# Patient Record
Sex: MALE | Race: WHITE | NOT HISPANIC OR LATINO | Employment: OTHER | URBAN - METROPOLITAN AREA
[De-identification: names, ages, dates, MRNs, and addresses within clinical notes are randomized per-mention and may not be internally consistent; named-entity substitution may affect disease eponyms.]

---

## 2018-01-01 ENCOUNTER — HOSPITAL ENCOUNTER (OUTPATIENT)
Dept: CARDIOLOGY | Facility: HOSPITAL | Age: 78
Discharge: HOME | End: 2018-10-24
Attending: INTERNAL MEDICINE

## 2018-01-01 ENCOUNTER — HOSPITAL ENCOUNTER (OUTPATIENT)
Dept: CT IMAGING | Facility: HOSPITAL | Age: 78
Discharge: HOME | End: 2018-08-07
Attending: OTOLARYNGOLOGY | Admitting: OTOLARYNGOLOGY

## 2018-01-01 ENCOUNTER — HOSPITAL ENCOUNTER (OUTPATIENT)
Dept: GENERAL RADIOLOGY | Facility: HOSPITAL | Age: 78
Discharge: HOME | End: 2018-12-19
Attending: OTOLARYNGOLOGY | Admitting: OTOLARYNGOLOGY

## 2018-01-01 ENCOUNTER — TRANSCRIBE ORDERS (OUTPATIENT)
Dept: ADMINISTRATIVE | Facility: HOSPITAL | Age: 78
End: 2018-01-01

## 2018-01-01 ENCOUNTER — HOSPITAL ENCOUNTER (OUTPATIENT)
Dept: CT IMAGING | Facility: HOSPITAL | Age: 78
Discharge: HOME | End: 2018-10-24
Attending: INTERNAL MEDICINE | Admitting: INTERNAL MEDICINE

## 2018-01-01 VITALS
SYSTOLIC BLOOD PRESSURE: 139 MMHG | DIASTOLIC BLOOD PRESSURE: 76 MMHG | HEART RATE: 76 BPM | HEIGHT: 71 IN | WEIGHT: 245 LBS | BODY MASS INDEX: 34.3 KG/M2

## 2018-01-01 DIAGNOSIS — J38.00 VOCAL CORD PARESIS: ICD-10-CM

## 2018-01-01 DIAGNOSIS — J38.00 VOCAL FOLD PARESIS: Primary | ICD-10-CM

## 2018-01-01 DIAGNOSIS — R06.09 DOE (DYSPNEA ON EXERTION): ICD-10-CM

## 2018-01-01 DIAGNOSIS — J38.00 VOCAL FOLD PARESIS: ICD-10-CM

## 2018-01-01 DIAGNOSIS — R60.0 PEDAL EDEMA: ICD-10-CM

## 2018-01-01 DIAGNOSIS — R05.9 COUGH: Primary | ICD-10-CM

## 2018-01-01 DIAGNOSIS — R91.8 INFILTRATE OF LUNG PRESENT ON CHEST X-RAY: ICD-10-CM

## 2018-01-01 DIAGNOSIS — R13.10 DYSPHAGIA, UNSPECIFIED TYPE: ICD-10-CM

## 2018-01-01 DIAGNOSIS — R91.8 INFILTRATE OF LUNG PRESENT ON CHEST X-RAY: Primary | ICD-10-CM

## 2018-01-01 DIAGNOSIS — R13.10 DYSPHAGIA, UNSPECIFIED TYPE: Primary | ICD-10-CM

## 2018-01-01 LAB
BH CV ECHO MEAS - ACS: 1.9 CM
BH CV ECHO MEAS - AO MAX PG (FULL): 3.1 MMHG
BH CV ECHO MEAS - AO MAX PG: 7.7 MMHG
BH CV ECHO MEAS - AO MEAN PG (FULL): 1 MMHG
BH CV ECHO MEAS - AO MEAN PG: 4 MMHG
BH CV ECHO MEAS - AO ROOT AREA (BSA CORRECTED): 1.6
BH CV ECHO MEAS - AO ROOT AREA: 10.2 CM^2
BH CV ECHO MEAS - AO ROOT DIAM: 3.6 CM
BH CV ECHO MEAS - AO V2 MAX: 139 CM/SEC
BH CV ECHO MEAS - AO V2 MEAN: 92.5 CM/SEC
BH CV ECHO MEAS - AO V2 VTI: 26.9 CM
BH CV ECHO MEAS - AVA(I,A): 3.4 CM^2
BH CV ECHO MEAS - AVA(I,D): 3.4 CM^2
BH CV ECHO MEAS - AVA(V,A): 3.2 CM^2
BH CV ECHO MEAS - AVA(V,D): 3.2 CM^2
BH CV ECHO MEAS - BSA(HAYCOCK): 2.4 M^2
BH CV ECHO MEAS - BSA: 2.3 M^2
BH CV ECHO MEAS - BZI_BMI: 34.2 KILOGRAMS/M^2
BH CV ECHO MEAS - BZI_METRIC_HEIGHT: 180.3 CM
BH CV ECHO MEAS - BZI_METRIC_WEIGHT: 111.1 KG
BH CV ECHO MEAS - EDV(CUBED): 97.3 ML
BH CV ECHO MEAS - EDV(MOD-SP2): 108 ML
BH CV ECHO MEAS - EDV(MOD-SP4): 129 ML
BH CV ECHO MEAS - EDV(TEICH): 97.3 ML
BH CV ECHO MEAS - EF(CUBED): 77.4 %
BH CV ECHO MEAS - EF(MOD-BP): 71 %
BH CV ECHO MEAS - EF(MOD-SP2): 69.4 %
BH CV ECHO MEAS - EF(MOD-SP4): 70.5 %
BH CV ECHO MEAS - EF(TEICH): 69.6 %
BH CV ECHO MEAS - ESV(CUBED): 22 ML
BH CV ECHO MEAS - ESV(MOD-SP2): 33 ML
BH CV ECHO MEAS - ESV(MOD-SP4): 38 ML
BH CV ECHO MEAS - ESV(TEICH): 29.6 ML
BH CV ECHO MEAS - FS: 39.1 %
BH CV ECHO MEAS - IVS/LVPW: 0.92
BH CV ECHO MEAS - IVSD: 1.1 CM
BH CV ECHO MEAS - LAT PEAK E' VEL: 9.6 CM/SEC
BH CV ECHO MEAS - LV DIASTOLIC VOL/BSA (35-75): 56.1 ML/M^2
BH CV ECHO MEAS - LV MASS(C)D: 192.9 GRAMS
BH CV ECHO MEAS - LV MASS(C)DI: 83.9 GRAMS/M^2
BH CV ECHO MEAS - LV MAX PG: 4.6 MMHG
BH CV ECHO MEAS - LV MEAN PG: 3 MMHG
BH CV ECHO MEAS - LV SYSTOLIC VOL/BSA (12-30): 16.5 ML/M^2
BH CV ECHO MEAS - LV V1 MAX: 107 CM/SEC
BH CV ECHO MEAS - LV V1 MEAN: 77.6 CM/SEC
BH CV ECHO MEAS - LV V1 VTI: 22.3 CM
BH CV ECHO MEAS - LVIDD: 4.6 CM
BH CV ECHO MEAS - LVIDS: 2.8 CM
BH CV ECHO MEAS - LVLD AP2: 7.4 CM
BH CV ECHO MEAS - LVLD AP4: 8 CM
BH CV ECHO MEAS - LVLS AP2: 6.1 CM
BH CV ECHO MEAS - LVLS AP4: 6.5 CM
BH CV ECHO MEAS - LVOT AREA (M): 4.2 CM^2
BH CV ECHO MEAS - LVOT AREA: 4.2 CM^2
BH CV ECHO MEAS - LVOT DIAM: 2.3 CM
BH CV ECHO MEAS - LVPWD: 1.2 CM
BH CV ECHO MEAS - MED PEAK E' VEL: 8.1 CM/SEC
BH CV ECHO MEAS - MV A DUR: 0.17 SEC
BH CV ECHO MEAS - MV A MAX VEL: 120 CM/SEC
BH CV ECHO MEAS - MV DEC SLOPE: 403.5 CM/SEC^2
BH CV ECHO MEAS - MV DEC TIME: 0.15 SEC
BH CV ECHO MEAS - MV E MAX VEL: 61.1 CM/SEC
BH CV ECHO MEAS - MV E/A: 0.51
BH CV ECHO MEAS - MV MAX PG: 6 MMHG
BH CV ECHO MEAS - MV MEAN PG: 3 MMHG
BH CV ECHO MEAS - MV P1/2T MAX VEL: 78.2 CM/SEC
BH CV ECHO MEAS - MV P1/2T: 56.8 MSEC
BH CV ECHO MEAS - MV V2 MAX: 122 CM/SEC
BH CV ECHO MEAS - MV V2 MEAN: 73.6 CM/SEC
BH CV ECHO MEAS - MV V2 VTI: 24.7 CM
BH CV ECHO MEAS - MVA P1/2T LCG: 2.8 CM^2
BH CV ECHO MEAS - MVA(P1/2T): 3.9 CM^2
BH CV ECHO MEAS - MVA(VTI): 3.8 CM^2
BH CV ECHO MEAS - PA ACC TIME: 0.1 SEC
BH CV ECHO MEAS - PA MAX PG (FULL): 1.1 MMHG
BH CV ECHO MEAS - PA MAX PG: 3.4 MMHG
BH CV ECHO MEAS - PA PR(ACCEL): 34.5 MMHG
BH CV ECHO MEAS - PA V2 MAX: 92.3 CM/SEC
BH CV ECHO MEAS - PULM A REVS DUR: 0.16 SEC
BH CV ECHO MEAS - PULM A REVS VEL: 33 CM/SEC
BH CV ECHO MEAS - PULM DIAS VEL: 28.3 CM/SEC
BH CV ECHO MEAS - PULM S/D: 1.1
BH CV ECHO MEAS - PULM SYS VEL: 31.3 CM/SEC
BH CV ECHO MEAS - PVA(V,A): 2.8 CM^2
BH CV ECHO MEAS - PVA(V,D): 2.8 CM^2
BH CV ECHO MEAS - QP/QS: 0.58
BH CV ECHO MEAS - RAP SYSTOLE: 3 MMHG
BH CV ECHO MEAS - RV MAX PG: 2.3 MMHG
BH CV ECHO MEAS - RV MEAN PG: 1 MMHG
BH CV ECHO MEAS - RV V1 MAX: 75.4 CM/SEC
BH CV ECHO MEAS - RV V1 MEAN: 52.2 CM/SEC
BH CV ECHO MEAS - RV V1 VTI: 15.6 CM
BH CV ECHO MEAS - RVOT AREA: 3.5 CM^2
BH CV ECHO MEAS - RVOT DIAM: 2.1 CM
BH CV ECHO MEAS - RVSP: 21 MMHG
BH CV ECHO MEAS - SI(AO): 119.1 ML/M^2
BH CV ECHO MEAS - SI(CUBED): 32.8 ML/M^2
BH CV ECHO MEAS - SI(LVOT): 40.3 ML/M^2
BH CV ECHO MEAS - SI(MOD-SP2): 32.6 ML/M^2
BH CV ECHO MEAS - SI(MOD-SP4): 39.6 ML/M^2
BH CV ECHO MEAS - SI(TEICH): 29.5 ML/M^2
BH CV ECHO MEAS - SV(AO): 273.8 ML
BH CV ECHO MEAS - SV(CUBED): 75.4 ML
BH CV ECHO MEAS - SV(LVOT): 92.7 ML
BH CV ECHO MEAS - SV(MOD-SP2): 75 ML
BH CV ECHO MEAS - SV(MOD-SP4): 91 ML
BH CV ECHO MEAS - SV(RVOT): 54 ML
BH CV ECHO MEAS - SV(TEICH): 67.8 ML
BH CV ECHO MEAS - TAPSE (>1.6): 2.8 CM2
BH CV ECHO MEAS - TR MAX VEL: 212 CM/SEC
BH CV ECHO MEASUREMENTS AVERAGE E/E' RATIO: 6.9
BH CV XLRA - RV BASE: 3.3 CM
BH CV XLRA - RV LENGTH: 7.5 CM
BH CV XLRA - RV MID: 3 CM
BH CV XLRA - TDI S': 11 CM/SEC
CREAT BLDA-MCNC: 1.1 MG/DL (ref 0.6–1.3)
LEFT ATRIUM VOLUME INDEX: 30 ML/M2
LV EF 2D ECHO EST: 71 %
MAXIMAL PREDICTED HEART RATE: 142 BPM
STRESS TARGET HR: 121 BPM

## 2018-01-01 PROCEDURE — 71250 CT THORAX DX C-: CPT

## 2018-01-01 PROCEDURE — 70470 CT HEAD/BRAIN W/O & W/DYE: CPT

## 2018-01-01 PROCEDURE — 74220 X-RAY XM ESOPHAGUS 1CNTRST: CPT

## 2018-01-01 PROCEDURE — 25010000002 IOPAMIDOL 61 % SOLUTION: Performed by: OTOLARYNGOLOGY

## 2018-01-01 PROCEDURE — 93306 TTE W/DOPPLER COMPLETE: CPT

## 2018-01-01 PROCEDURE — 93306 TTE W/DOPPLER COMPLETE: CPT | Performed by: INTERNAL MEDICINE

## 2018-01-01 PROCEDURE — 70491 CT SOFT TISSUE NECK W/DYE: CPT

## 2018-01-01 PROCEDURE — 82565 ASSAY OF CREATININE: CPT

## 2018-01-01 RX ADMIN — IOPAMIDOL 75 ML: 612 INJECTION, SOLUTION INTRAVENOUS at 09:23

## 2018-01-01 RX ADMIN — BARIUM SULFATE 183 ML: 960 POWDER, FOR SUSPENSION ORAL at 09:21

## 2019-01-01 ENCOUNTER — OFFICE VISIT (OUTPATIENT)
Dept: SURGERY | Facility: CLINIC | Age: 79
End: 2019-01-01

## 2019-01-01 ENCOUNTER — APPOINTMENT (OUTPATIENT)
Dept: CT IMAGING | Facility: HOSPITAL | Age: 79
End: 2019-01-01

## 2019-01-01 ENCOUNTER — APPOINTMENT (OUTPATIENT)
Dept: GENERAL RADIOLOGY | Facility: HOSPITAL | Age: 79
End: 2019-01-01

## 2019-01-01 ENCOUNTER — OFFICE VISIT (OUTPATIENT)
Dept: ONCOLOGY | Facility: CLINIC | Age: 79
End: 2019-01-01

## 2019-01-01 ENCOUNTER — READMISSION MANAGEMENT (OUTPATIENT)
Dept: CALL CENTER | Facility: HOSPITAL | Age: 79
End: 2019-01-01

## 2019-01-01 ENCOUNTER — APPOINTMENT (OUTPATIENT)
Dept: LAB | Facility: HOSPITAL | Age: 79
End: 2019-01-01

## 2019-01-01 ENCOUNTER — ANESTHESIA EVENT (OUTPATIENT)
Dept: PERIOP | Facility: HOSPITAL | Age: 79
End: 2019-01-01

## 2019-01-01 ENCOUNTER — HOSPITAL ENCOUNTER (INPATIENT)
Facility: HOSPITAL | Age: 79
LOS: 3 days | Discharge: HOME | End: 2019-03-29
Attending: SURGERY | Admitting: SURGERY

## 2019-01-01 ENCOUNTER — HOSPITAL ENCOUNTER (EMERGENCY)
Facility: HOSPITAL | Age: 79
Discharge: HOME | End: 2019-04-03
Attending: EMERGENCY MEDICINE | Admitting: EMERGENCY MEDICINE

## 2019-01-01 ENCOUNTER — HOSPITAL ENCOUNTER (OUTPATIENT)
Dept: CT IMAGING | Facility: HOSPITAL | Age: 79
Discharge: HOME | End: 2019-01-26
Attending: OTOLARYNGOLOGY | Admitting: OTOLARYNGOLOGY

## 2019-01-01 ENCOUNTER — APPOINTMENT (OUTPATIENT)
Dept: ONCOLOGY | Facility: CLINIC | Age: 79
End: 2019-01-01

## 2019-01-01 ENCOUNTER — ANESTHESIA EVENT (OUTPATIENT)
Dept: GASTROENTEROLOGY | Facility: HOSPITAL | Age: 79
End: 2019-01-01

## 2019-01-01 ENCOUNTER — HOSPITAL ENCOUNTER (INPATIENT)
Facility: HOSPITAL | Age: 79
LOS: 4 days | Discharge: NURSING, REHAB OR LONG-TERM CARE | End: 2019-02-16
Attending: EMERGENCY MEDICINE | Admitting: INTERNAL MEDICINE

## 2019-01-01 ENCOUNTER — HOSPITAL ENCOUNTER (INPATIENT)
Facility: HOSPITAL | Age: 79
LOS: 1 days | End: 2019-06-14
Attending: EMERGENCY MEDICINE | Admitting: INTERNAL MEDICINE

## 2019-01-01 ENCOUNTER — HOSPITAL ENCOUNTER (INPATIENT)
Facility: HOSPITAL | Age: 79
LOS: 10 days | Discharge: HOME HEALTH | End: 2019-02-09
Attending: OTOLARYNGOLOGY | Admitting: OTOLARYNGOLOGY

## 2019-01-01 ENCOUNTER — ANESTHESIA (OUTPATIENT)
Dept: PERIOP | Facility: HOSPITAL | Age: 79
End: 2019-01-01

## 2019-01-01 ENCOUNTER — APPOINTMENT (OUTPATIENT)
Dept: PREADMISSION TESTING | Facility: HOSPITAL | Age: 79
End: 2019-01-01

## 2019-01-01 ENCOUNTER — APPOINTMENT (OUTPATIENT)
Dept: CARDIOLOGY | Facility: HOSPITAL | Age: 79
End: 2019-01-01

## 2019-01-01 ENCOUNTER — HOSPITAL ENCOUNTER (OUTPATIENT)
Facility: HOSPITAL | Age: 79
Setting detail: HOSPITAL OUTPATIENT SURGERY
End: 2019-01-01
Attending: SURGERY | Admitting: SURGERY

## 2019-01-01 ENCOUNTER — TRANSCRIBE ORDERS (OUTPATIENT)
Dept: ADMINISTRATIVE | Facility: HOSPITAL | Age: 79
End: 2019-01-01

## 2019-01-01 ENCOUNTER — ANESTHESIA (OUTPATIENT)
Dept: GASTROENTEROLOGY | Facility: HOSPITAL | Age: 79
End: 2019-01-01

## 2019-01-01 ENCOUNTER — APPOINTMENT (OUTPATIENT)
Dept: GENERAL RADIOLOGY | Facility: HOSPITAL | Age: 79
End: 2019-01-01
Attending: INTERNAL MEDICINE

## 2019-01-01 VITALS
TEMPERATURE: 98.4 F | OXYGEN SATURATION: 92 % | WEIGHT: 182.9 LBS | DIASTOLIC BLOOD PRESSURE: 78 MMHG | RESPIRATION RATE: 18 BRPM | HEART RATE: 115 BPM | BODY MASS INDEX: 25.6 KG/M2 | HEIGHT: 71 IN | SYSTOLIC BLOOD PRESSURE: 139 MMHG

## 2019-01-01 VITALS
DIASTOLIC BLOOD PRESSURE: 57 MMHG | SYSTOLIC BLOOD PRESSURE: 126 MMHG | RESPIRATION RATE: 20 BRPM | TEMPERATURE: 97.8 F | HEIGHT: 71 IN | BODY MASS INDEX: 28.15 KG/M2 | HEART RATE: 78 BPM | OXYGEN SATURATION: 91 % | WEIGHT: 201.1 LBS

## 2019-01-01 VITALS
RESPIRATION RATE: 20 BRPM | BODY MASS INDEX: 26.85 KG/M2 | WEIGHT: 191.8 LBS | OXYGEN SATURATION: 90 % | SYSTOLIC BLOOD PRESSURE: 144 MMHG | HEIGHT: 71 IN | DIASTOLIC BLOOD PRESSURE: 79 MMHG | HEART RATE: 96 BPM | TEMPERATURE: 98.1 F

## 2019-01-01 VITALS
TEMPERATURE: 98.2 F | BODY MASS INDEX: 24.78 KG/M2 | DIASTOLIC BLOOD PRESSURE: 54 MMHG | HEIGHT: 71 IN | SYSTOLIC BLOOD PRESSURE: 101 MMHG | WEIGHT: 177 LBS | OXYGEN SATURATION: 93 %

## 2019-01-01 VITALS
OXYGEN SATURATION: 90 % | RESPIRATION RATE: 16 BRPM | BODY MASS INDEX: 24.81 KG/M2 | WEIGHT: 177.2 LBS | HEART RATE: 110 BPM | SYSTOLIC BLOOD PRESSURE: 137 MMHG | HEIGHT: 71 IN | TEMPERATURE: 99 F | DIASTOLIC BLOOD PRESSURE: 77 MMHG

## 2019-01-01 VITALS
OXYGEN SATURATION: 91 % | BODY MASS INDEX: 27.24 KG/M2 | WEIGHT: 194.6 LBS | SYSTOLIC BLOOD PRESSURE: 124 MMHG | DIASTOLIC BLOOD PRESSURE: 69 MMHG | RESPIRATION RATE: 18 BRPM | TEMPERATURE: 98.1 F | HEIGHT: 71 IN | HEART RATE: 88 BPM

## 2019-01-01 VITALS
DIASTOLIC BLOOD PRESSURE: 91 MMHG | RESPIRATION RATE: 18 BRPM | WEIGHT: 174.05 LBS | SYSTOLIC BLOOD PRESSURE: 156 MMHG | TEMPERATURE: 98.9 F | BODY MASS INDEX: 24.37 KG/M2 | HEIGHT: 71 IN | OXYGEN SATURATION: 98 % | HEART RATE: 92 BPM

## 2019-01-01 VITALS — HEART RATE: 85 BPM | OXYGEN SATURATION: 98 %

## 2019-01-01 VITALS — HEIGHT: 71 IN | BODY MASS INDEX: 26.75 KG/M2

## 2019-01-01 DIAGNOSIS — R06.00 DYSPNEA, UNSPECIFIED TYPE: ICD-10-CM

## 2019-01-01 DIAGNOSIS — J96.02 ACUTE RESPIRATORY FAILURE WITH HYPERCAPNIA (HCC): Primary | ICD-10-CM

## 2019-01-01 DIAGNOSIS — C32.9 LARYNGEAL CANCER (HCC): Primary | ICD-10-CM

## 2019-01-01 DIAGNOSIS — K94.29 IRRITATION AROUND PERCUTANEOUS ENDOSCOPIC GASTROSTOMY (PEG) TUBE SITE (HCC): Primary | ICD-10-CM

## 2019-01-01 DIAGNOSIS — C32.9 LARYNGEAL CANCER (HCC): ICD-10-CM

## 2019-01-01 DIAGNOSIS — R53.1 GENERALIZED WEAKNESS: ICD-10-CM

## 2019-01-01 DIAGNOSIS — Z85.9 HISTORY OF TREATMENT FOR MALIGNANCY: ICD-10-CM

## 2019-01-01 DIAGNOSIS — Z93.1 COMPLAINT ASSOCIATED WITH GASTRIC TUBE (HCC): ICD-10-CM

## 2019-01-01 DIAGNOSIS — R68.89 COMPLAINT ASSOCIATED WITH GASTRIC TUBE (HCC): ICD-10-CM

## 2019-01-01 DIAGNOSIS — R13.12 OROPHARYNGEAL DYSPHAGIA: ICD-10-CM

## 2019-01-01 DIAGNOSIS — R53.1 WEAKNESS: ICD-10-CM

## 2019-01-01 DIAGNOSIS — R13.12 OROPHARYNGEAL DYSPHAGIA: Primary | ICD-10-CM

## 2019-01-01 DIAGNOSIS — I26.99 PULMONARY EMBOLISM ON RIGHT (HCC): Primary | ICD-10-CM

## 2019-01-01 DIAGNOSIS — C32.0 VOCAL CORD CANCER (HCC): ICD-10-CM

## 2019-01-01 DIAGNOSIS — T14.8XXA BLEEDING FROM WOUND: Primary | ICD-10-CM

## 2019-01-01 DIAGNOSIS — K94.23 DRAINAGE FROM GASTROSTOMY TUBE SITE (HCC): Primary | ICD-10-CM

## 2019-01-01 DIAGNOSIS — J38.00 VOCAL CORD PARALYSIS: ICD-10-CM

## 2019-01-01 DIAGNOSIS — L89.159 DECUBITUS ULCER OF COCCYX, UNSPECIFIED PRESSURE ULCER STAGE: ICD-10-CM

## 2019-01-01 DIAGNOSIS — J18.9 PNEUMONIA OF BOTH LOWER LOBES DUE TO INFECTIOUS ORGANISM: Primary | ICD-10-CM

## 2019-01-01 DIAGNOSIS — Z51.89 VISIT FOR WOUND CHECK: Primary | ICD-10-CM

## 2019-01-01 DIAGNOSIS — R09.02 HYPOXIA: ICD-10-CM

## 2019-01-01 DIAGNOSIS — C32.0 VOCAL CORD CANCER (HCC): Primary | ICD-10-CM

## 2019-01-01 DIAGNOSIS — J96.02 ACUTE RESPIRATORY FAILURE WITH HYPERCAPNIA (HCC): ICD-10-CM

## 2019-01-01 LAB
ALBUMIN SERPL-MCNC: 2.4 G/DL (ref 3.5–5.2)
ALBUMIN SERPL-MCNC: 2.9 G/DL (ref 3.5–5.2)
ALBUMIN SERPL-MCNC: 2.9 G/DL (ref 3.5–5.2)
ALBUMIN SERPL-MCNC: 3 G/DL (ref 3.5–5.2)
ALBUMIN SERPL-MCNC: 3.3 G/DL (ref 3.5–5.2)
ALBUMIN/GLOB SERPL: 0.6 G/DL
ALBUMIN/GLOB SERPL: 0.9 G/DL
ALBUMIN/GLOB SERPL: 1 G/DL
ALBUMIN/GLOB SERPL: 1 G/DL
ALBUMIN/GLOB SERPL: 1.1 G/DL
ALP SERPL-CCNC: 33 U/L (ref 39–117)
ALP SERPL-CCNC: 37 U/L (ref 39–117)
ALP SERPL-CCNC: 38 U/L (ref 39–117)
ALP SERPL-CCNC: 40 U/L (ref 39–117)
ALP SERPL-CCNC: 61 U/L (ref 39–117)
ALT SERPL W P-5'-P-CCNC: 10 U/L (ref 1–41)
ALT SERPL W P-5'-P-CCNC: 25 U/L (ref 1–41)
ALT SERPL W P-5'-P-CCNC: 31 U/L (ref 1–41)
ALT SERPL W P-5'-P-CCNC: 34 U/L (ref 1–41)
ALT SERPL W P-5'-P-CCNC: 40 U/L (ref 1–41)
ANION GAP SERPL CALCULATED.3IONS-SCNC: 11 MMOL/L
ANION GAP SERPL CALCULATED.3IONS-SCNC: 11.3 MMOL/L
ANION GAP SERPL CALCULATED.3IONS-SCNC: 11.4 MMOL/L
ANION GAP SERPL CALCULATED.3IONS-SCNC: 12 MMOL/L
ANION GAP SERPL CALCULATED.3IONS-SCNC: 12.2 MMOL/L
ANION GAP SERPL CALCULATED.3IONS-SCNC: 12.3 MMOL/L
ANION GAP SERPL CALCULATED.3IONS-SCNC: 12.5 MMOL/L
ANION GAP SERPL CALCULATED.3IONS-SCNC: 12.7 MMOL/L
ANION GAP SERPL CALCULATED.3IONS-SCNC: 12.8 MMOL/L
ANION GAP SERPL CALCULATED.3IONS-SCNC: 13.7 MMOL/L
ANION GAP SERPL CALCULATED.3IONS-SCNC: 18.9 MMOL/L
ANION GAP SERPL CALCULATED.3IONS-SCNC: 5 MMOL/L
ANION GAP SERPL CALCULATED.3IONS-SCNC: 5.2 MMOL/L
ANION GAP SERPL CALCULATED.3IONS-SCNC: 5.8 MMOL/L
ANION GAP SERPL CALCULATED.3IONS-SCNC: 6.2 MMOL/L
ANION GAP SERPL CALCULATED.3IONS-SCNC: 8.7 MMOL/L
ANION GAP SERPL CALCULATED.3IONS-SCNC: 8.9 MMOL/L
ANION GAP SERPL CALCULATED.3IONS-SCNC: 9.2 MMOL/L
ANION GAP SERPL CALCULATED.3IONS-SCNC: 9.6 MMOL/L
ARTERIAL PATENCY WRIST A: POSITIVE
AST SERPL-CCNC: 12 U/L (ref 1–40)
AST SERPL-CCNC: 13 U/L (ref 1–40)
AST SERPL-CCNC: 18 U/L (ref 1–40)
AST SERPL-CCNC: 27 U/L (ref 1–40)
AST SERPL-CCNC: 28 U/L (ref 1–40)
ATMOSPHERIC PRESS: 753.7 MMHG
ATMOSPHERIC PRESS: 754.1 MMHG
ATMOSPHERIC PRESS: 758.3 MMHG
BASE EXCESS BLDA CALC-SCNC: -1.6 MMOL/L (ref 0–2)
BASE EXCESS BLDA CALC-SCNC: 3.1 MMOL/L (ref 0–2)
BASE EXCESS BLDA CALC-SCNC: 9.6 MMOL/L (ref 0–2)
BASOPHILS # BLD AUTO: 0 10*3/MM3 (ref 0–0.2)
BASOPHILS # BLD AUTO: 0 10*3/MM3 (ref 0–0.2)
BASOPHILS # BLD AUTO: 0.01 10*3/MM3 (ref 0–0.2)
BASOPHILS # BLD AUTO: 0.01 10*3/MM3 (ref 0–0.2)
BASOPHILS # BLD AUTO: 0.02 10*3/MM3 (ref 0–0.2)
BASOPHILS # BLD AUTO: 0.04 10*3/MM3 (ref 0–0.2)
BASOPHILS # BLD AUTO: 0.04 10*3/MM3 (ref 0–0.2)
BASOPHILS # BLD AUTO: 0.05 10*3/MM3 (ref 0–0.2)
BASOPHILS NFR BLD AUTO: 0 % (ref 0–1.5)
BASOPHILS NFR BLD AUTO: 0 % (ref 0–1.5)
BASOPHILS NFR BLD AUTO: 0.1 % (ref 0–1.5)
BASOPHILS NFR BLD AUTO: 0.1 % (ref 0–1.5)
BASOPHILS NFR BLD AUTO: 0.2 % (ref 0–1.5)
BASOPHILS NFR BLD AUTO: 0.3 % (ref 0–1.5)
BASOPHILS NFR BLD AUTO: 0.5 % (ref 0–1.5)
BDY SITE: ABNORMAL
BH CV LOW VAS RIGHT GASTRONEMIUS VESSEL: 1
BH CV LOW VAS RIGHT GREATER SAPH BK VESSEL: 1
BH CV LOWER VASCULAR LEFT COMMON FEMORAL AUGMENT: NORMAL
BH CV LOWER VASCULAR LEFT COMMON FEMORAL COMPETENT: NORMAL
BH CV LOWER VASCULAR LEFT COMMON FEMORAL COMPRESS: NORMAL
BH CV LOWER VASCULAR LEFT COMMON FEMORAL PHASIC: NORMAL
BH CV LOWER VASCULAR LEFT COMMON FEMORAL SPONT: NORMAL
BH CV LOWER VASCULAR LEFT DISTAL FEMORAL COMPRESS: NORMAL
BH CV LOWER VASCULAR LEFT GASTRONEMIUS COMPRESS: NORMAL
BH CV LOWER VASCULAR LEFT GREATER SAPH AK COMPRESS: NORMAL
BH CV LOWER VASCULAR LEFT GREATER SAPH BK COMPRESS: NORMAL
BH CV LOWER VASCULAR LEFT MID FEMORAL AUGMENT: NORMAL
BH CV LOWER VASCULAR LEFT MID FEMORAL COMPETENT: NORMAL
BH CV LOWER VASCULAR LEFT MID FEMORAL COMPRESS: NORMAL
BH CV LOWER VASCULAR LEFT MID FEMORAL PHASIC: NORMAL
BH CV LOWER VASCULAR LEFT MID FEMORAL SPONT: NORMAL
BH CV LOWER VASCULAR LEFT PERONEAL COMPRESS: NORMAL
BH CV LOWER VASCULAR LEFT POPLITEAL AUGMENT: NORMAL
BH CV LOWER VASCULAR LEFT POPLITEAL COMPETENT: NORMAL
BH CV LOWER VASCULAR LEFT POPLITEAL COMPRESS: NORMAL
BH CV LOWER VASCULAR LEFT POPLITEAL PHASIC: NORMAL
BH CV LOWER VASCULAR LEFT POPLITEAL SPONT: NORMAL
BH CV LOWER VASCULAR LEFT POSTERIOR TIBIAL COMPRESS: NORMAL
BH CV LOWER VASCULAR LEFT PROXIMAL FEMORAL COMPRESS: NORMAL
BH CV LOWER VASCULAR LEFT SAPHENOFEMORAL JUNCTION COMPRESS: NORMAL
BH CV LOWER VASCULAR LEFT SAPHENOFEMORAL JUNCTION PHASIC: NORMAL
BH CV LOWER VASCULAR LEFT SAPHENOFEMORAL JUNCTION SPONT: NORMAL
BH CV LOWER VASCULAR RIGHT COMMON FEMORAL AUGMENT: NORMAL
BH CV LOWER VASCULAR RIGHT COMMON FEMORAL COMPETENT: NORMAL
BH CV LOWER VASCULAR RIGHT COMMON FEMORAL COMPRESS: NORMAL
BH CV LOWER VASCULAR RIGHT COMMON FEMORAL PHASIC: NORMAL
BH CV LOWER VASCULAR RIGHT COMMON FEMORAL SPONT: NORMAL
BH CV LOWER VASCULAR RIGHT DISTAL FEMORAL COMPRESS: NORMAL
BH CV LOWER VASCULAR RIGHT GASTRONEMIUS COMPRESS: NORMAL
BH CV LOWER VASCULAR RIGHT GASTRONEMIUS THROMBUS: NORMAL
BH CV LOWER VASCULAR RIGHT GREATER SAPH AK COMPRESS: NORMAL
BH CV LOWER VASCULAR RIGHT GREATER SAPH BK COMPRESS: NORMAL
BH CV LOWER VASCULAR RIGHT GREATER SAPH BK THROMBUS: NORMAL
BH CV LOWER VASCULAR RIGHT MID FEMORAL AUGMENT: NORMAL
BH CV LOWER VASCULAR RIGHT MID FEMORAL COMPETENT: NORMAL
BH CV LOWER VASCULAR RIGHT MID FEMORAL COMPRESS: NORMAL
BH CV LOWER VASCULAR RIGHT MID FEMORAL PHASIC: NORMAL
BH CV LOWER VASCULAR RIGHT MID FEMORAL SPONT: NORMAL
BH CV LOWER VASCULAR RIGHT PERONEAL COMPRESS: NORMAL
BH CV LOWER VASCULAR RIGHT POPLITEAL AUGMENT: NORMAL
BH CV LOWER VASCULAR RIGHT POPLITEAL COMPETENT: NORMAL
BH CV LOWER VASCULAR RIGHT POPLITEAL COMPRESS: NORMAL
BH CV LOWER VASCULAR RIGHT POPLITEAL PHASIC: NORMAL
BH CV LOWER VASCULAR RIGHT POPLITEAL SPONT: NORMAL
BH CV LOWER VASCULAR RIGHT POSTERIOR TIBIAL COMPRESS: NORMAL
BH CV LOWER VASCULAR RIGHT PROXIMAL FEMORAL COMPRESS: NORMAL
BH CV LOWER VASCULAR RIGHT SAPHENOFEMORAL JUNCTION COMPRESS: NORMAL
BH CV LOWER VASCULAR RIGHT SAPHENOFEMORAL JUNCTION PHASIC: NORMAL
BH CV LOWER VASCULAR RIGHT SAPHENOFEMORAL JUNCTION SPONT: NORMAL
BILIRUB SERPL-MCNC: 0.3 MG/DL (ref 0.2–1.2)
BILIRUB SERPL-MCNC: 0.4 MG/DL (ref 0.1–1.2)
BILIRUB SERPL-MCNC: 0.6 MG/DL (ref 0.1–1.2)
BILIRUB SERPL-MCNC: 0.8 MG/DL (ref 0.1–1.2)
BILIRUB SERPL-MCNC: 1.4 MG/DL (ref 0.1–1.2)
BILIRUB UR QL STRIP: NEGATIVE
BUN BLD-MCNC: 17 MG/DL (ref 8–23)
BUN BLD-MCNC: 18 MG/DL (ref 8–23)
BUN BLD-MCNC: 21 MG/DL (ref 8–23)
BUN BLD-MCNC: 23 MG/DL (ref 8–23)
BUN BLD-MCNC: 25 MG/DL (ref 8–23)
BUN BLD-MCNC: 26 MG/DL (ref 8–23)
BUN BLD-MCNC: 28 MG/DL (ref 8–23)
BUN BLD-MCNC: 30 MG/DL (ref 8–23)
BUN BLD-MCNC: 31 MG/DL (ref 8–23)
BUN BLD-MCNC: 33 MG/DL (ref 8–23)
BUN BLD-MCNC: 36 MG/DL (ref 8–23)
BUN BLD-MCNC: 41 MG/DL (ref 8–23)
BUN BLD-MCNC: 44 MG/DL (ref 8–23)
BUN BLD-MCNC: 45 MG/DL (ref 8–23)
BUN BLD-MCNC: 45 MG/DL (ref 8–23)
BUN BLD-MCNC: 46 MG/DL (ref 8–23)
BUN BLD-MCNC: 52 MG/DL (ref 8–23)
BUN/CREAT SERPL: 22.8 (ref 7–25)
BUN/CREAT SERPL: 24.8 (ref 7–25)
BUN/CREAT SERPL: 26.1 (ref 7–25)
BUN/CREAT SERPL: 26.2 (ref 7–25)
BUN/CREAT SERPL: 26.5 (ref 7–25)
BUN/CREAT SERPL: 27.2 (ref 7–25)
BUN/CREAT SERPL: 27.7 (ref 7–25)
BUN/CREAT SERPL: 30.4 (ref 7–25)
BUN/CREAT SERPL: 31.3 (ref 7–25)
BUN/CREAT SERPL: 33.3 (ref 7–25)
BUN/CREAT SERPL: 33.7 (ref 7–25)
BUN/CREAT SERPL: 35 (ref 7–25)
BUN/CREAT SERPL: 36.6 (ref 7–25)
BUN/CREAT SERPL: 38.5 (ref 7–25)
BUN/CREAT SERPL: 39 (ref 7–25)
BUN/CREAT SERPL: 39.6 (ref 7–25)
BUN/CREAT SERPL: 40.8 (ref 7–25)
BUN/CREAT SERPL: 46 (ref 7–25)
BUN/CREAT SERPL: 46.9 (ref 7–25)
CALCIUM SPEC-SCNC: 8.2 MG/DL (ref 8.6–10.5)
CALCIUM SPEC-SCNC: 8.5 MG/DL (ref 8.6–10.5)
CALCIUM SPEC-SCNC: 8.7 MG/DL (ref 8.6–10.5)
CALCIUM SPEC-SCNC: 8.8 MG/DL (ref 8.6–10.5)
CALCIUM SPEC-SCNC: 8.9 MG/DL (ref 8.6–10.5)
CALCIUM SPEC-SCNC: 8.9 MG/DL (ref 8.6–10.5)
CALCIUM SPEC-SCNC: 9 MG/DL (ref 8.6–10.5)
CALCIUM SPEC-SCNC: 9.1 MG/DL (ref 8.6–10.5)
CALCIUM SPEC-SCNC: 9.1 MG/DL (ref 8.6–10.5)
CALCIUM SPEC-SCNC: 9.2 MG/DL (ref 8.6–10.5)
CALCIUM SPEC-SCNC: 9.3 MG/DL (ref 8.6–10.5)
CALCIUM SPEC-SCNC: 9.9 MG/DL (ref 8.6–10.5)
CHLORIDE SERPL-SCNC: 100 MMOL/L (ref 98–107)
CHLORIDE SERPL-SCNC: 101 MMOL/L (ref 98–107)
CHLORIDE SERPL-SCNC: 102 MMOL/L (ref 98–107)
CHLORIDE SERPL-SCNC: 103 MMOL/L (ref 98–107)
CHLORIDE SERPL-SCNC: 105 MMOL/L (ref 98–107)
CHLORIDE SERPL-SCNC: 82 MMOL/L (ref 98–107)
CHLORIDE SERPL-SCNC: 95 MMOL/L (ref 98–107)
CHLORIDE SERPL-SCNC: 95 MMOL/L (ref 98–107)
CHLORIDE SERPL-SCNC: 96 MMOL/L (ref 98–107)
CHLORIDE SERPL-SCNC: 97 MMOL/L (ref 98–107)
CHLORIDE SERPL-SCNC: 97 MMOL/L (ref 98–107)
CHLORIDE SERPL-SCNC: 98 MMOL/L (ref 98–107)
CLARITY UR: CLEAR
CO2 SERPL-SCNC: 26.2 MMOL/L (ref 22–29)
CO2 SERPL-SCNC: 28.3 MMOL/L (ref 22–29)
CO2 SERPL-SCNC: 28.3 MMOL/L (ref 22–29)
CO2 SERPL-SCNC: 28.7 MMOL/L (ref 22–29)
CO2 SERPL-SCNC: 28.8 MMOL/L (ref 22–29)
CO2 SERPL-SCNC: 29.3 MMOL/L (ref 22–29)
CO2 SERPL-SCNC: 29.6 MMOL/L (ref 22–29)
CO2 SERPL-SCNC: 29.7 MMOL/L (ref 22–29)
CO2 SERPL-SCNC: 30.5 MMOL/L (ref 22–29)
CO2 SERPL-SCNC: 32 MMOL/L (ref 22–29)
CO2 SERPL-SCNC: 32.1 MMOL/L (ref 22–29)
CO2 SERPL-SCNC: 32.8 MMOL/L (ref 22–29)
CO2 SERPL-SCNC: 32.8 MMOL/L (ref 22–29)
CO2 SERPL-SCNC: 33 MMOL/L (ref 22–29)
CO2 SERPL-SCNC: 33.4 MMOL/L (ref 22–29)
CO2 SERPL-SCNC: 36 MMOL/L (ref 22–29)
CO2 SERPL-SCNC: 36.1 MMOL/L (ref 22–29)
CO2 SERPL-SCNC: 37.8 MMOL/L (ref 22–29)
CO2 SERPL-SCNC: 38.2 MMOL/L (ref 22–29)
COLOR UR: YELLOW
CREAT BLD-MCNC: 0.64 MG/DL (ref 0.76–1.27)
CREAT BLD-MCNC: 0.65 MG/DL (ref 0.76–1.27)
CREAT BLD-MCNC: 0.65 MG/DL (ref 0.76–1.27)
CREAT BLD-MCNC: 0.67 MG/DL (ref 0.76–1.27)
CREAT BLD-MCNC: 0.68 MG/DL (ref 0.76–1.27)
CREAT BLD-MCNC: 0.75 MG/DL (ref 0.76–1.27)
CREAT BLD-MCNC: 0.76 MG/DL (ref 0.76–1.27)
CREAT BLD-MCNC: 0.79 MG/DL (ref 0.76–1.27)
CREAT BLD-MCNC: 0.88 MG/DL (ref 0.76–1.27)
CREAT BLD-MCNC: 0.98 MG/DL (ref 0.76–1.27)
CREAT BLD-MCNC: 1.03 MG/DL (ref 0.76–1.27)
CREAT BLD-MCNC: 1.03 MG/DL (ref 0.76–1.27)
CREAT BLD-MCNC: 1.05 MG/DL (ref 0.76–1.27)
CREAT BLD-MCNC: 1.11 MG/DL (ref 0.76–1.27)
CREAT BLD-MCNC: 1.13 MG/DL (ref 0.76–1.27)
CREAT BLD-MCNC: 1.17 MG/DL (ref 0.76–1.27)
CREAT BLD-MCNC: 1.18 MG/DL (ref 0.76–1.27)
CREAT BLD-MCNC: 1.23 MG/DL (ref 0.76–1.27)
CREAT BLD-MCNC: 1.35 MG/DL (ref 0.76–1.27)
CYTO UR: NORMAL
DEPRECATED RDW RBC AUTO: 42.7 FL (ref 37–54)
DEPRECATED RDW RBC AUTO: 47.8 FL (ref 37–54)
DEPRECATED RDW RBC AUTO: 49.2 FL (ref 37–54)
DEPRECATED RDW RBC AUTO: 49.4 FL (ref 37–54)
DEPRECATED RDW RBC AUTO: 49.6 FL (ref 37–54)
DEPRECATED RDW RBC AUTO: 50.4 FL (ref 37–54)
DEPRECATED RDW RBC AUTO: 50.7 FL (ref 37–54)
DEPRECATED RDW RBC AUTO: 50.7 FL (ref 37–54)
DEPRECATED RDW RBC AUTO: 51.1 FL (ref 37–54)
DEPRECATED RDW RBC AUTO: 51.2 FL (ref 37–54)
DEPRECATED RDW RBC AUTO: 51.8 FL (ref 37–54)
DEPRECATED RDW RBC AUTO: 51.8 FL (ref 37–54)
DEPRECATED RDW RBC AUTO: 52.4 FL (ref 37–54)
DEPRECATED RDW RBC AUTO: 53.1 FL (ref 37–54)
DEPRECATED RDW RBC AUTO: 53.2 FL (ref 37–54)
DEPRECATED RDW RBC AUTO: 53.4 FL (ref 37–54)
DEPRECATED RDW RBC AUTO: 53.5 FL (ref 37–54)
EOSINOPHIL # BLD AUTO: 0 10*3/MM3 (ref 0–0.4)
EOSINOPHIL # BLD AUTO: 0 10*3/MM3 (ref 0–0.7)
EOSINOPHIL # BLD AUTO: 0.01 10*3/MM3 (ref 0–0.7)
EOSINOPHIL # BLD AUTO: 0.01 10*3/MM3 (ref 0–0.7)
EOSINOPHIL # BLD AUTO: 0.02 10*3/MM3 (ref 0–0.4)
EOSINOPHIL # BLD AUTO: 0.03 10*3/MM3 (ref 0–0.4)
EOSINOPHIL # BLD AUTO: 0.06 10*3/MM3 (ref 0–0.4)
EOSINOPHIL # BLD AUTO: 0.07 10*3/MM3 (ref 0–0.4)
EOSINOPHIL # BLD AUTO: 0.08 10*3/MM3 (ref 0–0.4)
EOSINOPHIL # BLD AUTO: 0.11 10*3/MM3 (ref 0–0.4)
EOSINOPHIL NFR BLD AUTO: 0 % (ref 0.3–6.2)
EOSINOPHIL NFR BLD AUTO: 0 % (ref 0.3–6.2)
EOSINOPHIL NFR BLD AUTO: 0.1 % (ref 0.3–6.2)
EOSINOPHIL NFR BLD AUTO: 0.1 % (ref 0.3–6.2)
EOSINOPHIL NFR BLD AUTO: 0.2 % (ref 0.3–6.2)
EOSINOPHIL NFR BLD AUTO: 0.3 % (ref 0.3–6.2)
EOSINOPHIL NFR BLD AUTO: 0.7 % (ref 0.3–6.2)
EOSINOPHIL NFR BLD AUTO: 0.7 % (ref 0.3–6.2)
EOSINOPHIL NFR BLD AUTO: 0.9 % (ref 0.3–6.2)
EOSINOPHIL NFR BLD AUTO: 1.2 % (ref 0.3–6.2)
ERYTHROCYTE [DISTWIDTH] IN BLOOD BY AUTOMATED COUNT: 13.1 % (ref 12.3–15.4)
ERYTHROCYTE [DISTWIDTH] IN BLOOD BY AUTOMATED COUNT: 13.7 % (ref 12.3–15.4)
ERYTHROCYTE [DISTWIDTH] IN BLOOD BY AUTOMATED COUNT: 14 % (ref 12.3–15.4)
ERYTHROCYTE [DISTWIDTH] IN BLOOD BY AUTOMATED COUNT: 14.2 % (ref 12.3–15.4)
ERYTHROCYTE [DISTWIDTH] IN BLOOD BY AUTOMATED COUNT: 14.4 % (ref 11.5–14.5)
ERYTHROCYTE [DISTWIDTH] IN BLOOD BY AUTOMATED COUNT: 14.6 % (ref 12.3–15.4)
ERYTHROCYTE [DISTWIDTH] IN BLOOD BY AUTOMATED COUNT: 14.8 % (ref 11.5–14.5)
ERYTHROCYTE [DISTWIDTH] IN BLOOD BY AUTOMATED COUNT: 14.8 % (ref 11.5–14.5)
ERYTHROCYTE [DISTWIDTH] IN BLOOD BY AUTOMATED COUNT: 14.9 % (ref 11.5–14.5)
ERYTHROCYTE [DISTWIDTH] IN BLOOD BY AUTOMATED COUNT: 15 % (ref 11.5–14.5)
ERYTHROCYTE [DISTWIDTH] IN BLOOD BY AUTOMATED COUNT: 15.1 % (ref 11.5–14.5)
ERYTHROCYTE [DISTWIDTH] IN BLOOD BY AUTOMATED COUNT: 15.2 % (ref 11.5–14.5)
GAS FLOW AIRWAY: 15 LPM
GAS FLOW AIRWAY: 4 LPM
GFR SERPL CREATININE-BSD FRML MDRD: 100 ML/MIN/1.73
GFR SERPL CREATININE-BSD FRML MDRD: 113 ML/MIN/1.73
GFR SERPL CREATININE-BSD FRML MDRD: 114 ML/MIN/1.73
GFR SERPL CREATININE-BSD FRML MDRD: 118 ML/MIN/1.73
GFR SERPL CREATININE-BSD FRML MDRD: 118 ML/MIN/1.73
GFR SERPL CREATININE-BSD FRML MDRD: 121 ML/MIN/1.73
GFR SERPL CREATININE-BSD FRML MDRD: 51 ML/MIN/1.73
GFR SERPL CREATININE-BSD FRML MDRD: 57 ML/MIN/1.73
GFR SERPL CREATININE-BSD FRML MDRD: 60 ML/MIN/1.73
GFR SERPL CREATININE-BSD FRML MDRD: 60 ML/MIN/1.73
GFR SERPL CREATININE-BSD FRML MDRD: 63 ML/MIN/1.73
GFR SERPL CREATININE-BSD FRML MDRD: 64 ML/MIN/1.73
GFR SERPL CREATININE-BSD FRML MDRD: 68 ML/MIN/1.73
GFR SERPL CREATININE-BSD FRML MDRD: 70 ML/MIN/1.73
GFR SERPL CREATININE-BSD FRML MDRD: 70 ML/MIN/1.73
GFR SERPL CREATININE-BSD FRML MDRD: 74 ML/MIN/1.73
GFR SERPL CREATININE-BSD FRML MDRD: 84 ML/MIN/1.73
GFR SERPL CREATININE-BSD FRML MDRD: 95 ML/MIN/1.73
GFR SERPL CREATININE-BSD FRML MDRD: 99 ML/MIN/1.73
GLOBULIN UR ELPH-MCNC: 2.7 GM/DL
GLOBULIN UR ELPH-MCNC: 2.9 GM/DL
GLOBULIN UR ELPH-MCNC: 3.3 GM/DL
GLOBULIN UR ELPH-MCNC: 3.4 GM/DL
GLOBULIN UR ELPH-MCNC: 4.2 GM/DL
GLUCOSE BLD-MCNC: 100 MG/DL (ref 65–99)
GLUCOSE BLD-MCNC: 102 MG/DL (ref 65–99)
GLUCOSE BLD-MCNC: 117 MG/DL (ref 65–99)
GLUCOSE BLD-MCNC: 133 MG/DL (ref 65–99)
GLUCOSE BLD-MCNC: 151 MG/DL (ref 65–99)
GLUCOSE BLD-MCNC: 155 MG/DL (ref 65–99)
GLUCOSE BLD-MCNC: 156 MG/DL (ref 65–99)
GLUCOSE BLD-MCNC: 161 MG/DL (ref 65–99)
GLUCOSE BLD-MCNC: 168 MG/DL (ref 65–99)
GLUCOSE BLD-MCNC: 168 MG/DL (ref 65–99)
GLUCOSE BLD-MCNC: 170 MG/DL (ref 65–99)
GLUCOSE BLD-MCNC: 181 MG/DL (ref 65–99)
GLUCOSE BLD-MCNC: 199 MG/DL (ref 65–99)
GLUCOSE BLD-MCNC: 204 MG/DL (ref 65–99)
GLUCOSE BLD-MCNC: 213 MG/DL (ref 65–99)
GLUCOSE BLD-MCNC: 214 MG/DL (ref 65–99)
GLUCOSE BLD-MCNC: 228 MG/DL (ref 65–99)
GLUCOSE BLD-MCNC: 273 MG/DL (ref 65–99)
GLUCOSE BLD-MCNC: 525 MG/DL (ref 65–99)
GLUCOSE BLDC GLUCOMTR-MCNC: 104 MG/DL (ref 70–130)
GLUCOSE BLDC GLUCOMTR-MCNC: 104 MG/DL (ref 70–130)
GLUCOSE BLDC GLUCOMTR-MCNC: 105 MG/DL (ref 70–130)
GLUCOSE BLDC GLUCOMTR-MCNC: 106 MG/DL (ref 70–130)
GLUCOSE BLDC GLUCOMTR-MCNC: 108 MG/DL (ref 70–130)
GLUCOSE BLDC GLUCOMTR-MCNC: 110 MG/DL (ref 70–130)
GLUCOSE BLDC GLUCOMTR-MCNC: 113 MG/DL (ref 70–130)
GLUCOSE BLDC GLUCOMTR-MCNC: 113 MG/DL (ref 70–130)
GLUCOSE BLDC GLUCOMTR-MCNC: 114 MG/DL (ref 70–130)
GLUCOSE BLDC GLUCOMTR-MCNC: 117 MG/DL (ref 70–130)
GLUCOSE BLDC GLUCOMTR-MCNC: 118 MG/DL (ref 70–130)
GLUCOSE BLDC GLUCOMTR-MCNC: 122 MG/DL (ref 70–130)
GLUCOSE BLDC GLUCOMTR-MCNC: 125 MG/DL (ref 70–130)
GLUCOSE BLDC GLUCOMTR-MCNC: 128 MG/DL (ref 70–130)
GLUCOSE BLDC GLUCOMTR-MCNC: 131 MG/DL (ref 70–130)
GLUCOSE BLDC GLUCOMTR-MCNC: 132 MG/DL (ref 70–130)
GLUCOSE BLDC GLUCOMTR-MCNC: 133 MG/DL (ref 70–130)
GLUCOSE BLDC GLUCOMTR-MCNC: 135 MG/DL (ref 70–130)
GLUCOSE BLDC GLUCOMTR-MCNC: 136 MG/DL (ref 70–130)
GLUCOSE BLDC GLUCOMTR-MCNC: 138 MG/DL (ref 70–130)
GLUCOSE BLDC GLUCOMTR-MCNC: 138 MG/DL (ref 70–130)
GLUCOSE BLDC GLUCOMTR-MCNC: 139 MG/DL (ref 70–130)
GLUCOSE BLDC GLUCOMTR-MCNC: 143 MG/DL (ref 70–130)
GLUCOSE BLDC GLUCOMTR-MCNC: 146 MG/DL (ref 70–130)
GLUCOSE BLDC GLUCOMTR-MCNC: 149 MG/DL (ref 70–130)
GLUCOSE BLDC GLUCOMTR-MCNC: 150 MG/DL (ref 70–130)
GLUCOSE BLDC GLUCOMTR-MCNC: 150 MG/DL (ref 70–130)
GLUCOSE BLDC GLUCOMTR-MCNC: 151 MG/DL (ref 70–130)
GLUCOSE BLDC GLUCOMTR-MCNC: 154 MG/DL (ref 70–130)
GLUCOSE BLDC GLUCOMTR-MCNC: 154 MG/DL (ref 70–130)
GLUCOSE BLDC GLUCOMTR-MCNC: 156 MG/DL (ref 70–130)
GLUCOSE BLDC GLUCOMTR-MCNC: 158 MG/DL (ref 70–130)
GLUCOSE BLDC GLUCOMTR-MCNC: 159 MG/DL (ref 70–130)
GLUCOSE BLDC GLUCOMTR-MCNC: 161 MG/DL (ref 70–130)
GLUCOSE BLDC GLUCOMTR-MCNC: 162 MG/DL (ref 70–130)
GLUCOSE BLDC GLUCOMTR-MCNC: 164 MG/DL (ref 70–130)
GLUCOSE BLDC GLUCOMTR-MCNC: 165 MG/DL (ref 70–130)
GLUCOSE BLDC GLUCOMTR-MCNC: 165 MG/DL (ref 70–130)
GLUCOSE BLDC GLUCOMTR-MCNC: 166 MG/DL (ref 70–130)
GLUCOSE BLDC GLUCOMTR-MCNC: 167 MG/DL (ref 70–130)
GLUCOSE BLDC GLUCOMTR-MCNC: 172 MG/DL (ref 70–130)
GLUCOSE BLDC GLUCOMTR-MCNC: 173 MG/DL (ref 70–130)
GLUCOSE BLDC GLUCOMTR-MCNC: 177 MG/DL (ref 70–130)
GLUCOSE BLDC GLUCOMTR-MCNC: 182 MG/DL (ref 70–130)
GLUCOSE BLDC GLUCOMTR-MCNC: 184 MG/DL (ref 70–130)
GLUCOSE BLDC GLUCOMTR-MCNC: 187 MG/DL (ref 70–130)
GLUCOSE BLDC GLUCOMTR-MCNC: 189 MG/DL (ref 70–130)
GLUCOSE BLDC GLUCOMTR-MCNC: 191 MG/DL (ref 70–130)
GLUCOSE BLDC GLUCOMTR-MCNC: 193 MG/DL (ref 70–130)
GLUCOSE BLDC GLUCOMTR-MCNC: 194 MG/DL (ref 70–130)
GLUCOSE BLDC GLUCOMTR-MCNC: 195 MG/DL (ref 70–130)
GLUCOSE BLDC GLUCOMTR-MCNC: 196 MG/DL (ref 70–130)
GLUCOSE BLDC GLUCOMTR-MCNC: 198 MG/DL (ref 70–130)
GLUCOSE BLDC GLUCOMTR-MCNC: 198 MG/DL (ref 70–130)
GLUCOSE BLDC GLUCOMTR-MCNC: 200 MG/DL (ref 70–130)
GLUCOSE BLDC GLUCOMTR-MCNC: 205 MG/DL (ref 70–130)
GLUCOSE BLDC GLUCOMTR-MCNC: 210 MG/DL (ref 70–130)
GLUCOSE BLDC GLUCOMTR-MCNC: 212 MG/DL (ref 70–130)
GLUCOSE BLDC GLUCOMTR-MCNC: 213 MG/DL (ref 70–130)
GLUCOSE BLDC GLUCOMTR-MCNC: 214 MG/DL (ref 70–130)
GLUCOSE BLDC GLUCOMTR-MCNC: 223 MG/DL (ref 70–130)
GLUCOSE BLDC GLUCOMTR-MCNC: 228 MG/DL (ref 70–130)
GLUCOSE BLDC GLUCOMTR-MCNC: 244 MG/DL (ref 70–130)
GLUCOSE BLDC GLUCOMTR-MCNC: 246 MG/DL (ref 70–130)
GLUCOSE BLDC GLUCOMTR-MCNC: 251 MG/DL (ref 70–130)
GLUCOSE BLDC GLUCOMTR-MCNC: 260 MG/DL (ref 70–130)
GLUCOSE BLDC GLUCOMTR-MCNC: 273 MG/DL (ref 70–130)
GLUCOSE BLDC GLUCOMTR-MCNC: 274 MG/DL (ref 70–130)
GLUCOSE BLDC GLUCOMTR-MCNC: 283 MG/DL (ref 70–130)
GLUCOSE BLDC GLUCOMTR-MCNC: 286 MG/DL (ref 70–130)
GLUCOSE BLDC GLUCOMTR-MCNC: 286 MG/DL (ref 70–130)
GLUCOSE BLDC GLUCOMTR-MCNC: 291 MG/DL (ref 70–130)
GLUCOSE BLDC GLUCOMTR-MCNC: 299 MG/DL (ref 70–130)
GLUCOSE BLDC GLUCOMTR-MCNC: 300 MG/DL (ref 70–130)
GLUCOSE BLDC GLUCOMTR-MCNC: 311 MG/DL (ref 70–130)
GLUCOSE BLDC GLUCOMTR-MCNC: 396 MG/DL (ref 70–130)
GLUCOSE BLDC GLUCOMTR-MCNC: 502 MG/DL (ref 70–130)
GLUCOSE BLDC GLUCOMTR-MCNC: 65 MG/DL (ref 70–130)
GLUCOSE BLDC GLUCOMTR-MCNC: 73 MG/DL (ref 70–130)
GLUCOSE BLDC GLUCOMTR-MCNC: 82 MG/DL (ref 70–130)
GLUCOSE BLDC GLUCOMTR-MCNC: 88 MG/DL (ref 70–130)
GLUCOSE BLDC GLUCOMTR-MCNC: 94 MG/DL (ref 70–130)
GLUCOSE BLDC GLUCOMTR-MCNC: 95 MG/DL (ref 70–130)
GLUCOSE BLDC GLUCOMTR-MCNC: 96 MG/DL (ref 70–130)
GLUCOSE UR STRIP-MCNC: NEGATIVE MG/DL
HBA1C MFR BLD: 6.69 % (ref 4.8–5.6)
HCO3 BLDA-SCNC: 33.8 MMOL/L (ref 22–28)
HCO3 BLDA-SCNC: 36.4 MMOL/L (ref 22–28)
HCO3 BLDA-SCNC: 41.9 MMOL/L (ref 22–28)
HCT VFR BLD AUTO: 35.3 % (ref 37.5–51)
HCT VFR BLD AUTO: 36.3 % (ref 37.5–51)
HCT VFR BLD AUTO: 37.1 % (ref 37.5–51)
HCT VFR BLD AUTO: 37.5 % (ref 37.5–51)
HCT VFR BLD AUTO: 38.5 % (ref 37.5–51)
HCT VFR BLD AUTO: 38.7 % (ref 37.5–51)
HCT VFR BLD AUTO: 38.7 % (ref 40.4–52.2)
HCT VFR BLD AUTO: 39 % (ref 40.4–52.2)
HCT VFR BLD AUTO: 39.2 % (ref 40.4–52.2)
HCT VFR BLD AUTO: 39.4 % (ref 40.4–52.2)
HCT VFR BLD AUTO: 39.7 % (ref 40.4–52.2)
HCT VFR BLD AUTO: 40.2 % (ref 40.4–52.2)
HCT VFR BLD AUTO: 40.3 % (ref 37.5–51)
HCT VFR BLD AUTO: 40.5 % (ref 40.4–52.2)
HCT VFR BLD AUTO: 41.1 % (ref 40.4–52.2)
HCT VFR BLD AUTO: 41.5 % (ref 40.4–52.2)
HCT VFR BLD AUTO: 41.9 % (ref 40.4–52.2)
HGB BLD-MCNC: 10.6 G/DL (ref 13–17.7)
HGB BLD-MCNC: 10.6 G/DL (ref 13–17.7)
HGB BLD-MCNC: 10.8 G/DL (ref 13–17.7)
HGB BLD-MCNC: 11 G/DL (ref 13–17.7)
HGB BLD-MCNC: 11.3 G/DL (ref 13.7–17.6)
HGB BLD-MCNC: 11.3 G/DL (ref 13–17.7)
HGB BLD-MCNC: 11.5 G/DL (ref 13–17.7)
HGB BLD-MCNC: 11.7 G/DL (ref 13.7–17.6)
HGB BLD-MCNC: 11.8 G/DL (ref 13.7–17.6)
HGB BLD-MCNC: 11.8 G/DL (ref 13.7–17.6)
HGB BLD-MCNC: 12 G/DL (ref 13.7–17.6)
HGB BLD-MCNC: 12.1 G/DL (ref 13.7–17.6)
HGB BLD-MCNC: 12.2 G/DL (ref 13.7–17.6)
HGB BLD-MCNC: 12.2 G/DL (ref 13–17.7)
HGB BLD-MCNC: 12.3 G/DL (ref 13.7–17.6)
HGB BLD-MCNC: 12.5 G/DL (ref 13.7–17.6)
HGB BLD-MCNC: 12.7 G/DL (ref 13.7–17.6)
HGB UR QL STRIP.AUTO: NEGATIVE
HOLD SPECIMEN: NORMAL
HOLD SPECIMEN: NORMAL
HOROWITZ INDEX BLD+IHG-RTO: 50 %
IMM GRANULOCYTES # BLD AUTO: 0.02 10*3/MM3 (ref 0–0.03)
IMM GRANULOCYTES # BLD AUTO: 0.02 10*3/MM3 (ref 0–0.03)
IMM GRANULOCYTES # BLD AUTO: 0.03 10*3/MM3 (ref 0–0.03)
IMM GRANULOCYTES # BLD AUTO: 0.04 10*3/MM3 (ref 0–0.05)
IMM GRANULOCYTES # BLD AUTO: 0.04 10*3/MM3 (ref 0–0.05)
IMM GRANULOCYTES # BLD AUTO: 0.05 10*3/MM3 (ref 0–0.05)
IMM GRANULOCYTES # BLD AUTO: 0.44 10*3/MM3 (ref 0–0.05)
IMM GRANULOCYTES NFR BLD AUTO: 0.3 % (ref 0–0.5)
IMM GRANULOCYTES NFR BLD AUTO: 0.3 % (ref 0–0.5)
IMM GRANULOCYTES NFR BLD AUTO: 0.4 % (ref 0–0.5)
IMM GRANULOCYTES NFR BLD AUTO: 0.5 % (ref 0–0.5)
IMM GRANULOCYTES NFR BLD AUTO: 0.6 % (ref 0–0.5)
IMM GRANULOCYTES NFR BLD AUTO: 0.6 % (ref 0–0.5)
IMM GRANULOCYTES NFR BLD AUTO: 1.7 % (ref 0–0.5)
KETONES UR QL STRIP: NEGATIVE
LAB AP CASE REPORT: NORMAL
LAB AP DIAGNOSIS COMMENT: NORMAL
LEUKOCYTE ESTERASE UR QL STRIP.AUTO: NEGATIVE
LYMPHOCYTES # BLD AUTO: 0.44 10*3/MM3 (ref 0.9–4.8)
LYMPHOCYTES # BLD AUTO: 0.56 10*3/MM3 (ref 0.9–4.8)
LYMPHOCYTES # BLD AUTO: 0.62 10*3/MM3 (ref 0.7–3.1)
LYMPHOCYTES # BLD AUTO: 0.62 10*3/MM3 (ref 0.7–3.1)
LYMPHOCYTES # BLD AUTO: 0.67 10*3/MM3 (ref 0.7–3.1)
LYMPHOCYTES # BLD AUTO: 0.69 10*3/MM3 (ref 0.7–3.1)
LYMPHOCYTES # BLD AUTO: 0.7 10*3/MM3 (ref 0.9–4.8)
LYMPHOCYTES # BLD AUTO: 1.19 10*3/MM3 (ref 0.7–3.1)
LYMPHOCYTES # BLD AUTO: 1.35 10*3/MM3 (ref 0.7–3.1)
LYMPHOCYTES # BLD AUTO: 1.38 10*3/MM3 (ref 0.7–3.1)
LYMPHOCYTES NFR BLD AUTO: 11.4 % (ref 19.6–45.3)
LYMPHOCYTES NFR BLD AUTO: 13 % (ref 19.6–45.3)
LYMPHOCYTES NFR BLD AUTO: 17.3 % (ref 19.6–45.3)
LYMPHOCYTES NFR BLD AUTO: 2.3 % (ref 19.6–45.3)
LYMPHOCYTES NFR BLD AUTO: 5.2 % (ref 19.6–45.3)
LYMPHOCYTES NFR BLD AUTO: 6.5 % (ref 19.6–45.3)
LYMPHOCYTES NFR BLD AUTO: 7.1 % (ref 19.6–45.3)
LYMPHOCYTES NFR BLD AUTO: 7.5 % (ref 19.6–45.3)
LYMPHOCYTES NFR BLD AUTO: 7.6 % (ref 19.6–45.3)
LYMPHOCYTES NFR BLD AUTO: 9.5 % (ref 19.6–45.3)
Lab: NORMAL
MAGNESIUM SERPL-MCNC: 1.8 MG/DL (ref 1.6–2.4)
MAGNESIUM SERPL-MCNC: 1.8 MG/DL (ref 1.6–2.4)
MAGNESIUM SERPL-MCNC: 2 MG/DL (ref 1.6–2.4)
MCH RBC QN AUTO: 27.9 PG (ref 26.6–33)
MCH RBC QN AUTO: 28 PG (ref 26.6–33)
MCH RBC QN AUTO: 28 PG (ref 26.6–33)
MCH RBC QN AUTO: 28.2 PG (ref 26.6–33)
MCH RBC QN AUTO: 28.2 PG (ref 27–32.7)
MCH RBC QN AUTO: 28.2 PG (ref 27–32.7)
MCH RBC QN AUTO: 28.3 PG (ref 26.6–33)
MCH RBC QN AUTO: 28.3 PG (ref 27–32.7)
MCH RBC QN AUTO: 28.4 PG (ref 27–32.7)
MCH RBC QN AUTO: 28.5 PG (ref 27–32.7)
MCH RBC QN AUTO: 28.6 PG (ref 27–32.7)
MCH RBC QN AUTO: 28.7 PG (ref 27–32.7)
MCH RBC QN AUTO: 28.8 PG (ref 27–32.7)
MCH RBC QN AUTO: 28.9 PG (ref 26.6–33)
MCH RBC QN AUTO: 29 PG (ref 26.6–33)
MCHC RBC AUTO-ENTMCNC: 27.5 G/DL (ref 31.5–35.7)
MCHC RBC AUTO-ENTMCNC: 28.4 G/DL (ref 31.5–35.7)
MCHC RBC AUTO-ENTMCNC: 28.6 G/DL (ref 32.6–36.4)
MCHC RBC AUTO-ENTMCNC: 28.8 G/DL (ref 32.6–36.4)
MCHC RBC AUTO-ENTMCNC: 29.1 G/DL (ref 31.5–35.7)
MCHC RBC AUTO-ENTMCNC: 29.2 G/DL (ref 31.5–35.7)
MCHC RBC AUTO-ENTMCNC: 29.4 G/DL (ref 32.6–36.4)
MCHC RBC AUTO-ENTMCNC: 30.1 G/DL (ref 32.6–36.4)
MCHC RBC AUTO-ENTMCNC: 30.2 G/DL (ref 32.6–36.4)
MCHC RBC AUTO-ENTMCNC: 30.3 G/DL (ref 31.5–35.7)
MCHC RBC AUTO-ENTMCNC: 30.3 G/DL (ref 32.6–36.4)
MCHC RBC AUTO-ENTMCNC: 30.4 G/DL (ref 32.6–36.4)
MCHC RBC AUTO-ENTMCNC: 30.5 G/DL (ref 32.6–36.4)
MCHC RBC AUTO-ENTMCNC: 30.7 G/DL (ref 31.5–35.7)
MCHC RBC AUTO-ENTMCNC: 30.9 G/DL (ref 32.6–36.4)
MCHC RBC AUTO-ENTMCNC: 31 G/DL (ref 32.6–36.4)
MCHC RBC AUTO-ENTMCNC: 32 G/DL (ref 31.5–35.7)
MCV RBC AUTO: 101.3 FL (ref 79–97)
MCV RBC AUTO: 90.3 FL (ref 79–97)
MCV RBC AUTO: 91.5 FL (ref 79.8–96.2)
MCV RBC AUTO: 92.5 FL (ref 79.8–96.2)
MCV RBC AUTO: 92.9 FL (ref 79.8–96.2)
MCV RBC AUTO: 93.2 FL (ref 79.8–96.2)
MCV RBC AUTO: 93.5 FL (ref 79–97)
MCV RBC AUTO: 94.5 FL (ref 79–97)
MCV RBC AUTO: 94.6 FL (ref 79.8–96.2)
MCV RBC AUTO: 94.7 FL (ref 79.8–96.2)
MCV RBC AUTO: 94.9 FL (ref 79.8–96.2)
MCV RBC AUTO: 96.1 FL (ref 79–97)
MCV RBC AUTO: 96.5 FL (ref 79–97)
MCV RBC AUTO: 97.6 FL (ref 79.8–96.2)
MCV RBC AUTO: 98.5 FL (ref 79–97)
MCV RBC AUTO: 98.6 FL (ref 79.8–96.2)
MCV RBC AUTO: 99.7 FL (ref 79.8–96.2)
MODALITY: ABNORMAL
MONOCYTES # BLD AUTO: 0.02 10*3/MM3 (ref 0.2–1.2)
MONOCYTES # BLD AUTO: 0.57 10*3/MM3 (ref 0.2–1.2)
MONOCYTES # BLD AUTO: 0.62 10*3/MM3 (ref 0.1–0.9)
MONOCYTES # BLD AUTO: 0.66 10*3/MM3 (ref 0.2–1.2)
MONOCYTES # BLD AUTO: 0.71 10*3/MM3 (ref 0.1–0.9)
MONOCYTES # BLD AUTO: 0.71 10*3/MM3 (ref 0.1–0.9)
MONOCYTES # BLD AUTO: 0.77 10*3/MM3 (ref 0.1–0.9)
MONOCYTES # BLD AUTO: 0.79 10*3/MM3 (ref 0.1–0.9)
MONOCYTES # BLD AUTO: 0.79 10*3/MM3 (ref 0.1–0.9)
MONOCYTES # BLD AUTO: 1.22 10*3/MM3 (ref 0.1–0.9)
MONOCYTES NFR BLD AUTO: 0.2 % (ref 5–12)
MONOCYTES NFR BLD AUTO: 4.6 % (ref 5–12)
MONOCYTES NFR BLD AUTO: 6.5 % (ref 5–12)
MONOCYTES NFR BLD AUTO: 7.7 % (ref 5–12)
MONOCYTES NFR BLD AUTO: 7.8 % (ref 5–12)
MONOCYTES NFR BLD AUTO: 8.1 % (ref 5–12)
MONOCYTES NFR BLD AUTO: 8.6 % (ref 5–12)
MONOCYTES NFR BLD AUTO: 8.9 % (ref 5–12)
NEUTROPHILS # BLD AUTO: 24.29 10*3/MM3 (ref 1.7–7)
NEUTROPHILS # BLD AUTO: 5.82 10*3/MM3 (ref 1.4–7)
NEUTROPHILS # BLD AUTO: 6.1 10*3/MM3 (ref 1.9–8.1)
NEUTROPHILS # BLD AUTO: 6.15 10*3/MM3 (ref 1.9–8.1)
NEUTROPHILS # BLD AUTO: 7.02 10*3/MM3 (ref 1.4–7)
NEUTROPHILS # BLD AUTO: 7.28 10*3/MM3 (ref 1.4–7)
NEUTROPHILS # BLD AUTO: 7.74 10*3/MM3 (ref 1.4–7)
NEUTROPHILS # BLD AUTO: 7.99 10*3/MM3 (ref 1.9–8.1)
NEUTROPHILS # BLD AUTO: 8.77 10*3/MM3 (ref 1.4–7)
NEUTROPHILS # BLD AUTO: 9.58 10*3/MM3 (ref 1.7–7)
NEUTROPHILS NFR BLD AUTO: 72.9 % (ref 42.7–76)
NEUTROPHILS NFR BLD AUTO: 76.5 % (ref 42.7–76)
NEUTROPHILS NFR BLD AUTO: 80.7 % (ref 42.7–76)
NEUTROPHILS NFR BLD AUTO: 82.4 % (ref 42.7–76)
NEUTROPHILS NFR BLD AUTO: 83.1 % (ref 42.7–76)
NEUTROPHILS NFR BLD AUTO: 83.4 % (ref 42.7–76)
NEUTROPHILS NFR BLD AUTO: 84 % (ref 42.7–76)
NEUTROPHILS NFR BLD AUTO: 85 % (ref 42.7–76)
NEUTROPHILS NFR BLD AUTO: 91.2 % (ref 42.7–76)
NEUTROPHILS NFR BLD AUTO: 94 % (ref 42.7–76)
NITRITE UR QL STRIP: NEGATIVE
NRBC BLD AUTO-RTO: 0 /100 WBC (ref 0–0)
NRBC BLD AUTO-RTO: 0 /100 WBC (ref 0–0.2)
NRBC BLD AUTO-RTO: 0.1 /100 WBC (ref 0–0.2)
NT-PROBNP SERPL-MCNC: 1665 PG/ML (ref 0–1800)
O2 A-A PPRESDIFF RESPIRATORY: 0.3 MMHG
PATH REPORT.FINAL DX SPEC: NORMAL
PATH REPORT.GROSS SPEC: NORMAL
PCO2 BLDA: 106.6 MM HG (ref 35–45)
PCO2 BLDA: 86.1 MM HG (ref 35–45)
PCO2 BLDA: >154.7 MM HG (ref 35–45)
PEEP RESPIRATORY: 5 CM[H2O]
PH BLDA: 6.94 PH UNITS (ref 7.35–7.45)
PH BLDA: 7.2 PH UNITS (ref 7.35–7.45)
PH BLDA: 7.2 PH UNITS (ref 7.35–7.45)
PH UR STRIP.AUTO: <=5 [PH] (ref 5–8)
PLATELET # BLD AUTO: 178 10*3/MM3 (ref 140–500)
PLATELET # BLD AUTO: 182 10*3/MM3 (ref 140–500)
PLATELET # BLD AUTO: 191 10*3/MM3 (ref 140–500)
PLATELET # BLD AUTO: 193 10*3/MM3 (ref 140–500)
PLATELET # BLD AUTO: 194 10*3/MM3 (ref 140–500)
PLATELET # BLD AUTO: 196 10*3/MM3 (ref 140–450)
PLATELET # BLD AUTO: 198 10*3/MM3 (ref 140–450)
PLATELET # BLD AUTO: 207 10*3/MM3 (ref 140–450)
PLATELET # BLD AUTO: 221 10*3/MM3 (ref 140–500)
PLATELET # BLD AUTO: 223 10*3/MM3 (ref 140–500)
PLATELET # BLD AUTO: 225 10*3/MM3 (ref 140–500)
PLATELET # BLD AUTO: 227 10*3/MM3 (ref 140–500)
PLATELET # BLD AUTO: 230 10*3/MM3 (ref 140–450)
PLATELET # BLD AUTO: 238 10*3/MM3 (ref 140–450)
PLATELET # BLD AUTO: 239 10*3/MM3 (ref 140–450)
PLATELET # BLD AUTO: 246 10*3/MM3 (ref 140–500)
PLATELET # BLD AUTO: 345 10*3/MM3 (ref 140–450)
PMV BLD AUTO: 10.4 FL (ref 6–12)
PMV BLD AUTO: 10.4 FL (ref 6–12)
PMV BLD AUTO: 10.5 FL (ref 6–12)
PMV BLD AUTO: 10.5 FL (ref 6–12)
PMV BLD AUTO: 10.7 FL (ref 6–12)
PMV BLD AUTO: 10.7 FL (ref 6–12)
PMV BLD AUTO: 11 FL (ref 6–12)
PMV BLD AUTO: 11.1 FL (ref 6–12)
PMV BLD AUTO: 11.1 FL (ref 6–12)
PMV BLD AUTO: 11.2 FL (ref 6–12)
PMV BLD AUTO: 11.3 FL (ref 6–12)
PMV BLD AUTO: 11.5 FL (ref 6–12)
PMV BLD AUTO: 11.6 FL (ref 6–12)
PMV BLD AUTO: 11.8 FL (ref 6–12)
PMV BLD AUTO: 11.9 FL (ref 6–12)
PMV BLD AUTO: 12.1 FL (ref 6–12)
PMV BLD AUTO: 12.4 FL (ref 6–12)
PO2 BLDA: 123.8 MM HG (ref 80–100)
PO2 BLDA: 140.5 MM HG (ref 80–100)
PO2 BLDA: 87 MM HG (ref 80–100)
POTASSIUM BLD-SCNC: 3 MMOL/L (ref 3.5–5.2)
POTASSIUM BLD-SCNC: 3.3 MMOL/L (ref 3.5–5.2)
POTASSIUM BLD-SCNC: 3.7 MMOL/L (ref 3.5–5.2)
POTASSIUM BLD-SCNC: 3.8 MMOL/L (ref 3.5–5.2)
POTASSIUM BLD-SCNC: 3.9 MMOL/L (ref 3.5–5.2)
POTASSIUM BLD-SCNC: 4 MMOL/L (ref 3.5–5.2)
POTASSIUM BLD-SCNC: 4.1 MMOL/L (ref 3.5–5.2)
POTASSIUM BLD-SCNC: 4.1 MMOL/L (ref 3.5–5.2)
POTASSIUM BLD-SCNC: 4.5 MMOL/L (ref 3.5–5.2)
POTASSIUM BLD-SCNC: 4.6 MMOL/L (ref 3.5–5.2)
POTASSIUM BLD-SCNC: 4.7 MMOL/L (ref 3.5–5.2)
POTASSIUM BLD-SCNC: 4.7 MMOL/L (ref 3.5–5.2)
POTASSIUM BLD-SCNC: 4.8 MMOL/L (ref 3.5–5.2)
POTASSIUM BLD-SCNC: 4.9 MMOL/L (ref 3.5–5.2)
POTASSIUM BLD-SCNC: 5.2 MMOL/L (ref 3.5–5.2)
POTASSIUM BLD-SCNC: 5.7 MMOL/L (ref 3.5–5.2)
POTASSIUM BLD-SCNC: 6.1 MMOL/L (ref 3.5–5.2)
PROT SERPL-MCNC: 5.6 G/DL (ref 6–8.5)
PROT SERPL-MCNC: 5.9 G/DL (ref 6–8.5)
PROT SERPL-MCNC: 6.3 G/DL (ref 6–8.5)
PROT SERPL-MCNC: 6.6 G/DL (ref 6–8.5)
PROT SERPL-MCNC: 6.6 G/DL (ref 6–8.5)
PROT UR QL STRIP: NEGATIVE
RBC # BLD AUTO: 3.76 10*6/MM3 (ref 4.14–5.8)
RBC # BLD AUTO: 3.8 10*6/MM3 (ref 4.14–5.8)
RBC # BLD AUTO: 3.86 10*6/MM3 (ref 4.14–5.8)
RBC # BLD AUTO: 3.91 10*6/MM3 (ref 4.14–5.8)
RBC # BLD AUTO: 3.93 10*6/MM3 (ref 4.14–5.8)
RBC # BLD AUTO: 3.93 10*6/MM3 (ref 4.6–6)
RBC # BLD AUTO: 3.97 10*6/MM3 (ref 4.14–5.8)
RBC # BLD AUTO: 4.09 10*6/MM3 (ref 4.6–6)
RBC # BLD AUTO: 4.11 10*6/MM3 (ref 4.6–6)
RBC # BLD AUTO: 4.12 10*6/MM3 (ref 4.6–6)
RBC # BLD AUTO: 4.25 10*6/MM3 (ref 4.6–6)
RBC # BLD AUTO: 4.26 10*6/MM3 (ref 4.6–6)
RBC # BLD AUTO: 4.26 10*6/MM3 (ref 4.6–6)
RBC # BLD AUTO: 4.31 10*6/MM3 (ref 4.14–5.8)
RBC # BLD AUTO: 4.36 10*6/MM3 (ref 4.6–6)
RBC # BLD AUTO: 4.38 10*6/MM3 (ref 4.6–6)
RBC # BLD AUTO: 4.49 10*6/MM3 (ref 4.6–6)
SAO2 % BLDCOA: 93.3 % (ref 92–99)
SAO2 % BLDCOA: 96 % (ref 92–99)
SAO2 % BLDCOA: 97.4 % (ref 92–99)
SET MECH RESP RATE: 16
SODIUM BLD-SCNC: 137 MMOL/L (ref 136–145)
SODIUM BLD-SCNC: 138 MMOL/L (ref 136–145)
SODIUM BLD-SCNC: 139 MMOL/L (ref 136–145)
SODIUM BLD-SCNC: 140 MMOL/L (ref 136–145)
SODIUM BLD-SCNC: 141 MMOL/L (ref 136–145)
SODIUM BLD-SCNC: 142 MMOL/L (ref 136–145)
SODIUM BLD-SCNC: 143 MMOL/L (ref 136–145)
SODIUM BLD-SCNC: 144 MMOL/L (ref 136–145)
SODIUM BLD-SCNC: 146 MMOL/L (ref 136–145)
SP GR UR STRIP: 1.02 (ref 1–1.03)
T4 FREE SERPL-MCNC: 1.4 NG/DL (ref 0.93–1.7)
TOTAL RATE: 24 BREATHS/MINUTE
TOTAL RATE: 24 BREATHS/MINUTE
TOTAL RATE: 29 BREATHS/MINUTE
TROPONIN T SERPL-MCNC: 0.02 NG/ML (ref 0–0.03)
TROPONIN T SERPL-MCNC: <0.01 NG/ML (ref 0–0.03)
TSH SERPL DL<=0.05 MIU/L-ACNC: 1.15 MIU/ML (ref 0.27–4.2)
UROBILINOGEN UR QL STRIP: NORMAL
VENTILATOR MODE: ABNORMAL
VT ON VENT VENT: 471 ML
WBC NRBC COR # BLD: 10.31 10*3/MM3 (ref 3.4–10.8)
WBC NRBC COR # BLD: 11.77 10*3/MM3 (ref 4.5–10.7)
WBC NRBC COR # BLD: 11.87 10*3/MM3 (ref 3.4–10.8)
WBC NRBC COR # BLD: 26.62 10*3/MM3 (ref 3.4–10.8)
WBC NRBC COR # BLD: 7.4 10*3/MM3 (ref 4.5–10.7)
WBC NRBC COR # BLD: 7.4 10*3/MM3 (ref 4.5–10.7)
WBC NRBC COR # BLD: 7.71 10*3/MM3 (ref 4.5–10.7)
WBC NRBC COR # BLD: 7.86 10*3/MM3 (ref 4.5–10.7)
WBC NRBC COR # BLD: 7.98 10*3/MM3 (ref 3.4–10.8)
WBC NRBC COR # BLD: 8.01 10*3/MM3 (ref 4.5–10.7)
WBC NRBC COR # BLD: 8.06 10*3/MM3 (ref 4.5–10.7)
WBC NRBC COR # BLD: 8.5 10*3/MM3 (ref 4.5–10.7)
WBC NRBC COR # BLD: 8.73 10*3/MM3 (ref 3.4–10.8)
WBC NRBC COR # BLD: 8.97 10*3/MM3 (ref 4.5–10.7)
WBC NRBC COR # BLD: 9.16 10*3/MM3 (ref 4.5–10.7)
WBC NRBC COR # BLD: 9.18 10*3/MM3 (ref 3.4–10.8)
WBC NRBC COR # BLD: 9.22 10*3/MM3 (ref 3.4–10.8)
WHOLE BLOOD HOLD SPECIMEN: NORMAL
WHOLE BLOOD HOLD SPECIMEN: NORMAL

## 2019-01-01 PROCEDURE — 93005 ELECTROCARDIOGRAM TRACING: CPT | Performed by: INTERNAL MEDICINE

## 2019-01-01 PROCEDURE — 63710000001 INSULIN LISPRO (HUMAN) PER 5 UNITS: Performed by: INTERNAL MEDICINE

## 2019-01-01 PROCEDURE — 43246 EGD PLACE GASTROSTOMY TUBE: CPT | Performed by: SURGERY

## 2019-01-01 PROCEDURE — 80053 COMPREHEN METABOLIC PANEL: CPT | Performed by: EMERGENCY MEDICINE

## 2019-01-01 PROCEDURE — 25010000002 ENOXAPARIN PER 10 MG: Performed by: INTERNAL MEDICINE

## 2019-01-01 PROCEDURE — 25010000002 FUROSEMIDE PER 20 MG: Performed by: INTERNAL MEDICINE

## 2019-01-01 PROCEDURE — 25010000002 FENTANYL CITRATE (PF) 100 MCG/2ML SOLUTION: Performed by: NURSE ANESTHETIST, CERTIFIED REGISTERED

## 2019-01-01 PROCEDURE — 82962 GLUCOSE BLOOD TEST: CPT

## 2019-01-01 PROCEDURE — 85027 COMPLETE CBC AUTOMATED: CPT | Performed by: INTERNAL MEDICINE

## 2019-01-01 PROCEDURE — 80048 BASIC METABOLIC PNL TOTAL CA: CPT | Performed by: INTERNAL MEDICINE

## 2019-01-01 PROCEDURE — 25010000002 PIPERACILLIN SOD-TAZOBACTAM PER 1 G: Performed by: INTERNAL MEDICINE

## 2019-01-01 PROCEDURE — 25010000002 DEXAMETHASONE SODIUM PHOSPHATE 20 MG/5ML SOLUTION

## 2019-01-01 PROCEDURE — 63710000001 METHYLPREDNISOLONE 4 MG TABLET THERAPY PACK 21 EACH DISP PACK: Performed by: HOSPITALIST

## 2019-01-01 PROCEDURE — 99223 1ST HOSP IP/OBS HIGH 75: CPT | Performed by: INTERNAL MEDICINE

## 2019-01-01 PROCEDURE — 97162 PT EVAL MOD COMPLEX 30 MIN: CPT

## 2019-01-01 PROCEDURE — 83735 ASSAY OF MAGNESIUM: CPT | Performed by: HOSPITALIST

## 2019-01-01 PROCEDURE — 85025 COMPLETE CBC W/AUTO DIFF WBC: CPT | Performed by: HOSPITALIST

## 2019-01-01 PROCEDURE — 99222 1ST HOSP IP/OBS MODERATE 55: CPT | Performed by: INTERNAL MEDICINE

## 2019-01-01 PROCEDURE — 99232 SBSQ HOSP IP/OBS MODERATE 35: CPT | Performed by: INTERNAL MEDICINE

## 2019-01-01 PROCEDURE — 84484 ASSAY OF TROPONIN QUANT: CPT | Performed by: EMERGENCY MEDICINE

## 2019-01-01 PROCEDURE — 85025 COMPLETE CBC W/AUTO DIFF WBC: CPT | Performed by: INTERNAL MEDICINE

## 2019-01-01 PROCEDURE — 99285 EMERGENCY DEPT VISIT HI MDM: CPT

## 2019-01-01 PROCEDURE — 25010000002 PROPOFOL 10 MG/ML EMULSION: Performed by: NURSE ANESTHETIST, CERTIFIED REGISTERED

## 2019-01-01 PROCEDURE — 25010000003 POTASSIUM CHLORIDE 10 MEQ/100ML SOLUTION: Performed by: INTERNAL MEDICINE

## 2019-01-01 PROCEDURE — 31500 INSERT EMERGENCY AIRWAY: CPT | Performed by: INTERNAL MEDICINE

## 2019-01-01 PROCEDURE — 63710000001 INSULIN REGULAR HUMAN PER 5 UNITS: Performed by: EMERGENCY MEDICINE

## 2019-01-01 PROCEDURE — 99231 SBSQ HOSP IP/OBS SF/LOW 25: CPT | Performed by: SURGERY

## 2019-01-01 PROCEDURE — 25010000003 EPINEPHRINE 30 MG/30ML SOLUTION 30 ML VIAL: Performed by: OTOLARYNGOLOGY

## 2019-01-01 PROCEDURE — 83880 ASSAY OF NATRIURETIC PEPTIDE: CPT | Performed by: EMERGENCY MEDICINE

## 2019-01-01 PROCEDURE — 0 IOPAMIDOL PER 1 ML: Performed by: EMERGENCY MEDICINE

## 2019-01-01 PROCEDURE — 25010000002 MIDAZOLAM PER 1 MG: Performed by: ANESTHESIOLOGY

## 2019-01-01 PROCEDURE — 25010000002 NALOXONE PER 1 MG: Performed by: NURSE ANESTHETIST, CERTIFIED REGISTERED

## 2019-01-01 PROCEDURE — 83735 ASSAY OF MAGNESIUM: CPT | Performed by: INTERNAL MEDICINE

## 2019-01-01 PROCEDURE — 94799 UNLISTED PULMONARY SVC/PX: CPT

## 2019-01-01 PROCEDURE — 99215 OFFICE O/P EST HI 40 MIN: CPT | Performed by: INTERNAL MEDICINE

## 2019-01-01 PROCEDURE — 80048 BASIC METABOLIC PNL TOTAL CA: CPT | Performed by: SURGERY

## 2019-01-01 PROCEDURE — 63710000001 INSULIN LISPRO (HUMAN) PER 5 UNITS: Performed by: HOSPITALIST

## 2019-01-01 PROCEDURE — 82803 BLOOD GASES ANY COMBINATION: CPT

## 2019-01-01 PROCEDURE — 81003 URINALYSIS AUTO W/O SCOPE: CPT | Performed by: EMERGENCY MEDICINE

## 2019-01-01 PROCEDURE — 25010000002 ENOXAPARIN PER 10 MG: Performed by: EMERGENCY MEDICINE

## 2019-01-01 PROCEDURE — 97110 THERAPEUTIC EXERCISES: CPT

## 2019-01-01 PROCEDURE — 83735 ASSAY OF MAGNESIUM: CPT | Performed by: EMERGENCY MEDICINE

## 2019-01-01 PROCEDURE — 63710000001 INSULIN REGULAR HUMAN PER 5 UNITS: Performed by: SURGERY

## 2019-01-01 PROCEDURE — 70491 CT SOFT TISSUE NECK W/DYE: CPT

## 2019-01-01 PROCEDURE — 80053 COMPREHEN METABOLIC PANEL: CPT | Performed by: HOSPITALIST

## 2019-01-01 PROCEDURE — 92611 MOTION FLUOROSCOPY/SWALLOW: CPT

## 2019-01-01 PROCEDURE — 93005 ELECTROCARDIOGRAM TRACING: CPT | Performed by: EMERGENCY MEDICINE

## 2019-01-01 PROCEDURE — 25010000002 FENTANYL CITRATE (PF) 100 MCG/2ML SOLUTION: Performed by: INTERNAL MEDICINE

## 2019-01-01 PROCEDURE — 5A1945Z RESPIRATORY VENTILATION, 24-96 CONSECUTIVE HOURS: ICD-10-PCS | Performed by: INTERNAL MEDICINE

## 2019-01-01 PROCEDURE — 99221 1ST HOSP IP/OBS SF/LOW 40: CPT | Performed by: SURGERY

## 2019-01-01 PROCEDURE — 63710000001 INSULIN GLARGINE PER 5 UNITS: Performed by: INTERNAL MEDICINE

## 2019-01-01 PROCEDURE — 94640 AIRWAY INHALATION TREATMENT: CPT

## 2019-01-01 PROCEDURE — 74018 RADEX ABDOMEN 1 VIEW: CPT

## 2019-01-01 PROCEDURE — 99212 OFFICE O/P EST SF 10 MIN: CPT | Performed by: SURGERY

## 2019-01-01 PROCEDURE — 71275 CT ANGIOGRAPHY CHEST: CPT

## 2019-01-01 PROCEDURE — 80053 COMPREHEN METABOLIC PANEL: CPT | Performed by: INTERNAL MEDICINE

## 2019-01-01 PROCEDURE — 25010000002 ENOXAPARIN PER 10 MG: Performed by: SURGERY

## 2019-01-01 PROCEDURE — 0CBS8ZX EXCISION OF LARYNX, VIA NATURAL OR ARTIFICIAL OPENING ENDOSCOPIC, DIAGNOSTIC: ICD-10-PCS | Performed by: OTOLARYNGOLOGY

## 2019-01-01 PROCEDURE — 84443 ASSAY THYROID STIM HORMONE: CPT | Performed by: INTERNAL MEDICINE

## 2019-01-01 PROCEDURE — 93010 ELECTROCARDIOGRAM REPORT: CPT | Performed by: INTERNAL MEDICINE

## 2019-01-01 PROCEDURE — 97165 OT EVAL LOW COMPLEX 30 MIN: CPT

## 2019-01-01 PROCEDURE — 25010000002 MIDAZOLAM PER 1 MG: Performed by: INTERNAL MEDICINE

## 2019-01-01 PROCEDURE — 25010000002 METHYLPREDNISOLONE PER 40 MG: Performed by: INTERNAL MEDICINE

## 2019-01-01 PROCEDURE — 80048 BASIC METABOLIC PNL TOTAL CA: CPT | Performed by: HOSPITALIST

## 2019-01-01 PROCEDURE — 94003 VENT MGMT INPAT SUBQ DAY: CPT

## 2019-01-01 PROCEDURE — 88331 PATH CONSLTJ SURG 1 BLK 1SPC: CPT | Performed by: OTOLARYNGOLOGY

## 2019-01-01 PROCEDURE — 25010000002 PROPOFOL 10 MG/ML EMULSION: Performed by: ANESTHESIOLOGY

## 2019-01-01 PROCEDURE — 99233 SBSQ HOSP IP/OBS HIGH 50: CPT | Performed by: INTERNAL MEDICINE

## 2019-01-01 PROCEDURE — 0DJ08ZZ INSPECTION OF UPPER INTESTINAL TRACT, VIA NATURAL OR ARTIFICIAL OPENING ENDOSCOPIC: ICD-10-PCS | Performed by: OTOLARYNGOLOGY

## 2019-01-01 PROCEDURE — 85025 COMPLETE CBC W/AUTO DIFF WBC: CPT | Performed by: SURGERY

## 2019-01-01 PROCEDURE — 3E0G76Z INTRODUCTION OF NUTRITIONAL SUBSTANCE INTO UPPER GI, VIA NATURAL OR ARTIFICIAL OPENING: ICD-10-PCS | Performed by: SURGERY

## 2019-01-01 PROCEDURE — 36600 WITHDRAWAL OF ARTERIAL BLOOD: CPT

## 2019-01-01 PROCEDURE — 25010000002 MIDAZOLAM PER 1 MG

## 2019-01-01 PROCEDURE — 0DH68UZ INSERTION OF FEEDING DEVICE INTO STOMACH, VIA NATURAL OR ARTIFICIAL OPENING ENDOSCOPIC: ICD-10-PCS | Performed by: SURGERY

## 2019-01-01 PROCEDURE — 25010000002 EPINEPHRINE PER 0.1 MG: Performed by: OTOLARYNGOLOGY

## 2019-01-01 PROCEDURE — 84439 ASSAY OF FREE THYROXINE: CPT | Performed by: INTERNAL MEDICINE

## 2019-01-01 PROCEDURE — 83036 HEMOGLOBIN GLYCOSYLATED A1C: CPT | Performed by: INTERNAL MEDICINE

## 2019-01-01 PROCEDURE — 25010000003 CEFAZOLIN 1-4 GM/50ML-% SOLUTION: Performed by: OTOLARYNGOLOGY

## 2019-01-01 PROCEDURE — 93005 ELECTROCARDIOGRAM TRACING: CPT

## 2019-01-01 PROCEDURE — 99238 HOSP IP/OBS DSCHRG MGMT 30/<: CPT | Performed by: SURGERY

## 2019-01-01 PROCEDURE — 36416 COLLJ CAPILLARY BLOOD SPEC: CPT | Performed by: INTERNAL MEDICINE

## 2019-01-01 PROCEDURE — 85027 COMPLETE CBC AUTOMATED: CPT | Performed by: HOSPITALIST

## 2019-01-01 PROCEDURE — 85025 COMPLETE CBC W/AUTO DIFF WBC: CPT | Performed by: EMERGENCY MEDICINE

## 2019-01-01 PROCEDURE — 25010000002 LORAZEPAM PER 2 MG: Performed by: INTERNAL MEDICINE

## 2019-01-01 PROCEDURE — 71045 X-RAY EXAM CHEST 1 VIEW: CPT

## 2019-01-01 PROCEDURE — 36415 COLL VENOUS BLD VENIPUNCTURE: CPT

## 2019-01-01 PROCEDURE — 25010000002 PHENYLEPHRINE PER 1 ML: Performed by: NURSE ANESTHETIST, CERTIFIED REGISTERED

## 2019-01-01 PROCEDURE — 25010000002 DEXAMETHASONE PER 1 MG: Performed by: NURSE ANESTHETIST, CERTIFIED REGISTERED

## 2019-01-01 PROCEDURE — 94002 VENT MGMT INPAT INIT DAY: CPT

## 2019-01-01 PROCEDURE — 25010000002 FENTANYL CITRATE (PF) 250 MCG/5ML SOLUTION

## 2019-01-01 PROCEDURE — 84484 ASSAY OF TROPONIN QUANT: CPT | Performed by: INTERNAL MEDICINE

## 2019-01-01 PROCEDURE — 99222 1ST HOSP IP/OBS MODERATE 55: CPT | Performed by: SURGERY

## 2019-01-01 PROCEDURE — 25010000002 IOPAMIDOL 61 % SOLUTION: Performed by: OTOLARYNGOLOGY

## 2019-01-01 PROCEDURE — 25010000002 FUROSEMIDE PER 20 MG: Performed by: SURGERY

## 2019-01-01 PROCEDURE — 25010000002 DEXAMETHASONE PER 1 MG: Performed by: ANESTHESIOLOGY

## 2019-01-01 PROCEDURE — 0DH63UZ INSERTION OF FEEDING DEVICE INTO STOMACH, PERCUTANEOUS APPROACH: ICD-10-PCS | Performed by: SURGERY

## 2019-01-01 PROCEDURE — 88305 TISSUE EXAM BY PATHOLOGIST: CPT | Performed by: OTOLARYNGOLOGY

## 2019-01-01 PROCEDURE — 0BH17EZ INSERTION OF ENDOTRACHEAL AIRWAY INTO TRACHEA, VIA NATURAL OR ARTIFICIAL OPENING: ICD-10-PCS | Performed by: INTERNAL MEDICINE

## 2019-01-01 PROCEDURE — 74230 X-RAY XM SWLNG FUNCJ C+: CPT

## 2019-01-01 PROCEDURE — 93970 EXTREMITY STUDY: CPT

## 2019-01-01 PROCEDURE — 25010000002 ONDANSETRON PER 1 MG: Performed by: NURSE ANESTHETIST, CERTIFIED REGISTERED

## 2019-01-01 PROCEDURE — 99213 OFFICE O/P EST LOW 20 MIN: CPT | Performed by: SURGERY

## 2019-01-01 PROCEDURE — 99283 EMERGENCY DEPT VISIT LOW MDM: CPT

## 2019-01-01 PROCEDURE — 92610 EVALUATE SWALLOWING FUNCTION: CPT

## 2019-01-01 PROCEDURE — 25010000002 DEXAMETHASONE PER 1 MG: Performed by: INTERNAL MEDICINE

## 2019-01-01 PROCEDURE — 85027 COMPLETE CBC AUTOMATED: CPT | Performed by: OTOLARYNGOLOGY

## 2019-01-01 PROCEDURE — 25010000003 CEFAZOLIN IN DEXTROSE 2-4 GM/100ML-% SOLUTION: Performed by: NURSE ANESTHETIST, CERTIFIED REGISTERED

## 2019-01-01 PROCEDURE — 99222 1ST HOSP IP/OBS MODERATE 55: CPT | Performed by: RADIOLOGY

## 2019-01-01 PROCEDURE — 25010000002 PIPERACILLIN SOD-TAZOBACTAM PER 1 G: Performed by: EMERGENCY MEDICINE

## 2019-01-01 PROCEDURE — 80048 BASIC METABOLIC PNL TOTAL CA: CPT | Performed by: OTOLARYNGOLOGY

## 2019-01-01 PROCEDURE — 25010000002 MORPHINE PER 10 MG: Performed by: INTERNAL MEDICINE

## 2019-01-01 PROCEDURE — 97535 SELF CARE MNGMENT TRAINING: CPT

## 2019-01-01 RX ORDER — ACETAMINOPHEN 650 MG/1
650 SUPPOSITORY RECTAL EVERY 4 HOURS PRN
Status: DISCONTINUED | OUTPATIENT
Start: 2019-01-01 | End: 2019-01-01 | Stop reason: HOSPADM

## 2019-01-01 RX ORDER — LIDOCAINE HYDROCHLORIDE 20 MG/ML
INJECTION, SOLUTION INFILTRATION; PERINEURAL AS NEEDED
Status: DISCONTINUED | OUTPATIENT
Start: 2019-01-01 | End: 2019-01-01 | Stop reason: SURG

## 2019-01-01 RX ORDER — MIDAZOLAM HYDROCHLORIDE 1 MG/ML
2 INJECTION INTRAMUSCULAR; INTRAVENOUS ONCE
Status: COMPLETED | OUTPATIENT
Start: 2019-01-01 | End: 2019-01-01

## 2019-01-01 RX ORDER — METHYLPREDNISOLONE SODIUM SUCCINATE 125 MG/2ML
125 INJECTION, POWDER, LYOPHILIZED, FOR SOLUTION INTRAMUSCULAR; INTRAVENOUS ONCE
Status: DISCONTINUED | OUTPATIENT
Start: 2019-01-01 | End: 2019-01-01

## 2019-01-01 RX ORDER — MONTELUKAST SODIUM 10 MG/1
10 TABLET ORAL NIGHTLY
Status: DISCONTINUED | OUTPATIENT
Start: 2019-01-01 | End: 2019-01-01 | Stop reason: HOSPADM

## 2019-01-01 RX ORDER — HYDRALAZINE HYDROCHLORIDE 20 MG/ML
5 INJECTION INTRAMUSCULAR; INTRAVENOUS
Status: DISCONTINUED | OUTPATIENT
Start: 2019-01-01 | End: 2019-01-01

## 2019-01-01 RX ORDER — METHYLPREDNISOLONE SODIUM SUCCINATE 40 MG/ML
20 INJECTION, POWDER, LYOPHILIZED, FOR SOLUTION INTRAMUSCULAR; INTRAVENOUS EVERY 24 HOURS
Status: DISCONTINUED | OUTPATIENT
Start: 2019-01-01 | End: 2019-01-01

## 2019-01-01 RX ORDER — OXYCODONE AND ACETAMINOPHEN 7.5; 325 MG/1; MG/1
1 TABLET ORAL ONCE AS NEEDED
Status: DISCONTINUED | OUTPATIENT
Start: 2019-01-01 | End: 2019-01-01 | Stop reason: HOSPADM

## 2019-01-01 RX ORDER — NICOTINE POLACRILEX 4 MG
15 LOZENGE BUCCAL
Status: DISCONTINUED | OUTPATIENT
Start: 2019-01-01 | End: 2019-01-01 | Stop reason: HOSPADM

## 2019-01-01 RX ORDER — LORAZEPAM 2 MG/ML
1 INJECTION INTRAMUSCULAR
Status: DISCONTINUED | OUTPATIENT
Start: 2019-01-01 | End: 2019-01-01 | Stop reason: HOSPADM

## 2019-01-01 RX ORDER — ALBUTEROL SULFATE 2.5 MG/3ML
2.5 SOLUTION RESPIRATORY (INHALATION) EVERY 6 HOURS PRN
Status: DISCONTINUED | OUTPATIENT
Start: 2019-01-01 | End: 2019-01-01 | Stop reason: HOSPADM

## 2019-01-01 RX ORDER — PROPOFOL 10 MG/ML
VIAL (ML) INTRAVENOUS AS NEEDED
Status: DISCONTINUED | OUTPATIENT
Start: 2019-01-01 | End: 2019-01-01 | Stop reason: SURG

## 2019-01-01 RX ORDER — ACETAMINOPHEN 325 MG/1
650 TABLET ORAL EVERY 4 HOURS PRN
Status: DISCONTINUED | OUTPATIENT
Start: 2019-01-01 | End: 2019-01-01 | Stop reason: HOSPADM

## 2019-01-01 RX ORDER — ONDANSETRON 2 MG/ML
8 INJECTION INTRAMUSCULAR; INTRAVENOUS EVERY 6 HOURS PRN
Status: DISCONTINUED | OUTPATIENT
Start: 2019-01-01 | End: 2019-01-01 | Stop reason: HOSPADM

## 2019-01-01 RX ORDER — PROPOFOL 10 MG/ML
VIAL (ML) INTRAVENOUS CONTINUOUS PRN
Status: DISCONTINUED | OUTPATIENT
Start: 2019-01-01 | End: 2019-01-01 | Stop reason: SURG

## 2019-01-01 RX ORDER — ALBUTEROL SULFATE 90 UG/1
2 AEROSOL, METERED RESPIRATORY (INHALATION) EVERY 4 HOURS PRN
COMMUNITY

## 2019-01-01 RX ORDER — METHYLPREDNISOLONE 4 MG/1
8 TABLET ORAL
Status: COMPLETED | OUTPATIENT
Start: 2019-01-01 | End: 2019-01-01

## 2019-01-01 RX ORDER — FUROSEMIDE 20 MG/1
TABLET ORAL
Refills: 0 | COMMUNITY
Start: 2019-01-01

## 2019-01-01 RX ORDER — FUROSEMIDE 10 MG/ML
40 INJECTION INTRAMUSCULAR; INTRAVENOUS ONCE
Status: COMPLETED | OUTPATIENT
Start: 2019-01-01 | End: 2019-01-01

## 2019-01-01 RX ORDER — FENTANYL CITRATE 50 UG/ML
INJECTION, SOLUTION INTRAMUSCULAR; INTRAVENOUS
Status: COMPLETED
Start: 2019-01-01 | End: 2019-01-01

## 2019-01-01 RX ORDER — POTASSIUM CHLORIDE 750 MG/1
TABLET, FILM COATED, EXTENDED RELEASE ORAL
Refills: 0 | COMMUNITY
Start: 2019-01-01

## 2019-01-01 RX ORDER — HYDRALAZINE HYDROCHLORIDE 20 MG/ML
5 INJECTION INTRAMUSCULAR; INTRAVENOUS
Status: DISCONTINUED | OUTPATIENT
Start: 2019-01-01 | End: 2019-01-01 | Stop reason: HOSPADM

## 2019-01-01 RX ORDER — DEXTROSE MONOHYDRATE 25 G/50ML
25 INJECTION, SOLUTION INTRAVENOUS
Status: DISCONTINUED | OUTPATIENT
Start: 2019-01-01 | End: 2019-01-01 | Stop reason: HOSPADM

## 2019-01-01 RX ORDER — METHYLPREDNISOLONE SODIUM SUCCINATE 40 MG/ML
40 INJECTION, POWDER, LYOPHILIZED, FOR SOLUTION INTRAMUSCULAR; INTRAVENOUS EVERY 24 HOURS
Status: DISCONTINUED | OUTPATIENT
Start: 2019-01-01 | End: 2019-01-01

## 2019-01-01 RX ORDER — POTASSIUM CHLORIDE 750 MG/1
40 CAPSULE, EXTENDED RELEASE ORAL AS NEEDED
Status: DISCONTINUED | OUTPATIENT
Start: 2019-01-01 | End: 2019-01-01

## 2019-01-01 RX ORDER — IPRATROPIUM BROMIDE AND ALBUTEROL SULFATE 2.5; .5 MG/3ML; MG/3ML
3 SOLUTION RESPIRATORY (INHALATION) EVERY 4 HOURS PRN
COMMUNITY

## 2019-01-01 RX ORDER — SUCCINYLCHOLINE CHLORIDE 20 MG/ML
INJECTION INTRAMUSCULAR; INTRAVENOUS
Status: DISCONTINUED
Start: 2019-01-01 | End: 2019-01-01 | Stop reason: WASHOUT

## 2019-01-01 RX ORDER — MORPHINE SULFATE 20 MG/ML
5 SOLUTION ORAL
Status: DISCONTINUED | OUTPATIENT
Start: 2019-01-01 | End: 2019-01-01 | Stop reason: HOSPADM

## 2019-01-01 RX ORDER — MORPHINE SULFATE 2 MG/ML
2 INJECTION, SOLUTION INTRAMUSCULAR; INTRAVENOUS
Status: DISCONTINUED | OUTPATIENT
Start: 2019-01-01 | End: 2019-01-01 | Stop reason: HOSPADM

## 2019-01-01 RX ORDER — ACETAMINOPHEN 500 MG
500 TABLET ORAL EVERY 4 HOURS PRN
COMMUNITY

## 2019-01-01 RX ORDER — ALBUTEROL SULFATE 90 UG/1
2 AEROSOL, METERED RESPIRATORY (INHALATION) EVERY 4 HOURS PRN
Status: DISCONTINUED | OUTPATIENT
Start: 2019-01-01 | End: 2019-01-01 | Stop reason: CLARIF

## 2019-01-01 RX ORDER — PROMETHAZINE HYDROCHLORIDE 25 MG/ML
12.5 INJECTION, SOLUTION INTRAMUSCULAR; INTRAVENOUS EVERY 6 HOURS PRN
Status: DISCONTINUED | OUTPATIENT
Start: 2019-01-01 | End: 2019-01-01 | Stop reason: HOSPADM

## 2019-01-01 RX ORDER — METHYLPREDNISOLONE 4 MG/1
4 TABLET ORAL
Status: COMPLETED | OUTPATIENT
Start: 2019-01-01 | End: 2019-01-01

## 2019-01-01 RX ORDER — SODIUM CHLORIDE, SODIUM LACTATE, POTASSIUM CHLORIDE, CALCIUM CHLORIDE 600; 310; 30; 20 MG/100ML; MG/100ML; MG/100ML; MG/100ML
9 INJECTION, SOLUTION INTRAVENOUS CONTINUOUS
Status: DISCONTINUED | OUTPATIENT
Start: 2019-01-01 | End: 2019-01-01

## 2019-01-01 RX ORDER — NALOXONE HCL 0.4 MG/ML
0.4 VIAL (ML) INJECTION
Status: DISCONTINUED | OUTPATIENT
Start: 2019-01-01 | End: 2019-01-01 | Stop reason: HOSPADM

## 2019-01-01 RX ORDER — PROMETHAZINE HYDROCHLORIDE 12.5 MG/1
6.25 SUPPOSITORY RECTAL EVERY 4 HOURS PRN
Status: DISCONTINUED | OUTPATIENT
Start: 2019-01-01 | End: 2019-01-01 | Stop reason: HOSPADM

## 2019-01-01 RX ORDER — AMOXICILLIN 500 MG/1
1000 CAPSULE ORAL 2 TIMES DAILY
COMMUNITY
End: 2019-01-01 | Stop reason: HOSPADM

## 2019-01-01 RX ORDER — ACETAMINOPHEN 160 MG/5ML
650 SOLUTION ORAL EVERY 4 HOURS PRN
Status: DISCONTINUED | OUTPATIENT
Start: 2019-01-01 | End: 2019-01-01 | Stop reason: HOSPADM

## 2019-01-01 RX ORDER — GLYCOPYRROLATE 0.2 MG/ML
0.4 INJECTION INTRAMUSCULAR; INTRAVENOUS
Status: DISCONTINUED | OUTPATIENT
Start: 2019-01-01 | End: 2019-01-01 | Stop reason: HOSPADM

## 2019-01-01 RX ORDER — SODIUM CHLORIDE 0.9 % (FLUSH) 0.9 %
3-10 SYRINGE (ML) INJECTION AS NEEDED
Status: DISCONTINUED | OUTPATIENT
Start: 2019-01-01 | End: 2019-01-01 | Stop reason: HOSPADM

## 2019-01-01 RX ORDER — SODIUM CHLORIDE 9 MG/ML
125 INJECTION, SOLUTION INTRAVENOUS CONTINUOUS
Status: DISCONTINUED | OUTPATIENT
Start: 2019-01-01 | End: 2019-01-01

## 2019-01-01 RX ORDER — PROMETHAZINE HYDROCHLORIDE 6.25 MG/5ML
6.25 SYRUP ORAL EVERY 4 HOURS PRN
Status: DISCONTINUED | OUTPATIENT
Start: 2019-01-01 | End: 2019-01-01 | Stop reason: HOSPADM

## 2019-01-01 RX ORDER — PROMETHAZINE HYDROCHLORIDE 25 MG/ML
12.5 INJECTION, SOLUTION INTRAMUSCULAR; INTRAVENOUS ONCE AS NEEDED
Status: DISCONTINUED | OUTPATIENT
Start: 2019-01-01 | End: 2019-01-01

## 2019-01-01 RX ORDER — CASTOR OIL AND BALSAM, PERU 788; 87 MG/G; MG/G
OINTMENT TOPICAL EVERY 12 HOURS SCHEDULED
Status: DISCONTINUED | OUTPATIENT
Start: 2019-01-01 | End: 2019-01-01 | Stop reason: HOSPADM

## 2019-01-01 RX ORDER — CARVEDILOL 3.12 MG/1
3.12 TABLET ORAL 2 TIMES DAILY WITH MEALS
Status: DISCONTINUED | OUTPATIENT
Start: 2019-01-01 | End: 2019-01-01 | Stop reason: HOSPADM

## 2019-01-01 RX ORDER — FUROSEMIDE 10 MG/ML
20 INJECTION INTRAMUSCULAR; INTRAVENOUS ONCE
Status: COMPLETED | OUTPATIENT
Start: 2019-01-01 | End: 2019-01-01

## 2019-01-01 RX ORDER — HYDROMORPHONE HYDROCHLORIDE 1 MG/ML
0.5 INJECTION, SOLUTION INTRAMUSCULAR; INTRAVENOUS; SUBCUTANEOUS
Status: DISCONTINUED | OUTPATIENT
Start: 2019-01-01 | End: 2019-01-01 | Stop reason: HOSPADM

## 2019-01-01 RX ORDER — ONDANSETRON 2 MG/ML
4 INJECTION INTRAMUSCULAR; INTRAVENOUS ONCE AS NEEDED
Status: DISCONTINUED | OUTPATIENT
Start: 2019-01-01 | End: 2019-01-01

## 2019-01-01 RX ORDER — DIPHENOXYLATE HYDROCHLORIDE AND ATROPINE SULFATE 2.5; .025 MG/1; MG/1
1 TABLET ORAL 4 TIMES DAILY PRN
COMMUNITY

## 2019-01-01 RX ORDER — HALOPERIDOL 1 MG/1
1 TABLET ORAL EVERY 4 HOURS PRN
Status: DISCONTINUED | OUTPATIENT
Start: 2019-01-01 | End: 2019-01-01 | Stop reason: HOSPADM

## 2019-01-01 RX ORDER — CEFAZOLIN SODIUM 1 G/50ML
1 INJECTION, SOLUTION INTRAVENOUS EVERY 8 HOURS
Status: DISPENSED | OUTPATIENT
Start: 2019-01-01 | End: 2019-01-01

## 2019-01-01 RX ORDER — HALOPERIDOL 5 MG/ML
1 INJECTION INTRAMUSCULAR EVERY 4 HOURS PRN
Status: DISCONTINUED | OUTPATIENT
Start: 2019-01-01 | End: 2019-01-01 | Stop reason: HOSPADM

## 2019-01-01 RX ORDER — SODIUM CHLORIDE 9 MG/ML
50 INJECTION, SOLUTION INTRAVENOUS CONTINUOUS
Status: DISCONTINUED | OUTPATIENT
Start: 2019-01-01 | End: 2019-01-01

## 2019-01-01 RX ORDER — MONTELUKAST SODIUM 10 MG/1
10 TABLET ORAL NIGHTLY
COMMUNITY

## 2019-01-01 RX ORDER — FAMOTIDINE 10 MG/ML
20 INJECTION, SOLUTION INTRAVENOUS ONCE
Status: COMPLETED | OUTPATIENT
Start: 2019-01-01 | End: 2019-01-01

## 2019-01-01 RX ORDER — EPHEDRINE SULFATE 50 MG/ML
5 INJECTION, SOLUTION INTRAVENOUS ONCE AS NEEDED
Status: DISCONTINUED | OUTPATIENT
Start: 2019-01-01 | End: 2019-01-01 | Stop reason: HOSPADM

## 2019-01-01 RX ORDER — FERROUS SULFATE 325(65) MG
TABLET ORAL
Refills: 0 | COMMUNITY
Start: 2019-01-01

## 2019-01-01 RX ORDER — HALOPERIDOL 2 MG/ML
1 SOLUTION ORAL EVERY 4 HOURS PRN
Status: DISCONTINUED | OUTPATIENT
Start: 2019-01-01 | End: 2019-01-01 | Stop reason: HOSPADM

## 2019-01-01 RX ORDER — SODIUM CHLORIDE 0.9 % (FLUSH) 0.9 %
1-10 SYRINGE (ML) INJECTION AS NEEDED
Status: DISCONTINUED | OUTPATIENT
Start: 2019-01-01 | End: 2019-01-01 | Stop reason: HOSPADM

## 2019-01-01 RX ORDER — ASPIRIN 81 MG/1
81 TABLET ORAL DAILY
Status: DISCONTINUED | OUTPATIENT
Start: 2019-01-01 | End: 2019-01-01 | Stop reason: HOSPADM

## 2019-01-01 RX ORDER — OXYCODONE AND ACETAMINOPHEN 7.5; 325 MG/1; MG/1
1 TABLET ORAL ONCE AS NEEDED
Status: DISCONTINUED | OUTPATIENT
Start: 2019-01-01 | End: 2019-01-01

## 2019-01-01 RX ORDER — MORPHINE SULFATE 2 MG/ML
4 INJECTION, SOLUTION INTRAMUSCULAR; INTRAVENOUS
Status: DISCONTINUED | OUTPATIENT
Start: 2019-01-01 | End: 2019-01-01

## 2019-01-01 RX ORDER — BISACODYL 10 MG
10 SUPPOSITORY, RECTAL RECTAL AS NEEDED
COMMUNITY
End: 2019-01-01

## 2019-01-01 RX ORDER — IPRATROPIUM BROMIDE AND ALBUTEROL SULFATE 2.5; .5 MG/3ML; MG/3ML
3 SOLUTION RESPIRATORY (INHALATION) EVERY 4 HOURS PRN
Status: DISCONTINUED | OUTPATIENT
Start: 2019-01-01 | End: 2019-01-01 | Stop reason: HOSPADM

## 2019-01-01 RX ORDER — CEFAZOLIN SODIUM 2 G/100ML
2 INJECTION, SOLUTION INTRAVENOUS ONCE
Status: COMPLETED | OUTPATIENT
Start: 2019-01-01 | End: 2019-01-01

## 2019-01-01 RX ORDER — PROMETHAZINE HYDROCHLORIDE 25 MG/ML
6.25 INJECTION, SOLUTION INTRAMUSCULAR; INTRAVENOUS EVERY 4 HOURS PRN
Status: DISCONTINUED | OUTPATIENT
Start: 2019-01-01 | End: 2019-01-01 | Stop reason: HOSPADM

## 2019-01-01 RX ORDER — DEXAMETHASONE SODIUM PHOSPHATE 10 MG/ML
INJECTION INTRAMUSCULAR; INTRAVENOUS AS NEEDED
Status: DISCONTINUED | OUTPATIENT
Start: 2019-01-01 | End: 2019-01-01 | Stop reason: SURG

## 2019-01-01 RX ORDER — HYDROCODONE BITARTRATE AND ACETAMINOPHEN 7.5; 325 MG/1; MG/1
1 TABLET ORAL ONCE AS NEEDED
Status: DISCONTINUED | OUTPATIENT
Start: 2019-01-01 | End: 2019-01-01

## 2019-01-01 RX ORDER — SODIUM CHLORIDE 0.9 % (FLUSH) 0.9 %
3 SYRINGE (ML) INJECTION EVERY 12 HOURS SCHEDULED
Status: DISCONTINUED | OUTPATIENT
Start: 2019-01-01 | End: 2019-01-01 | Stop reason: HOSPADM

## 2019-01-01 RX ORDER — FLUMAZENIL 0.1 MG/ML
0.2 INJECTION INTRAVENOUS AS NEEDED
Status: DISCONTINUED | OUTPATIENT
Start: 2019-01-01 | End: 2019-01-01 | Stop reason: HOSPADM

## 2019-01-01 RX ORDER — SENNA AND DOCUSATE SODIUM 50; 8.6 MG/1; MG/1
1 TABLET, FILM COATED ORAL 2 TIMES DAILY PRN
COMMUNITY

## 2019-01-01 RX ORDER — FENTANYL CITRATE 50 UG/ML
INJECTION, SOLUTION INTRAMUSCULAR; INTRAVENOUS AS NEEDED
Status: DISCONTINUED | OUTPATIENT
Start: 2019-01-01 | End: 2019-01-01 | Stop reason: SURG

## 2019-01-01 RX ORDER — PROMETHAZINE HYDROCHLORIDE 25 MG/1
12.5 SUPPOSITORY RECTAL EVERY 4 HOURS PRN
Status: DISCONTINUED | OUTPATIENT
Start: 2019-01-01 | End: 2019-01-01 | Stop reason: HOSPADM

## 2019-01-01 RX ORDER — FENTANYL CITRATE 50 UG/ML
50 INJECTION, SOLUTION INTRAMUSCULAR; INTRAVENOUS
Status: DISCONTINUED | OUTPATIENT
Start: 2019-01-01 | End: 2019-01-01

## 2019-01-01 RX ORDER — MAGNESIUM HYDROXIDE 1200 MG/15ML
LIQUID ORAL AS NEEDED
Status: DISCONTINUED | OUTPATIENT
Start: 2019-01-01 | End: 2019-01-01 | Stop reason: HOSPADM

## 2019-01-01 RX ORDER — MORPHINE SULFATE 2 MG/ML
4 INJECTION, SOLUTION INTRAMUSCULAR; INTRAVENOUS
Status: DISCONTINUED | OUTPATIENT
Start: 2019-01-01 | End: 2019-01-01 | Stop reason: HOSPADM

## 2019-01-01 RX ORDER — GLYCOPYRROLATE 0.2 MG/ML
0.2 INJECTION INTRAMUSCULAR; INTRAVENOUS
Status: DISCONTINUED | OUTPATIENT
Start: 2019-01-01 | End: 2019-01-01 | Stop reason: HOSPADM

## 2019-01-01 RX ORDER — ONDANSETRON 2 MG/ML
4 INJECTION INTRAMUSCULAR; INTRAVENOUS ONCE AS NEEDED
Status: DISCONTINUED | OUTPATIENT
Start: 2019-01-01 | End: 2019-01-01 | Stop reason: HOSPADM

## 2019-01-01 RX ORDER — DIPHENHYDRAMINE HCL 25 MG
25 CAPSULE ORAL
Status: DISCONTINUED | OUTPATIENT
Start: 2019-01-01 | End: 2019-01-01

## 2019-01-01 RX ORDER — MIDAZOLAM HYDROCHLORIDE 1 MG/ML
2 INJECTION INTRAMUSCULAR; INTRAVENOUS
Status: DISCONTINUED | OUTPATIENT
Start: 2019-01-01 | End: 2019-01-01 | Stop reason: HOSPADM

## 2019-01-01 RX ORDER — MIDAZOLAM HYDROCHLORIDE 1 MG/ML
INJECTION INTRAMUSCULAR; INTRAVENOUS
Status: COMPLETED
Start: 2019-01-01 | End: 2019-01-01

## 2019-01-01 RX ORDER — FUROSEMIDE 20 MG/1
20 TABLET ORAL DAILY
COMMUNITY
End: 2019-01-01 | Stop reason: HOSPADM

## 2019-01-01 RX ORDER — HYDROCHLOROTHIAZIDE 25 MG/1
25 TABLET ORAL DAILY
Status: DISCONTINUED | OUTPATIENT
Start: 2019-01-01 | End: 2019-01-01

## 2019-01-01 RX ORDER — HALOPERIDOL 1 MG/1
2 TABLET ORAL EVERY 4 HOURS PRN
Status: DISCONTINUED | OUTPATIENT
Start: 2019-01-01 | End: 2019-01-01 | Stop reason: HOSPADM

## 2019-01-01 RX ORDER — GLIMEPIRIDE 4 MG/1
8 TABLET ORAL EVERY EVENING
COMMUNITY
End: 2019-01-01 | Stop reason: HOSPADM

## 2019-01-01 RX ORDER — NALOXONE HYDROCHLORIDE 0.4 MG/ML
INJECTION, SOLUTION INTRAMUSCULAR; INTRAVENOUS; SUBCUTANEOUS AS NEEDED
Status: DISCONTINUED | OUTPATIENT
Start: 2019-01-01 | End: 2019-01-01 | Stop reason: SURG

## 2019-01-01 RX ORDER — DEXAMETHASONE SODIUM PHOSPHATE 4 MG/ML
INJECTION, SOLUTION INTRA-ARTICULAR; INTRALESIONAL; INTRAMUSCULAR; INTRAVENOUS; SOFT TISSUE
Status: COMPLETED
Start: 2019-01-01 | End: 2019-01-01

## 2019-01-01 RX ORDER — EPHEDRINE SULFATE 50 MG/ML
5 INJECTION, SOLUTION INTRAVENOUS ONCE AS NEEDED
Status: DISCONTINUED | OUTPATIENT
Start: 2019-01-01 | End: 2019-01-01

## 2019-01-01 RX ORDER — LIDOCAINE HYDROCHLORIDE 10 MG/ML
0.5 INJECTION, SOLUTION EPIDURAL; INFILTRATION; INTRACAUDAL; PERINEURAL ONCE AS NEEDED
Status: DISCONTINUED | OUTPATIENT
Start: 2019-01-01 | End: 2019-01-01 | Stop reason: HOSPADM

## 2019-01-01 RX ORDER — FAMOTIDINE 10 MG/ML
20 INJECTION, SOLUTION INTRAVENOUS EVERY 12 HOURS SCHEDULED
Status: DISCONTINUED | OUTPATIENT
Start: 2019-01-01 | End: 2019-01-01

## 2019-01-01 RX ORDER — PROMETHAZINE HYDROCHLORIDE 25 MG/ML
12.5 INJECTION, SOLUTION INTRAMUSCULAR; INTRAVENOUS ONCE AS NEEDED
Status: DISCONTINUED | OUTPATIENT
Start: 2019-01-01 | End: 2019-01-01 | Stop reason: HOSPADM

## 2019-01-01 RX ORDER — DIPHENOXYLATE HYDROCHLORIDE AND ATROPINE SULFATE 2.5; .025 MG/1; MG/1
TABLET ORAL
Refills: 0 | COMMUNITY
Start: 2019-01-01

## 2019-01-01 RX ORDER — FAMOTIDINE 20 MG/1
20 TABLET, FILM COATED ORAL 2 TIMES DAILY
Status: DISCONTINUED | OUTPATIENT
Start: 2019-01-01 | End: 2019-01-01

## 2019-01-01 RX ORDER — LABETALOL HYDROCHLORIDE 5 MG/ML
5 INJECTION, SOLUTION INTRAVENOUS
Status: DISCONTINUED | OUTPATIENT
Start: 2019-01-01 | End: 2019-01-01 | Stop reason: HOSPADM

## 2019-01-01 RX ORDER — COLLAGENASE SANTYL 250 [ARB'U]/G
OINTMENT TOPICAL
Refills: 0 | COMMUNITY
Start: 2019-01-01

## 2019-01-01 RX ORDER — METHYLPREDNISOLONE SODIUM SUCCINATE 40 MG/ML
40 INJECTION, POWDER, LYOPHILIZED, FOR SOLUTION INTRAMUSCULAR; INTRAVENOUS EVERY 8 HOURS
Status: DISCONTINUED | OUTPATIENT
Start: 2019-01-01 | End: 2019-01-01

## 2019-01-01 RX ORDER — NALOXONE HCL 0.4 MG/ML
0.2 VIAL (ML) INJECTION AS NEEDED
Status: DISCONTINUED | OUTPATIENT
Start: 2019-01-01 | End: 2019-01-01

## 2019-01-01 RX ORDER — MEPERIDINE HYDROCHLORIDE 25 MG/ML
12.5 INJECTION INTRAMUSCULAR; INTRAVENOUS; SUBCUTANEOUS
Status: DISCONTINUED | OUTPATIENT
Start: 2019-01-01 | End: 2019-01-01 | Stop reason: HOSPADM

## 2019-01-01 RX ORDER — HALOPERIDOL 2 MG/ML
2 SOLUTION ORAL EVERY 4 HOURS PRN
Status: DISCONTINUED | OUTPATIENT
Start: 2019-01-01 | End: 2019-01-01 | Stop reason: HOSPADM

## 2019-01-01 RX ORDER — PROMETHAZINE HYDROCHLORIDE 25 MG/1
25 TABLET ORAL ONCE AS NEEDED
Status: DISCONTINUED | OUTPATIENT
Start: 2019-01-01 | End: 2019-01-01 | Stop reason: HOSPADM

## 2019-01-01 RX ORDER — LORAZEPAM 2 MG/ML
2 CONCENTRATE ORAL
Status: DISCONTINUED | OUTPATIENT
Start: 2019-01-01 | End: 2019-01-01 | Stop reason: HOSPADM

## 2019-01-01 RX ORDER — FUROSEMIDE 20 MG/1
20 TABLET ORAL DAILY
Status: DISCONTINUED | OUTPATIENT
Start: 2019-01-01 | End: 2019-01-01

## 2019-01-01 RX ORDER — PROMETHAZINE HYDROCHLORIDE 25 MG/ML
12.5 INJECTION, SOLUTION INTRAMUSCULAR; INTRAVENOUS EVERY 4 HOURS PRN
Status: DISCONTINUED | OUTPATIENT
Start: 2019-01-01 | End: 2019-01-01 | Stop reason: HOSPADM

## 2019-01-01 RX ORDER — ONDANSETRON 2 MG/ML
4 INJECTION INTRAMUSCULAR; INTRAVENOUS EVERY 6 HOURS PRN
Status: DISCONTINUED | OUTPATIENT
Start: 2019-01-01 | End: 2019-01-01 | Stop reason: HOSPADM

## 2019-01-01 RX ORDER — NALOXONE HCL 0.4 MG/ML
0.2 VIAL (ML) INJECTION AS NEEDED
Status: DISCONTINUED | OUTPATIENT
Start: 2019-01-01 | End: 2019-01-01 | Stop reason: HOSPADM

## 2019-01-01 RX ORDER — SODIUM CHLORIDE, SODIUM LACTATE, POTASSIUM CHLORIDE, CALCIUM CHLORIDE 600; 310; 30; 20 MG/100ML; MG/100ML; MG/100ML; MG/100ML
125 INJECTION, SOLUTION INTRAVENOUS CONTINUOUS
Status: DISCONTINUED | OUTPATIENT
Start: 2019-01-01 | End: 2019-01-01

## 2019-01-01 RX ORDER — PROMETHAZINE HYDROCHLORIDE 25 MG/1
25 TABLET ORAL ONCE AS NEEDED
Status: DISCONTINUED | OUTPATIENT
Start: 2019-01-01 | End: 2019-01-01

## 2019-01-01 RX ORDER — ACETAMINOPHEN 650 MG/1
650 SUPPOSITORY RECTAL ONCE AS NEEDED
Status: DISCONTINUED | OUTPATIENT
Start: 2019-01-01 | End: 2019-01-01

## 2019-01-01 RX ORDER — DEXTROSE, SODIUM CHLORIDE, AND POTASSIUM CHLORIDE 5; .45; .15 G/100ML; G/100ML; G/100ML
75 INJECTION INTRAVENOUS CONTINUOUS
Status: DISCONTINUED | OUTPATIENT
Start: 2019-01-01 | End: 2019-01-01 | Stop reason: HOSPADM

## 2019-01-01 RX ORDER — AMOXICILLIN 400 MG/5ML
POWDER, FOR SUSPENSION ORAL
Refills: 0 | COMMUNITY
Start: 2019-01-01

## 2019-01-01 RX ORDER — SULFAMETHOXAZOLE AND TRIMETHOPRIM 800; 160 MG/1; MG/1
1 TABLET ORAL 2 TIMES DAILY
COMMUNITY
End: 2019-01-01

## 2019-01-01 RX ORDER — LORAZEPAM 2 MG/ML
2 INJECTION INTRAMUSCULAR
Status: DISCONTINUED | OUTPATIENT
Start: 2019-01-01 | End: 2019-01-01 | Stop reason: HOSPADM

## 2019-01-01 RX ORDER — HYDROMORPHONE HYDROCHLORIDE 1 MG/ML
0.5 INJECTION, SOLUTION INTRAMUSCULAR; INTRAVENOUS; SUBCUTANEOUS
Status: DISCONTINUED | OUTPATIENT
Start: 2019-01-01 | End: 2019-01-01

## 2019-01-01 RX ORDER — ENALAPRILAT 2.5 MG/2ML
1.25 INJECTION INTRAVENOUS EVERY 6 HOURS PRN
Status: DISCONTINUED | OUTPATIENT
Start: 2019-01-01 | End: 2019-01-01 | Stop reason: HOSPADM

## 2019-01-01 RX ORDER — LORAZEPAM 2 MG/ML
0.5 CONCENTRATE ORAL
Status: DISCONTINUED | OUTPATIENT
Start: 2019-01-01 | End: 2019-01-01 | Stop reason: HOSPADM

## 2019-01-01 RX ORDER — LABETALOL HYDROCHLORIDE 5 MG/ML
5 INJECTION, SOLUTION INTRAVENOUS
Status: DISCONTINUED | OUTPATIENT
Start: 2019-01-01 | End: 2019-01-01

## 2019-01-01 RX ORDER — SODIUM CHLORIDE, SODIUM LACTATE, POTASSIUM CHLORIDE, CALCIUM CHLORIDE 600; 310; 30; 20 MG/100ML; MG/100ML; MG/100ML; MG/100ML
9 INJECTION, SOLUTION INTRAVENOUS CONTINUOUS PRN
Status: DISCONTINUED | OUTPATIENT
Start: 2019-01-01 | End: 2019-01-01 | Stop reason: HOSPADM

## 2019-01-01 RX ORDER — PROMETHAZINE HYDROCHLORIDE 25 MG/1
6.25 TABLET ORAL EVERY 4 HOURS PRN
Status: DISCONTINUED | OUTPATIENT
Start: 2019-01-01 | End: 2019-01-01 | Stop reason: HOSPADM

## 2019-01-01 RX ORDER — PROMETHAZINE HYDROCHLORIDE 25 MG/1
25 SUPPOSITORY RECTAL ONCE AS NEEDED
Status: DISCONTINUED | OUTPATIENT
Start: 2019-01-01 | End: 2019-01-01

## 2019-01-01 RX ORDER — PROMETHAZINE HYDROCHLORIDE 25 MG/1
25 SUPPOSITORY RECTAL ONCE AS NEEDED
Status: DISCONTINUED | OUTPATIENT
Start: 2019-01-01 | End: 2019-01-01 | Stop reason: HOSPADM

## 2019-01-01 RX ORDER — MORPHINE SULFATE 20 MG/ML
10 SOLUTION ORAL
Status: DISCONTINUED | OUTPATIENT
Start: 2019-01-01 | End: 2019-01-01 | Stop reason: HOSPADM

## 2019-01-01 RX ORDER — CARVEDILOL 3.12 MG/1
3.12 TABLET ORAL 2 TIMES DAILY WITH MEALS
Status: DISCONTINUED | OUTPATIENT
Start: 2019-01-01 | End: 2019-01-01

## 2019-01-01 RX ORDER — GLYCOPYRROLATE 0.2 MG/ML
INJECTION INTRAMUSCULAR; INTRAVENOUS AS NEEDED
Status: DISCONTINUED | OUTPATIENT
Start: 2019-01-01 | End: 2019-01-01 | Stop reason: SURG

## 2019-01-01 RX ORDER — FENTANYL CITRATE 50 UG/ML
50 INJECTION, SOLUTION INTRAMUSCULAR; INTRAVENOUS
Status: DISCONTINUED | OUTPATIENT
Start: 2019-01-01 | End: 2019-01-01 | Stop reason: HOSPADM

## 2019-01-01 RX ORDER — DIPHENHYDRAMINE HCL 25 MG
25 CAPSULE ORAL
Status: DISCONTINUED | OUTPATIENT
Start: 2019-01-01 | End: 2019-01-01 | Stop reason: HOSPADM

## 2019-01-01 RX ORDER — POTASSIUM CHLORIDE 7.45 MG/ML
10 INJECTION INTRAVENOUS
Status: DISCONTINUED | OUTPATIENT
Start: 2019-01-01 | End: 2019-01-01

## 2019-01-01 RX ORDER — LISINOPRIL 20 MG/1
20 TABLET ORAL DAILY
Status: DISCONTINUED | OUTPATIENT
Start: 2019-01-01 | End: 2019-01-01

## 2019-01-01 RX ORDER — SODIUM CHLORIDE 0.9 % (FLUSH) 0.9 %
10 SYRINGE (ML) INJECTION AS NEEDED
Status: DISCONTINUED | OUTPATIENT
Start: 2019-01-01 | End: 2019-01-01 | Stop reason: HOSPADM

## 2019-01-01 RX ORDER — FLUMAZENIL 0.1 MG/ML
0.2 INJECTION INTRAVENOUS AS NEEDED
Status: DISCONTINUED | OUTPATIENT
Start: 2019-01-01 | End: 2019-01-01

## 2019-01-01 RX ORDER — ACETAMINOPHEN 325 MG/1
650 TABLET ORAL ONCE AS NEEDED
Status: DISCONTINUED | OUTPATIENT
Start: 2019-01-01 | End: 2019-01-01

## 2019-01-01 RX ORDER — FENTANYL CITRATE 50 UG/ML
25 INJECTION, SOLUTION INTRAMUSCULAR; INTRAVENOUS
Status: DISCONTINUED | OUTPATIENT
Start: 2019-01-01 | End: 2019-01-01

## 2019-01-01 RX ORDER — SODIUM CHLORIDE 450 MG/100ML
50 INJECTION, SOLUTION INTRAVENOUS CONTINUOUS
Status: DISCONTINUED | OUTPATIENT
Start: 2019-01-01 | End: 2019-01-01

## 2019-01-01 RX ORDER — CARVEDILOL 3.12 MG/1
3.12 TABLET ORAL EVERY 12 HOURS SCHEDULED
Status: DISCONTINUED | OUTPATIENT
Start: 2019-01-01 | End: 2019-01-01 | Stop reason: HOSPADM

## 2019-01-01 RX ORDER — PROMETHAZINE HYDROCHLORIDE 6.25 MG/5ML
12.5 SYRUP ORAL EVERY 4 HOURS PRN
Status: DISCONTINUED | OUTPATIENT
Start: 2019-01-01 | End: 2019-01-01 | Stop reason: HOSPADM

## 2019-01-01 RX ORDER — ONDANSETRON 2 MG/ML
INJECTION INTRAMUSCULAR; INTRAVENOUS AS NEEDED
Status: DISCONTINUED | OUTPATIENT
Start: 2019-01-01 | End: 2019-01-01 | Stop reason: SURG

## 2019-01-01 RX ORDER — MORPHINE SULFATE 20 MG/ML
20 SOLUTION ORAL
Status: DISCONTINUED | OUTPATIENT
Start: 2019-01-01 | End: 2019-01-01 | Stop reason: HOSPADM

## 2019-01-01 RX ORDER — SODIUM CHLORIDE 9 MG/ML
1000 INJECTION, SOLUTION INTRAVENOUS CONTINUOUS
Status: DISCONTINUED | OUTPATIENT
Start: 2019-01-01 | End: 2019-01-01 | Stop reason: HOSPADM

## 2019-01-01 RX ORDER — LISINOPRIL 20 MG/1
20 TABLET ORAL DAILY
Status: DISCONTINUED | OUTPATIENT
Start: 2019-01-01 | End: 2019-01-01 | Stop reason: HOSPADM

## 2019-01-01 RX ORDER — CASTOR OIL AND BALSAM, PERU 788; 87 MG/G; MG/G
1 OINTMENT TOPICAL EVERY 12 HOURS SCHEDULED
Start: 2019-01-01 | End: 2019-01-01

## 2019-01-01 RX ORDER — POTASSIUM CHLORIDE 1.5 G/1.77G
40 POWDER, FOR SOLUTION ORAL AS NEEDED
Status: DISCONTINUED | OUTPATIENT
Start: 2019-01-01 | End: 2019-01-01

## 2019-01-01 RX ORDER — INSULIN GLARGINE 100 [IU]/ML
10 INJECTION, SOLUTION SUBCUTANEOUS NIGHTLY
Status: DISCONTINUED | OUTPATIENT
Start: 2019-01-01 | End: 2019-01-01 | Stop reason: HOSPADM

## 2019-01-01 RX ORDER — FUROSEMIDE 10 MG/ML
20 INJECTION INTRAMUSCULAR; INTRAVENOUS DAILY
Status: DISCONTINUED | OUTPATIENT
Start: 2019-01-01 | End: 2019-01-01 | Stop reason: HOSPADM

## 2019-01-01 RX ORDER — PROMETHAZINE HYDROCHLORIDE 12.5 MG/1
12.5 TABLET ORAL EVERY 4 HOURS PRN
Status: DISCONTINUED | OUTPATIENT
Start: 2019-01-01 | End: 2019-01-01 | Stop reason: HOSPADM

## 2019-01-01 RX ORDER — ACETAMINOPHEN 325 MG/1
650 TABLET ORAL ONCE AS NEEDED
Status: DISCONTINUED | OUTPATIENT
Start: 2019-01-01 | End: 2019-01-01 | Stop reason: HOSPADM

## 2019-01-01 RX ORDER — CIPROFLOXACIN 500 MG/1
500 TABLET, FILM COATED ORAL 2 TIMES DAILY
COMMUNITY
End: 2019-01-01 | Stop reason: HOSPADM

## 2019-01-01 RX ORDER — PROMETHAZINE HYDROCHLORIDE 12.5 MG/1
12.5 SUPPOSITORY RECTAL EVERY 4 HOURS PRN
Status: DISCONTINUED | OUTPATIENT
Start: 2019-01-01 | End: 2019-01-01 | Stop reason: HOSPADM

## 2019-01-01 RX ORDER — L. ACIDOPHILUS/L.BULGARICUS 1MM CELL
1 TABLET ORAL 2 TIMES DAILY
COMMUNITY
End: 2019-01-01

## 2019-01-01 RX ORDER — ROCURONIUM BROMIDE 10 MG/ML
INJECTION, SOLUTION INTRAVENOUS AS NEEDED
Status: DISCONTINUED | OUTPATIENT
Start: 2019-01-01 | End: 2019-01-01 | Stop reason: SURG

## 2019-01-01 RX ORDER — ALBUTEROL SULFATE 2.5 MG/3ML
2.5 SOLUTION RESPIRATORY (INHALATION) EVERY 4 HOURS PRN
Status: DISCONTINUED | OUTPATIENT
Start: 2019-01-01 | End: 2019-01-01 | Stop reason: HOSPADM

## 2019-01-01 RX ORDER — DIPHENOXYLATE HYDROCHLORIDE AND ATROPINE SULFATE 2.5; .025 MG/1; MG/1
1 TABLET ORAL
Status: DISCONTINUED | OUTPATIENT
Start: 2019-01-01 | End: 2019-01-01 | Stop reason: HOSPADM

## 2019-01-01 RX ORDER — SODIUM CHLORIDE 0.9 % (FLUSH) 0.9 %
3 SYRINGE (ML) INJECTION AS NEEDED
Status: DISCONTINUED | OUTPATIENT
Start: 2019-01-01 | End: 2019-01-01 | Stop reason: HOSPADM

## 2019-01-01 RX ORDER — MORPHINE SULFATE 10 MG/ML
6 INJECTION INTRAMUSCULAR; INTRAVENOUS; SUBCUTANEOUS
Status: DISCONTINUED | OUTPATIENT
Start: 2019-01-01 | End: 2019-01-01 | Stop reason: HOSPADM

## 2019-01-01 RX ORDER — GLIPIZIDE 10 MG/1
10 TABLET ORAL
Status: DISCONTINUED | OUTPATIENT
Start: 2019-01-01 | End: 2019-01-01

## 2019-01-01 RX ORDER — LORAZEPAM 2 MG/ML
0.5 INJECTION INTRAMUSCULAR
Status: DISCONTINUED | OUTPATIENT
Start: 2019-01-01 | End: 2019-01-01 | Stop reason: HOSPADM

## 2019-01-01 RX ORDER — MIDAZOLAM HYDROCHLORIDE 1 MG/ML
0.5 INJECTION INTRAMUSCULAR; INTRAVENOUS
Status: DISCONTINUED | OUTPATIENT
Start: 2019-01-01 | End: 2019-01-01 | Stop reason: HOSPADM

## 2019-01-01 RX ORDER — POTASSIUM CHLORIDE 7.45 MG/ML
10 INJECTION INTRAVENOUS
Status: DISCONTINUED | OUTPATIENT
Start: 2019-01-01 | End: 2019-01-01 | Stop reason: HOSPADM

## 2019-01-01 RX ORDER — PROMETHAZINE HYDROCHLORIDE 25 MG/1
12.5 TABLET ORAL EVERY 4 HOURS PRN
Status: DISCONTINUED | OUTPATIENT
Start: 2019-01-01 | End: 2019-01-01 | Stop reason: HOSPADM

## 2019-01-01 RX ORDER — DIPHENHYDRAMINE HYDROCHLORIDE 50 MG/ML
12.5 INJECTION INTRAMUSCULAR; INTRAVENOUS
Status: DISCONTINUED | OUTPATIENT
Start: 2019-01-01 | End: 2019-01-01 | Stop reason: HOSPADM

## 2019-01-01 RX ORDER — HALOPERIDOL 5 MG/ML
2 INJECTION INTRAMUSCULAR EVERY 4 HOURS PRN
Status: DISCONTINUED | OUTPATIENT
Start: 2019-01-01 | End: 2019-01-01 | Stop reason: HOSPADM

## 2019-01-01 RX ORDER — LORAZEPAM 2 MG/ML
1 CONCENTRATE ORAL
Status: DISCONTINUED | OUTPATIENT
Start: 2019-01-01 | End: 2019-01-01 | Stop reason: HOSPADM

## 2019-01-01 RX ORDER — DEXAMETHASONE SODIUM PHOSPHATE 4 MG/ML
6 INJECTION, SOLUTION INTRA-ARTICULAR; INTRALESIONAL; INTRAMUSCULAR; INTRAVENOUS; SOFT TISSUE EVERY 6 HOURS
Status: COMPLETED | OUTPATIENT
Start: 2019-01-01 | End: 2019-01-01

## 2019-01-01 RX ORDER — SCOLOPAMINE TRANSDERMAL SYSTEM 1 MG/1
1 PATCH, EXTENDED RELEASE TRANSDERMAL
Status: DISCONTINUED | OUTPATIENT
Start: 2019-01-01 | End: 2019-01-01 | Stop reason: HOSPADM

## 2019-01-01 RX ORDER — HYDROCODONE BITARTRATE AND ACETAMINOPHEN 7.5; 325 MG/1; MG/1
1 TABLET ORAL ONCE AS NEEDED
Status: DISCONTINUED | OUTPATIENT
Start: 2019-01-01 | End: 2019-01-01 | Stop reason: HOSPADM

## 2019-01-01 RX ADMIN — ASPIRIN 81 MG: 81 TABLET, DELAYED RELEASE ORAL at 09:10

## 2019-01-01 RX ADMIN — ROCURONIUM BROMIDE 20 MG: 10 INJECTION INTRAVENOUS at 08:26

## 2019-01-01 RX ADMIN — FENTANYL CITRATE 50 MCG: 50 INJECTION INTRAMUSCULAR; INTRAVENOUS at 21:15

## 2019-01-01 RX ADMIN — TAZOBACTAM SODIUM AND PIPERACILLIN SODIUM 3.38 G: 375; 3 INJECTION, SOLUTION INTRAVENOUS at 15:48

## 2019-01-01 RX ADMIN — METHYLPREDNISOLONE 4 MG: 4 TABLET ORAL at 17:10

## 2019-01-01 RX ADMIN — DEXAMETHASONE SODIUM PHOSPHATE 6 MG: 4 INJECTION, SOLUTION INTRA-ARTICULAR; INTRALESIONAL; INTRAMUSCULAR; INTRAVENOUS; SOFT TISSUE at 14:02

## 2019-01-01 RX ADMIN — ENOXAPARIN SODIUM 80 MG: 80 INJECTION SUBCUTANEOUS at 11:32

## 2019-01-01 RX ADMIN — CARVEDILOL 3.12 MG: 3.12 TABLET, FILM COATED ORAL at 20:24

## 2019-01-01 RX ADMIN — ENOXAPARIN SODIUM 80 MG: 80 INJECTION SUBCUTANEOUS at 22:49

## 2019-01-01 RX ADMIN — INSULIN LISPRO 3 UNITS: 100 INJECTION, SOLUTION INTRAVENOUS; SUBCUTANEOUS at 20:24

## 2019-01-01 RX ADMIN — POTASSIUM CHLORIDE, DEXTROSE MONOHYDRATE AND SODIUM CHLORIDE 75 ML/HR: 150; 5; 450 INJECTION, SOLUTION INTRAVENOUS at 00:12

## 2019-01-01 RX ADMIN — CARVEDILOL 3.12 MG: 3.12 TABLET, FILM COATED ORAL at 10:22

## 2019-01-01 RX ADMIN — SODIUM CHLORIDE, PRESERVATIVE FREE 3 ML: 5 INJECTION INTRAVENOUS at 01:15

## 2019-01-01 RX ADMIN — METOPROLOL TARTRATE 5 MG: 5 INJECTION, SOLUTION INTRAVENOUS at 20:46

## 2019-01-01 RX ADMIN — DEXMEDETOMIDINE HYDROCHLORIDE 0.3 MCG/KG/HR: 100 INJECTION, SOLUTION, CONCENTRATE INTRAVENOUS at 14:04

## 2019-01-01 RX ADMIN — METOPROLOL TARTRATE 5 MG: 5 INJECTION, SOLUTION INTRAVENOUS at 16:14

## 2019-01-01 RX ADMIN — ACETAMINOPHEN 650 MG: 325 TABLET, FILM COATED ORAL at 05:50

## 2019-01-01 RX ADMIN — CEFAZOLIN SODIUM 1 G: 1 INJECTION, SOLUTION INTRAVENOUS at 18:27

## 2019-01-01 RX ADMIN — FAMOTIDINE 20 MG: 10 INJECTION INTRAVENOUS at 12:21

## 2019-01-01 RX ADMIN — MIDAZOLAM HYDROCHLORIDE 0.5 MG: 2 INJECTION, SOLUTION INTRAMUSCULAR; INTRAVENOUS at 09:28

## 2019-01-01 RX ADMIN — IOPAMIDOL 95 ML: 755 INJECTION, SOLUTION INTRAVENOUS at 20:45

## 2019-01-01 RX ADMIN — CASTOR OIL AND BALSAM, PERU 5 G: 788; 87 OINTMENT TOPICAL at 21:23

## 2019-01-01 RX ADMIN — SODIUM CHLORIDE, PRESERVATIVE FREE 3 ML: 5 INJECTION INTRAVENOUS at 10:13

## 2019-01-01 RX ADMIN — INSULIN LISPRO 3 UNITS: 100 INJECTION, SOLUTION INTRAVENOUS; SUBCUTANEOUS at 04:02

## 2019-01-01 RX ADMIN — DEXMEDETOMIDINE HYDROCHLORIDE 0.6 MCG/KG/HR: 100 INJECTION, SOLUTION, CONCENTRATE INTRAVENOUS at 04:57

## 2019-01-01 RX ADMIN — PROPOFOL 70 MG: 10 INJECTION, EMULSION INTRAVENOUS at 08:02

## 2019-01-01 RX ADMIN — ASPIRIN 81 MG: 81 TABLET, DELAYED RELEASE ORAL at 16:33

## 2019-01-01 RX ADMIN — LISINOPRIL 20 MG: 20 TABLET ORAL at 08:40

## 2019-01-01 RX ADMIN — RACEPINEPHRINE HYDROCHLORIDE 0.5 ML: 11.25 SOLUTION RESPIRATORY (INHALATION) at 13:12

## 2019-01-01 RX ADMIN — ENOXAPARIN SODIUM 80 MG: 80 INJECTION SUBCUTANEOUS at 10:55

## 2019-01-01 RX ADMIN — CARVEDILOL 3.12 MG: 3.12 TABLET, FILM COATED ORAL at 08:40

## 2019-01-01 RX ADMIN — INSULIN HUMAN 2 UNITS: 100 INJECTION, SOLUTION PARENTERAL at 23:59

## 2019-01-01 RX ADMIN — POTASSIUM CHLORIDE, DEXTROSE MONOHYDRATE AND SODIUM CHLORIDE 75 ML/HR: 150; 5; 450 INJECTION, SOLUTION INTRAVENOUS at 10:20

## 2019-01-01 RX ADMIN — ROCURONIUM BROMIDE 20 MG: 10 INJECTION INTRAVENOUS at 07:53

## 2019-01-01 RX ADMIN — METHYLPREDNISOLONE 4 MG: 4 TABLET ORAL at 07:02

## 2019-01-01 RX ADMIN — ENOXAPARIN SODIUM 40 MG: 40 INJECTION SUBCUTANEOUS at 10:23

## 2019-01-01 RX ADMIN — FAMOTIDINE 20 MG: 10 INJECTION, SOLUTION INTRAVENOUS at 07:09

## 2019-01-01 RX ADMIN — FAMOTIDINE 20 MG: 10 INJECTION INTRAVENOUS at 20:24

## 2019-01-01 RX ADMIN — TAZOBACTAM SODIUM AND PIPERACILLIN SODIUM 3.38 G: 375; 3 INJECTION, SOLUTION INTRAVENOUS at 06:35

## 2019-01-01 RX ADMIN — ALBUTEROL SULFATE 2.5 MG: 2.5 SOLUTION RESPIRATORY (INHALATION) at 09:41

## 2019-01-01 RX ADMIN — CARVEDILOL 3.12 MG: 3.12 TABLET, FILM COATED ORAL at 09:24

## 2019-01-01 RX ADMIN — FAMOTIDINE 20 MG: 10 INJECTION INTRAVENOUS at 21:41

## 2019-01-01 RX ADMIN — CASTOR OIL AND BALSAM, PERU 5 G: 788; 87 OINTMENT TOPICAL at 08:33

## 2019-01-01 RX ADMIN — CARVEDILOL 3.12 MG: 3.12 TABLET, FILM COATED ORAL at 08:05

## 2019-01-01 RX ADMIN — INSULIN LISPRO 3 UNITS: 100 INJECTION, SOLUTION INTRAVENOUS; SUBCUTANEOUS at 05:33

## 2019-01-01 RX ADMIN — METHYLPREDNISOLONE 4 MG: 4 TABLET ORAL at 07:25

## 2019-01-01 RX ADMIN — CARVEDILOL 3.12 MG: 3.12 TABLET, FILM COATED ORAL at 09:15

## 2019-01-01 RX ADMIN — INSULIN GLARGINE 10 UNITS: 100 INJECTION, SOLUTION SUBCUTANEOUS at 23:01

## 2019-01-01 RX ADMIN — INSULIN LISPRO 8 UNITS: 100 INJECTION, SOLUTION INTRAVENOUS; SUBCUTANEOUS at 18:16

## 2019-01-01 RX ADMIN — SODIUM CHLORIDE, POTASSIUM CHLORIDE, SODIUM LACTATE AND CALCIUM CHLORIDE 9 ML/HR: 600; 310; 30; 20 INJECTION, SOLUTION INTRAVENOUS at 07:09

## 2019-01-01 RX ADMIN — FAMOTIDINE 20 MG: 10 INJECTION INTRAVENOUS at 09:19

## 2019-01-01 RX ADMIN — METHYLPREDNISOLONE 4 MG: 4 TABLET ORAL at 21:42

## 2019-01-01 RX ADMIN — BARIUM SULFATE 65 ML: 960 POWDER, FOR SUSPENSION ORAL at 09:15

## 2019-01-01 RX ADMIN — IOPAMIDOL 75 ML: 612 INJECTION, SOLUTION INTRAVENOUS at 08:39

## 2019-01-01 RX ADMIN — SODIUM CHLORIDE 125 ML/HR: 9 INJECTION, SOLUTION INTRAVENOUS at 00:52

## 2019-01-01 RX ADMIN — FAMOTIDINE 20 MG: 10 INJECTION INTRAVENOUS at 08:37

## 2019-01-01 RX ADMIN — INSULIN LISPRO 2 UNITS: 100 INJECTION, SOLUTION INTRAVENOUS; SUBCUTANEOUS at 21:42

## 2019-01-01 RX ADMIN — METHYLPREDNISOLONE 4 MG: 4 TABLET ORAL at 18:16

## 2019-01-01 RX ADMIN — METHYLPREDNISOLONE SODIUM SUCCINATE 40 MG: 40 INJECTION, POWDER, LYOPHILIZED, FOR SOLUTION INTRAMUSCULAR; INTRAVENOUS at 02:11

## 2019-01-01 RX ADMIN — TAZOBACTAM SODIUM AND PIPERACILLIN SODIUM 3.38 G: 375; 3 INJECTION, SOLUTION INTRAVENOUS at 07:32

## 2019-01-01 RX ADMIN — SUGAMMADEX 400 MG: 100 INJECTION, SOLUTION INTRAVENOUS at 08:48

## 2019-01-01 RX ADMIN — METHYLPREDNISOLONE SODIUM SUCCINATE 40 MG: 40 INJECTION, POWDER, LYOPHILIZED, FOR SOLUTION INTRAMUSCULAR; INTRAVENOUS at 02:15

## 2019-01-01 RX ADMIN — CARVEDILOL 3.12 MG: 3.12 TABLET, FILM COATED ORAL at 21:40

## 2019-01-01 RX ADMIN — INSULIN LISPRO 2 UNITS: 100 INJECTION, SOLUTION INTRAVENOUS; SUBCUTANEOUS at 18:41

## 2019-01-01 RX ADMIN — POTASSIUM CHLORIDE, DEXTROSE MONOHYDRATE AND SODIUM CHLORIDE 75 ML/HR: 150; 5; 450 INJECTION, SOLUTION INTRAVENOUS at 21:01

## 2019-01-01 RX ADMIN — INSULIN LISPRO 3 UNITS: 100 INJECTION, SOLUTION INTRAVENOUS; SUBCUTANEOUS at 17:47

## 2019-01-01 RX ADMIN — DEXMEDETOMIDINE HYDROCHLORIDE 0.2 MCG/KG/HR: 100 INJECTION, SOLUTION, CONCENTRATE INTRAVENOUS at 02:16

## 2019-01-01 RX ADMIN — POTASSIUM CHLORIDE, DEXTROSE MONOHYDRATE AND SODIUM CHLORIDE 75 ML/HR: 150; 5; 450 INJECTION, SOLUTION INTRAVENOUS at 10:08

## 2019-01-01 RX ADMIN — ENOXAPARIN SODIUM 40 MG: 40 INJECTION SUBCUTANEOUS at 09:00

## 2019-01-01 RX ADMIN — INSULIN LISPRO 6 UNITS: 100 INJECTION, SOLUTION INTRAVENOUS; SUBCUTANEOUS at 12:30

## 2019-01-01 RX ADMIN — INSULIN LISPRO 5 UNITS: 100 INJECTION, SOLUTION INTRAVENOUS; SUBCUTANEOUS at 00:57

## 2019-01-01 RX ADMIN — INSULIN LISPRO 4 UNITS: 100 INJECTION, SOLUTION INTRAVENOUS; SUBCUTANEOUS at 13:38

## 2019-01-01 RX ADMIN — SODIUM CHLORIDE 50 ML/HR: 9 INJECTION, SOLUTION INTRAVENOUS at 04:09

## 2019-01-01 RX ADMIN — SODIUM CHLORIDE, POTASSIUM CHLORIDE, SODIUM LACTATE AND CALCIUM CHLORIDE 125 ML/HR: 600; 310; 30; 20 INJECTION, SOLUTION INTRAVENOUS at 12:04

## 2019-01-01 RX ADMIN — METHYLPREDNISOLONE 4 MG: 4 TABLET ORAL at 08:05

## 2019-01-01 RX ADMIN — CEFAZOLIN SODIUM 2 G: 2 INJECTION, SOLUTION INTRAVENOUS at 08:45

## 2019-01-01 RX ADMIN — MONTELUKAST SODIUM 10 MG: 10 TABLET, FILM COATED ORAL at 22:02

## 2019-01-01 RX ADMIN — NALOXONE HYDROCHLORIDE 0.4 MG: 0.4 INJECTION, SOLUTION INTRAMUSCULAR; INTRAVENOUS; SUBCUTANEOUS at 09:05

## 2019-01-01 RX ADMIN — MONTELUKAST SODIUM 10 MG: 10 TABLET, FILM COATED ORAL at 22:49

## 2019-01-01 RX ADMIN — TAZOBACTAM SODIUM AND PIPERACILLIN SODIUM 3.38 G: 375; 3 INJECTION, SOLUTION INTRAVENOUS at 15:21

## 2019-01-01 RX ADMIN — INSULIN LISPRO 5 UNITS: 100 INJECTION, SOLUTION INTRAVENOUS; SUBCUTANEOUS at 16:41

## 2019-01-01 RX ADMIN — INSULIN LISPRO 2 UNITS: 100 INJECTION, SOLUTION INTRAVENOUS; SUBCUTANEOUS at 08:40

## 2019-01-01 RX ADMIN — ENOXAPARIN SODIUM 30 MG: 30 INJECTION SUBCUTANEOUS at 22:32

## 2019-01-01 RX ADMIN — FAMOTIDINE 20 MG: 20 TABLET, FILM COATED ORAL at 21:00

## 2019-01-01 RX ADMIN — INSULIN LISPRO 8 UNITS: 100 INJECTION, SOLUTION INTRAVENOUS; SUBCUTANEOUS at 04:31

## 2019-01-01 RX ADMIN — POTASSIUM CHLORIDE 10 MEQ: 7.46 INJECTION, SOLUTION INTRAVENOUS at 11:16

## 2019-01-01 RX ADMIN — INSULIN HUMAN 2 UNITS: 100 INJECTION, SOLUTION PARENTERAL at 07:01

## 2019-01-01 RX ADMIN — INSULIN LISPRO 3 UNITS: 100 INJECTION, SOLUTION INTRAVENOUS; SUBCUTANEOUS at 12:17

## 2019-01-01 RX ADMIN — ENOXAPARIN SODIUM 85 MG: 100 INJECTION SUBCUTANEOUS at 21:55

## 2019-01-01 RX ADMIN — CARVEDILOL 3.12 MG: 3.12 TABLET, FILM COATED ORAL at 21:41

## 2019-01-01 RX ADMIN — INSULIN LISPRO 10 UNITS: 100 INJECTION, SOLUTION INTRAVENOUS; SUBCUTANEOUS at 12:26

## 2019-01-01 RX ADMIN — METOPROLOL TARTRATE 5 MG: 5 INJECTION, SOLUTION INTRAVENOUS at 08:53

## 2019-01-01 RX ADMIN — POTASSIUM CHLORIDE 10 MEQ: 7.46 INJECTION, SOLUTION INTRAVENOUS at 16:39

## 2019-01-01 RX ADMIN — PHENYLEPHRINE HYDROCHLORIDE 100 MCG: 10 INJECTION INTRAVENOUS at 08:05

## 2019-01-01 RX ADMIN — INSULIN LISPRO 2 UNITS: 100 INJECTION, SOLUTION INTRAVENOUS; SUBCUTANEOUS at 22:00

## 2019-01-01 RX ADMIN — CARVEDILOL 3.12 MG: 3.12 TABLET, FILM COATED ORAL at 18:10

## 2019-01-01 RX ADMIN — METHYLPREDNISOLONE SODIUM SUCCINATE 40 MG: 40 INJECTION, POWDER, LYOPHILIZED, FOR SOLUTION INTRAMUSCULAR; INTRAVENOUS at 10:00

## 2019-01-01 RX ADMIN — INSULIN LISPRO 3 UNITS: 100 INJECTION, SOLUTION INTRAVENOUS; SUBCUTANEOUS at 17:52

## 2019-01-01 RX ADMIN — PROPOFOL 125 MCG/KG/MIN: 10 INJECTION, EMULSION INTRAVENOUS at 08:04

## 2019-01-01 RX ADMIN — ENOXAPARIN SODIUM 30 MG: 30 INJECTION SUBCUTANEOUS at 21:34

## 2019-01-01 RX ADMIN — CARVEDILOL 3.12 MG: 3.12 TABLET, FILM COATED ORAL at 18:40

## 2019-01-01 RX ADMIN — TAZOBACTAM SODIUM AND PIPERACILLIN SODIUM 3.38 G: 375; 3 INJECTION, SOLUTION INTRAVENOUS at 09:31

## 2019-01-01 RX ADMIN — POTASSIUM CHLORIDE 10 MEQ: 7.46 INJECTION, SOLUTION INTRAVENOUS at 14:01

## 2019-01-01 RX ADMIN — FAMOTIDINE 20 MG: 10 INJECTION INTRAVENOUS at 14:36

## 2019-01-01 RX ADMIN — FENTANYL CITRATE 50 MCG: 50 INJECTION, SOLUTION INTRAMUSCULAR; INTRAVENOUS at 01:15

## 2019-01-01 RX ADMIN — INSULIN LISPRO 3 UNITS: 100 INJECTION, SOLUTION INTRAVENOUS; SUBCUTANEOUS at 21:55

## 2019-01-01 RX ADMIN — BARIUM SULFATE 50 ML: 400 SUSPENSION ORAL at 09:15

## 2019-01-01 RX ADMIN — TAZOBACTAM SODIUM AND PIPERACILLIN SODIUM 3.38 G: 375; 3 INJECTION, SOLUTION INTRAVENOUS at 23:00

## 2019-01-01 RX ADMIN — CARVEDILOL 3.12 MG: 3.12 TABLET, FILM COATED ORAL at 17:46

## 2019-01-01 RX ADMIN — METHYLPREDNISOLONE 4 MG: 4 TABLET ORAL at 17:47

## 2019-01-01 RX ADMIN — LIDOCAINE HYDROCHLORIDE 100 MG: 20 INJECTION, SOLUTION INFILTRATION; PERINEURAL at 07:41

## 2019-01-01 RX ADMIN — TAZOBACTAM SODIUM AND PIPERACILLIN SODIUM 3.38 G: 375; 3 INJECTION, SOLUTION INTRAVENOUS at 02:18

## 2019-01-01 RX ADMIN — HYDROCODONE BITARTRATE AND ACETAMINOPHEN 10 ML: 7.5; 325 SOLUTION ORAL at 05:30

## 2019-01-01 RX ADMIN — INSULIN LISPRO 6 UNITS: 100 INJECTION, SOLUTION INTRAVENOUS; SUBCUTANEOUS at 18:10

## 2019-01-01 RX ADMIN — CEFAZOLIN SODIUM 1 G: 1 INJECTION, SOLUTION INTRAVENOUS at 02:01

## 2019-01-01 RX ADMIN — DEXAMETHASONE SODIUM PHOSPHATE 12 MG: 4 INJECTION, SOLUTION INTRAMUSCULAR; INTRAVENOUS at 10:17

## 2019-01-01 RX ADMIN — INSULIN GLARGINE 10 UNITS: 100 INJECTION, SOLUTION SUBCUTANEOUS at 21:40

## 2019-01-01 RX ADMIN — INSULIN LISPRO 8 UNITS: 100 INJECTION, SOLUTION INTRAVENOUS; SUBCUTANEOUS at 21:41

## 2019-01-01 RX ADMIN — TAZOBACTAM SODIUM AND PIPERACILLIN SODIUM 3.38 G: 375; 3 INJECTION, SOLUTION INTRAVENOUS at 16:04

## 2019-01-01 RX ADMIN — FUROSEMIDE 20 MG: 10 INJECTION, SOLUTION INTRAMUSCULAR; INTRAVENOUS at 00:02

## 2019-01-01 RX ADMIN — FAMOTIDINE 20 MG: 10 INJECTION INTRAVENOUS at 08:53

## 2019-01-01 RX ADMIN — INSULIN HUMAN 5 UNITS: 100 INJECTION, SOLUTION PARENTERAL at 00:50

## 2019-01-01 RX ADMIN — FENTANYL CITRATE 50 MCG: 50 INJECTION INTRAMUSCULAR; INTRAVENOUS at 14:33

## 2019-01-01 RX ADMIN — METHYLPREDNISOLONE SODIUM SUCCINATE 40 MG: 40 INJECTION, POWDER, FOR SOLUTION INTRAMUSCULAR; INTRAVENOUS at 08:53

## 2019-01-01 RX ADMIN — DEXAMETHASONE SODIUM PHOSPHATE 12 MG: 4 INJECTION, SOLUTION INTRAMUSCULAR; INTRAVENOUS at 10:03

## 2019-01-01 RX ADMIN — CARVEDILOL 3.12 MG: 3.12 TABLET, FILM COATED ORAL at 08:33

## 2019-01-01 RX ADMIN — METHYLPREDNISOLONE SODIUM SUCCINATE 40 MG: 40 INJECTION, POWDER, LYOPHILIZED, FOR SOLUTION INTRAMUSCULAR; INTRAVENOUS at 17:59

## 2019-01-01 RX ADMIN — ENOXAPARIN SODIUM 40 MG: 40 INJECTION SUBCUTANEOUS at 08:53

## 2019-01-01 RX ADMIN — CARVEDILOL 3.12 MG: 3.12 TABLET, FILM COATED ORAL at 10:13

## 2019-01-01 RX ADMIN — FENTANYL CITRATE 50 MCG: 50 INJECTION INTRAMUSCULAR; INTRAVENOUS at 06:38

## 2019-01-01 RX ADMIN — METHYLPREDNISOLONE 4 MG: 4 TABLET ORAL at 12:26

## 2019-01-01 RX ADMIN — TAZOBACTAM SODIUM AND PIPERACILLIN SODIUM 3.38 G: 375; 3 INJECTION, SOLUTION INTRAVENOUS at 14:04

## 2019-01-01 RX ADMIN — FENTANYL CITRATE 25 MCG: 50 INJECTION INTRAMUSCULAR; INTRAVENOUS at 09:50

## 2019-01-01 RX ADMIN — SODIUM CHLORIDE, PRESERVATIVE FREE 3 ML: 5 INJECTION INTRAVENOUS at 08:40

## 2019-01-01 RX ADMIN — INSULIN LISPRO 3 UNITS: 100 INJECTION, SOLUTION INTRAVENOUS; SUBCUTANEOUS at 12:21

## 2019-01-01 RX ADMIN — BARIUM SULFATE 4 ML: 980 POWDER, FOR SUSPENSION ORAL at 09:15

## 2019-01-01 RX ADMIN — TAZOBACTAM SODIUM AND PIPERACILLIN SODIUM 3.38 G: 375; 3 INJECTION, SOLUTION INTRAVENOUS at 16:03

## 2019-01-01 RX ADMIN — POTASSIUM CHLORIDE 10 MEQ: 7.46 INJECTION, SOLUTION INTRAVENOUS at 19:58

## 2019-01-01 RX ADMIN — SODIUM CHLORIDE, PRESERVATIVE FREE 3 ML: 5 INJECTION INTRAVENOUS at 22:32

## 2019-01-01 RX ADMIN — ASPIRIN 81 MG: 81 TABLET, DELAYED RELEASE ORAL at 09:00

## 2019-01-01 RX ADMIN — CARVEDILOL 3.12 MG: 3.12 TABLET, FILM COATED ORAL at 09:00

## 2019-01-01 RX ADMIN — FAMOTIDINE 20 MG: 10 INJECTION INTRAVENOUS at 10:24

## 2019-01-01 RX ADMIN — DEXAMETHASONE SODIUM PHOSPHATE 6 MG: 4 INJECTION, SOLUTION INTRA-ARTICULAR; INTRALESIONAL; INTRAMUSCULAR; INTRAVENOUS; SOFT TISSUE at 21:00

## 2019-01-01 RX ADMIN — CASTOR OIL AND BALSAM, PERU 5 G: 788; 87 OINTMENT TOPICAL at 22:56

## 2019-01-01 RX ADMIN — INSULIN LISPRO 2 UNITS: 100 INJECTION, SOLUTION INTRAVENOUS; SUBCUTANEOUS at 17:46

## 2019-01-01 RX ADMIN — METHYLPREDNISOLONE 4 MG: 4 TABLET ORAL at 13:05

## 2019-01-01 RX ADMIN — TAZOBACTAM SODIUM AND PIPERACILLIN SODIUM 3.38 G: 375; 3 INJECTION, SOLUTION INTRAVENOUS at 23:48

## 2019-01-01 RX ADMIN — GLYCOPYRROLATE 0.3 MG: 0.2 INJECTION INTRAMUSCULAR; INTRAVENOUS at 07:39

## 2019-01-01 RX ADMIN — LORAZEPAM 0.5 MG: 2 INJECTION INTRAMUSCULAR; INTRAVENOUS at 06:31

## 2019-01-01 RX ADMIN — METOPROLOL TARTRATE 5 MG: 5 INJECTION, SOLUTION INTRAVENOUS at 21:41

## 2019-01-01 RX ADMIN — FUROSEMIDE 20 MG: 10 INJECTION, SOLUTION INTRAMUSCULAR; INTRAVENOUS at 09:30

## 2019-01-01 RX ADMIN — PROPOFOL 50 MCG/KG/MIN: 10 INJECTION, EMULSION INTRAVENOUS at 09:40

## 2019-01-01 RX ADMIN — ENOXAPARIN SODIUM 30 MG: 30 INJECTION SUBCUTANEOUS at 20:35

## 2019-01-01 RX ADMIN — CASTOR OIL AND BALSAM, PERU 5 G: 788; 87 OINTMENT TOPICAL at 08:40

## 2019-01-01 RX ADMIN — SODIUM CHLORIDE, PRESERVATIVE FREE 3 ML: 5 INJECTION INTRAVENOUS at 23:13

## 2019-01-01 RX ADMIN — METHYLPREDNISOLONE 4 MG: 4 TABLET ORAL at 21:41

## 2019-01-01 RX ADMIN — METHYLPREDNISOLONE SODIUM SUCCINATE 40 MG: 40 INJECTION, POWDER, LYOPHILIZED, FOR SOLUTION INTRAMUSCULAR; INTRAVENOUS at 02:30

## 2019-01-01 RX ADMIN — ENOXAPARIN SODIUM 80 MG: 80 INJECTION SUBCUTANEOUS at 21:24

## 2019-01-01 RX ADMIN — LIDOCAINE HYDROCHLORIDE 60 MG: 20 INJECTION, SOLUTION INFILTRATION; PERINEURAL at 08:04

## 2019-01-01 RX ADMIN — LISINOPRIL 20 MG: 20 TABLET ORAL at 09:24

## 2019-01-01 RX ADMIN — METHYLPREDNISOLONE 8 MG: 4 TABLET ORAL at 21:42

## 2019-01-01 RX ADMIN — MIDAZOLAM HYDROCHLORIDE 2 MG: 2 INJECTION, SOLUTION INTRAMUSCULAR; INTRAVENOUS at 01:15

## 2019-01-01 RX ADMIN — DEXAMETHASONE SODIUM PHOSPHATE 8 MG: 10 INJECTION INTRAMUSCULAR; INTRAVENOUS at 07:51

## 2019-01-01 RX ADMIN — MIDAZOLAM HYDROCHLORIDE 2 MG: 2 INJECTION, SOLUTION INTRAMUSCULAR; INTRAVENOUS at 01:49

## 2019-01-01 RX ADMIN — CARVEDILOL 3.12 MG: 3.12 TABLET, FILM COATED ORAL at 09:10

## 2019-01-01 RX ADMIN — DEXMEDETOMIDINE HYDROCHLORIDE 0.5 MCG/KG/HR: 100 INJECTION, SOLUTION, CONCENTRATE INTRAVENOUS at 02:55

## 2019-01-01 RX ADMIN — POTASSIUM CHLORIDE, DEXTROSE MONOHYDRATE AND SODIUM CHLORIDE 75 ML/HR: 150; 5; 450 INJECTION, SOLUTION INTRAVENOUS at 10:12

## 2019-01-01 RX ADMIN — DEXAMETHASONE SODIUM PHOSPHATE 6 MG: 4 INJECTION, SOLUTION INTRA-ARTICULAR; INTRALESIONAL; INTRAMUSCULAR; INTRAVENOUS; SOFT TISSUE at 02:11

## 2019-01-01 RX ADMIN — TAZOBACTAM SODIUM AND PIPERACILLIN SODIUM 3.38 G: 375; 3 INJECTION, SOLUTION INTRAVENOUS at 06:37

## 2019-01-01 RX ADMIN — PHENYLEPHRINE HYDROCHLORIDE 100 MCG: 10 INJECTION INTRAVENOUS at 08:01

## 2019-01-01 RX ADMIN — INSULIN LISPRO 3 UNITS: 100 INJECTION, SOLUTION INTRAVENOUS; SUBCUTANEOUS at 00:59

## 2019-01-01 RX ADMIN — INSULIN LISPRO 3 UNITS: 100 INJECTION, SOLUTION INTRAVENOUS; SUBCUTANEOUS at 12:25

## 2019-01-01 RX ADMIN — CARVEDILOL 3.12 MG: 3.12 TABLET, FILM COATED ORAL at 19:47

## 2019-01-01 RX ADMIN — METHYLPREDNISOLONE 4 MG: 4 TABLET ORAL at 09:15

## 2019-01-01 RX ADMIN — METHYLPREDNISOLONE 4 MG: 4 TABLET ORAL at 21:40

## 2019-01-01 RX ADMIN — TAZOBACTAM SODIUM AND PIPERACILLIN SODIUM 3.38 G: 375; 3 INJECTION, SOLUTION INTRAVENOUS at 06:32

## 2019-01-01 RX ADMIN — TAZOBACTAM SODIUM AND PIPERACILLIN SODIUM 3.38 G: 375; 3 INJECTION, SOLUTION INTRAVENOUS at 23:11

## 2019-01-01 RX ADMIN — FAMOTIDINE 20 MG: 10 INJECTION INTRAVENOUS at 22:40

## 2019-01-01 RX ADMIN — IPRATROPIUM BROMIDE AND ALBUTEROL SULFATE 3 ML: 2.5; .5 SOLUTION RESPIRATORY (INHALATION) at 03:43

## 2019-01-01 RX ADMIN — FENTANYL CITRATE 100 MCG: 50 INJECTION INTRAMUSCULAR; INTRAVENOUS at 07:34

## 2019-01-01 RX ADMIN — INSULIN LISPRO 8 UNITS: 100 INJECTION, SOLUTION INTRAVENOUS; SUBCUTANEOUS at 23:01

## 2019-01-01 RX ADMIN — ASPIRIN 81 MG: 81 TABLET, DELAYED RELEASE ORAL at 08:12

## 2019-01-01 RX ADMIN — SODIUM CHLORIDE 50 ML/HR: 9 INJECTION, SOLUTION INTRAVENOUS at 09:36

## 2019-01-01 RX ADMIN — MONTELUKAST SODIUM 10 MG: 10 TABLET, FILM COATED ORAL at 21:24

## 2019-01-01 RX ADMIN — ASPIRIN 81 MG: 81 TABLET, DELAYED RELEASE ORAL at 09:15

## 2019-01-01 RX ADMIN — ROCURONIUM BROMIDE 40 MG: 10 INJECTION INTRAVENOUS at 07:41

## 2019-01-01 RX ADMIN — ENOXAPARIN SODIUM 40 MG: 40 INJECTION SUBCUTANEOUS at 09:14

## 2019-01-01 RX ADMIN — INSULIN LISPRO 3 UNITS: 100 INJECTION, SOLUTION INTRAVENOUS; SUBCUTANEOUS at 20:41

## 2019-01-01 RX ADMIN — TAZOBACTAM SODIUM AND PIPERACILLIN SODIUM 3.38 G: 375; 3 INJECTION, SOLUTION INTRAVENOUS at 06:21

## 2019-01-01 RX ADMIN — INSULIN LISPRO 8 UNITS: 100 INJECTION, SOLUTION INTRAVENOUS; SUBCUTANEOUS at 11:57

## 2019-01-01 RX ADMIN — SODIUM CHLORIDE, PRESERVATIVE FREE 3 ML: 5 INJECTION INTRAVENOUS at 21:00

## 2019-01-01 RX ADMIN — ROCURONIUM BROMIDE 20 MG: 10 INJECTION INTRAVENOUS at 08:10

## 2019-01-01 RX ADMIN — SODIUM CHLORIDE 50 ML/HR: 4.5 INJECTION, SOLUTION INTRAVENOUS at 16:40

## 2019-01-01 RX ADMIN — ENOXAPARIN SODIUM 30 MG: 30 INJECTION SUBCUTANEOUS at 09:30

## 2019-01-01 RX ADMIN — INSULIN LISPRO 3 UNITS: 100 INJECTION, SOLUTION INTRAVENOUS; SUBCUTANEOUS at 09:14

## 2019-01-01 RX ADMIN — INSULIN LISPRO 2 UNITS: 100 INJECTION, SOLUTION INTRAVENOUS; SUBCUTANEOUS at 09:03

## 2019-01-01 RX ADMIN — SODIUM CHLORIDE, POTASSIUM CHLORIDE, SODIUM LACTATE AND CALCIUM CHLORIDE: 600; 310; 30; 20 INJECTION, SOLUTION INTRAVENOUS at 08:30

## 2019-01-01 RX ADMIN — FUROSEMIDE 40 MG: 10 INJECTION, SOLUTION INTRAMUSCULAR; INTRAVENOUS at 07:46

## 2019-01-01 RX ADMIN — SODIUM CHLORIDE, PRESERVATIVE FREE 3 ML: 5 INJECTION INTRAVENOUS at 22:03

## 2019-01-01 RX ADMIN — SODIUM CHLORIDE, POTASSIUM CHLORIDE, SODIUM LACTATE AND CALCIUM CHLORIDE: 600; 310; 30; 20 INJECTION, SOLUTION INTRAVENOUS at 07:30

## 2019-01-01 RX ADMIN — METOPROLOL TARTRATE 5 MG: 5 INJECTION, SOLUTION INTRAVENOUS at 09:30

## 2019-01-01 RX ADMIN — INSULIN HUMAN 2 UNITS: 100 INJECTION, SOLUTION PARENTERAL at 06:41

## 2019-01-01 RX ADMIN — INSULIN HUMAN 2 UNITS: 100 INJECTION, SOLUTION PARENTERAL at 19:16

## 2019-01-01 RX ADMIN — TAZOBACTAM SODIUM AND PIPERACILLIN SODIUM 3.38 G: 375; 3 INJECTION, SOLUTION INTRAVENOUS at 07:46

## 2019-01-01 RX ADMIN — INSULIN LISPRO 3 UNITS: 100 INJECTION, SOLUTION INTRAVENOUS; SUBCUTANEOUS at 21:40

## 2019-01-01 RX ADMIN — INSULIN LISPRO 8 UNITS: 100 INJECTION, SOLUTION INTRAVENOUS; SUBCUTANEOUS at 17:09

## 2019-01-01 RX ADMIN — INSULIN LISPRO 8 UNITS: 100 INJECTION, SOLUTION INTRAVENOUS; SUBCUTANEOUS at 08:12

## 2019-01-01 RX ADMIN — TAZOBACTAM SODIUM AND PIPERACILLIN SODIUM 3.38 G: 375; 3 INJECTION, SOLUTION INTRAVENOUS at 23:49

## 2019-01-01 RX ADMIN — TAZOBACTAM SODIUM AND PIPERACILLIN SODIUM 3.38 G: 375; 3 INJECTION, SOLUTION INTRAVENOUS at 14:36

## 2019-01-01 RX ADMIN — ENOXAPARIN SODIUM 80 MG: 80 INJECTION SUBCUTANEOUS at 10:13

## 2019-01-01 RX ADMIN — ENOXAPARIN SODIUM 30 MG: 30 INJECTION SUBCUTANEOUS at 10:07

## 2019-01-01 RX ADMIN — METOPROLOL TARTRATE 5 MG: 5 INJECTION, SOLUTION INTRAVENOUS at 04:02

## 2019-01-01 RX ADMIN — INSULIN LISPRO 4 UNITS: 100 INJECTION, SOLUTION INTRAVENOUS; SUBCUTANEOUS at 22:48

## 2019-01-01 RX ADMIN — INSULIN GLARGINE 10 UNITS: 100 INJECTION, SOLUTION SUBCUTANEOUS at 21:57

## 2019-01-01 RX ADMIN — POTASSIUM CHLORIDE 10 MEQ: 7.46 INJECTION, SOLUTION INTRAVENOUS at 22:40

## 2019-01-01 RX ADMIN — TAZOBACTAM SODIUM AND PIPERACILLIN SODIUM 3.38 G: 375; 3 INJECTION, SOLUTION INTRAVENOUS at 16:39

## 2019-01-01 RX ADMIN — DEXMEDETOMIDINE HYDROCHLORIDE 0.6 MCG/KG/HR: 100 INJECTION, SOLUTION, CONCENTRATE INTRAVENOUS at 18:51

## 2019-01-01 RX ADMIN — INSULIN LISPRO 5 UNITS: 100 INJECTION, SOLUTION INTRAVENOUS; SUBCUTANEOUS at 10:23

## 2019-01-01 RX ADMIN — ONDANSETRON 4 MG: 2 INJECTION INTRAMUSCULAR; INTRAVENOUS at 08:45

## 2019-01-01 RX ADMIN — ENOXAPARIN SODIUM 40 MG: 40 INJECTION SUBCUTANEOUS at 09:48

## 2019-01-01 RX ADMIN — DEXMEDETOMIDINE HYDROCHLORIDE 400 MCG: 100 INJECTION, SOLUTION, CONCENTRATE INTRAVENOUS at 01:51

## 2019-01-01 RX ADMIN — CARVEDILOL 3.12 MG: 3.12 TABLET, FILM COATED ORAL at 08:12

## 2019-01-01 RX ADMIN — INSULIN LISPRO 2 UNITS: 100 INJECTION, SOLUTION INTRAVENOUS; SUBCUTANEOUS at 03:30

## 2019-01-01 RX ADMIN — ENOXAPARIN SODIUM 40 MG: 40 INJECTION SUBCUTANEOUS at 07:46

## 2019-01-01 RX ADMIN — METHYLPREDNISOLONE SODIUM SUCCINATE 40 MG: 40 INJECTION, POWDER, LYOPHILIZED, FOR SOLUTION INTRAMUSCULAR; INTRAVENOUS at 09:45

## 2019-01-01 RX ADMIN — LIDOCAINE HYDROCHLORIDE 100 MG: 20 INJECTION, SOLUTION INFILTRATION; PERINEURAL at 09:42

## 2019-01-01 RX ADMIN — FAMOTIDINE 20 MG: 10 INJECTION INTRAVENOUS at 21:00

## 2019-01-01 RX ADMIN — FENTANYL CITRATE 25 MCG: 50 INJECTION INTRAMUSCULAR; INTRAVENOUS at 09:46

## 2019-01-01 RX ADMIN — SODIUM CHLORIDE, PRESERVATIVE FREE 3 ML: 5 INJECTION INTRAVENOUS at 10:08

## 2019-01-01 RX ADMIN — CASTOR OIL AND BALSAM, PERU 5 G: 788; 87 OINTMENT TOPICAL at 10:13

## 2019-01-01 RX ADMIN — SODIUM CHLORIDE, POTASSIUM CHLORIDE, SODIUM LACTATE AND CALCIUM CHLORIDE 9 ML/HR: 600; 310; 30; 20 INJECTION, SOLUTION INTRAVENOUS at 09:27

## 2019-01-01 RX ADMIN — METHYLPREDNISOLONE 8 MG: 4 TABLET ORAL at 20:24

## 2019-01-01 RX ADMIN — CARVEDILOL 3.12 MG: 3.12 TABLET, FILM COATED ORAL at 20:31

## 2019-01-01 RX ADMIN — INSULIN LISPRO 4 UNITS: 100 INJECTION, SOLUTION INTRAVENOUS; SUBCUTANEOUS at 08:33

## 2019-01-01 RX ADMIN — PROPOFOL 200 MG: 10 INJECTION, EMULSION INTRAVENOUS at 07:41

## 2019-01-01 RX ADMIN — FENTANYL CITRATE 50 MCG: 50 INJECTION INTRAMUSCULAR; INTRAVENOUS at 22:35

## 2019-01-01 RX ADMIN — TAZOBACTAM SODIUM AND PIPERACILLIN SODIUM 3.38 G: 375; 3 INJECTION, SOLUTION INTRAVENOUS at 23:35

## 2019-01-01 RX ADMIN — METOPROLOL TARTRATE 5 MG: 5 INJECTION, SOLUTION INTRAVENOUS at 16:01

## 2019-01-01 RX ADMIN — POTASSIUM CHLORIDE, DEXTROSE MONOHYDRATE AND SODIUM CHLORIDE 75 ML/HR: 150; 5; 450 INJECTION, SOLUTION INTRAVENOUS at 19:52

## 2019-01-01 RX ADMIN — FENTANYL CITRATE 50 MCG: 50 INJECTION INTRAMUSCULAR; INTRAVENOUS at 09:40

## 2019-01-01 RX ADMIN — FAMOTIDINE 20 MG: 10 INJECTION INTRAVENOUS at 20:31

## 2019-01-01 RX ADMIN — METHYLPREDNISOLONE 4 MG: 4 TABLET ORAL at 19:46

## 2019-01-01 RX ADMIN — LISINOPRIL 20 MG: 20 TABLET ORAL at 10:13

## 2019-01-01 RX ADMIN — POTASSIUM CHLORIDE 10 MEQ: 7.46 INJECTION, SOLUTION INTRAVENOUS at 07:50

## 2019-01-01 RX ADMIN — METHYLPREDNISOLONE SODIUM SUCCINATE 20 MG: 40 INJECTION, POWDER, FOR SOLUTION INTRAMUSCULAR; INTRAVENOUS at 10:23

## 2019-01-01 RX ADMIN — INSULIN LISPRO 5 UNITS: 100 INJECTION, SOLUTION INTRAVENOUS; SUBCUTANEOUS at 12:27

## 2019-01-01 RX ADMIN — METHYLPREDNISOLONE SODIUM SUCCINATE 40 MG: 40 INJECTION, POWDER, FOR SOLUTION INTRAMUSCULAR; INTRAVENOUS at 08:54

## 2019-01-01 RX ADMIN — FAMOTIDINE 20 MG: 10 INJECTION INTRAVENOUS at 20:46

## 2019-01-01 RX ADMIN — FAMOTIDINE 20 MG: 10 INJECTION INTRAVENOUS at 08:12

## 2019-01-01 RX ADMIN — FUROSEMIDE 20 MG: 10 INJECTION, SOLUTION INTRAMUSCULAR; INTRAVENOUS at 09:16

## 2019-01-01 RX ADMIN — FAMOTIDINE 20 MG: 10 INJECTION INTRAVENOUS at 09:04

## 2019-01-01 RX ADMIN — SODIUM CHLORIDE, PRESERVATIVE FREE 3 ML: 5 INJECTION INTRAVENOUS at 09:25

## 2019-01-01 RX ADMIN — MORPHINE SULFATE 2 MG: 2 INJECTION, SOLUTION INTRAMUSCULAR; INTRAVENOUS at 06:31

## 2019-01-01 RX ADMIN — ENOXAPARIN SODIUM 40 MG: 40 INJECTION SUBCUTANEOUS at 08:16

## 2019-01-01 RX ADMIN — TAZOBACTAM SODIUM AND PIPERACILLIN SODIUM 3.38 G: 375; 3 INJECTION, SOLUTION INTRAVENOUS at 07:49

## 2019-01-01 RX ADMIN — SODIUM CHLORIDE, PRESERVATIVE FREE 3 ML: 5 INJECTION INTRAVENOUS at 09:30

## 2019-01-01 RX ADMIN — SODIUM CHLORIDE, PRESERVATIVE FREE 3 ML: 5 INJECTION INTRAVENOUS at 09:16

## 2019-01-01 RX ADMIN — TAZOBACTAM SODIUM AND PIPERACILLIN SODIUM 3.38 G: 375; 3 INJECTION, SOLUTION INTRAVENOUS at 22:11

## 2019-01-01 RX ADMIN — METOPROLOL TARTRATE 5 MG: 5 INJECTION, SOLUTION INTRAVENOUS at 09:36

## 2019-01-01 RX ADMIN — ALBUTEROL SULFATE 2.5 MG: 2.5 SOLUTION RESPIRATORY (INHALATION) at 12:55

## 2019-01-01 RX ADMIN — HYDROCODONE BITARTRATE AND ACETAMINOPHEN 10 ML: 7.5; 325 SOLUTION ORAL at 15:42

## 2019-01-01 RX ADMIN — METHYLPREDNISOLONE 8 MG: 4 TABLET ORAL at 21:43

## 2019-01-01 RX ADMIN — LISINOPRIL 20 MG: 20 TABLET ORAL at 08:33

## 2019-01-01 RX ADMIN — TAZOBACTAM SODIUM AND PIPERACILLIN SODIUM 3.38 G: 375; 3 INJECTION, SOLUTION INTRAVENOUS at 22:23

## 2019-01-01 RX ADMIN — BARIUM SULFATE 50 ML: 400 SUSPENSION ORAL at 09:16

## 2019-01-01 RX ADMIN — FENTANYL CITRATE 50 MCG: 50 INJECTION INTRAMUSCULAR; INTRAVENOUS at 06:28

## 2019-01-01 RX ADMIN — ASPIRIN 81 MG: 81 TABLET, DELAYED RELEASE ORAL at 10:22

## 2019-01-01 RX ADMIN — METHYLPREDNISOLONE 4 MG: 4 TABLET ORAL at 12:27

## 2019-01-01 RX ADMIN — INSULIN LISPRO 3 UNITS: 100 INJECTION, SOLUTION INTRAVENOUS; SUBCUTANEOUS at 12:59

## 2019-01-01 RX ADMIN — ENOXAPARIN SODIUM 80 MG: 80 INJECTION SUBCUTANEOUS at 22:02

## 2019-01-01 RX ADMIN — ENOXAPARIN SODIUM 30 MG: 30 INJECTION SUBCUTANEOUS at 09:16

## 2019-01-01 RX ADMIN — ASPIRIN 81 MG: 81 TABLET, DELAYED RELEASE ORAL at 08:05

## 2019-01-01 RX ADMIN — PHENYLEPHRINE HYDROCHLORIDE 100 MCG: 10 INJECTION INTRAVENOUS at 07:57

## 2019-01-01 RX ADMIN — METHYLPREDNISOLONE SODIUM SUCCINATE 40 MG: 40 INJECTION, POWDER, LYOPHILIZED, FOR SOLUTION INTRAMUSCULAR; INTRAVENOUS at 17:30

## 2019-01-01 RX ADMIN — METHYLPREDNISOLONE SODIUM SUCCINATE 40 MG: 40 INJECTION, POWDER, LYOPHILIZED, FOR SOLUTION INTRAMUSCULAR; INTRAVENOUS at 09:37

## 2019-01-01 RX ADMIN — FUROSEMIDE 20 MG: 10 INJECTION, SOLUTION INTRAMUSCULAR; INTRAVENOUS at 10:08

## 2019-01-01 RX ADMIN — CASTOR OIL AND BALSAM, PERU 5 G: 788; 87 OINTMENT TOPICAL at 22:02

## 2019-01-01 RX ADMIN — METHYLPREDNISOLONE 4 MG: 4 TABLET ORAL at 07:15

## 2019-01-01 RX ADMIN — FENTANYL CITRATE 50 MCG: 50 INJECTION INTRAMUSCULAR; INTRAVENOUS at 16:07

## 2019-01-25 NOTE — DISCHARGE INSTRUCTIONS
Take the following medications the morning of surgery with a small sip of water:  CARVEDILOL AND INHALERS    ARRIVAL TIME 0530 TO MAIN OR         General Instructions:  • Do not eat solid food after midnight the night before surgery.  • You may drink clear liquids day of surgery but must stop at least one hour before your hospital arrival time (CUTOFF TIME 0430 AM)  • It is beneficial for you to have a clear drink that contains carbohydrates the day of surgery.  We suggest a 12 to 20 ounce bottle of Gatorade or Powerade for non-diabetic patients or a 12 to 20 ounce bottle of G2 or Powerade Zero for diabetic patients. (Pediatric patients, are not advised to drink a 12 to 20 ounce carbohydrate drink)    Clear liquids are liquids you can see through.  Nothing red in color.     Plain water                               Sports drinks  Sodas                                   Gelatin (Jell-O)  Fruit juices without pulp such as white grape juice and apple juice  Popsicles that contain no fruit or yogurt  Tea or coffee (no cream or milk added)  Gatorade / Powerade  G2 / Powerade Zero    • Infants may have breast milk up to four hours before surgery.  • Infants drinking formula may drink formula up to six hours before surgery.   • Patients who avoid smoking, chewing tobacco and alcohol for 4 weeks prior to surgery have a reduced risk of post-operative complications.  Quit smoking as many days before surgery as you can.  • Do not smoke, use chewing tobacco or drink alcohol the day of surgery.   • If applicable bring your C-PAP/ BI-PAP machine.  • Bring any papers given to you in the doctor’s office.  • Wear clean comfortable clothes and socks.  • Do not wear contact lenses or make-up.  Bring a case for your glasses.   • Bring crutches or walker if applicable.  • Remove all piercings.  Leave jewelry and any other valuables at home.  • Hair extensions with metal clips must be removed prior to surgery.  • The Pre-Admission  Testing nurse will instruct you to bring medications if unable to obtain an accurate list in Pre-Admission Testing.            Preventing a Surgical Site Infection:  • For 2 to 3 days before surgery, avoid shaving with a razor because the razor can irritate skin and make it easier to develop an infection.    • Any areas of open skin can increase the risk of a post-operative wound infection by allowing bacteria to enter and travel throughout the body.  Notify your surgeon if you have any skin wounds / rashes even if it is not near the expected surgical site.  The area will need assessed to determine if surgery should be delayed until it is healed.  • The night prior to surgery sleep in a clean bed with clean clothing.  Do not allow pets to sleep with you.  • Shower on the morning of surgery using a fresh bar of anti-bacterial soap (such as Dial) and clean washcloth.  Dry with a clean towel and dress in clean clothing.  • Ask your surgeon if you will be receiving antibiotics prior to surgery.  • Make sure you, your family, and all healthcare providers clean their hands with soap and water or an alcohol based hand  before caring for you or your wound.    Day of surgery:  Upon arrival, a Pre-op nurse and Anesthesiologist will review your health history, obtain vital signs, and answer questions you may have.  The only belongings needed at this time will be your home medications and if applicable your C-PAP/BI-PAP machine.  If you are staying overnight your family can leave the rest of your belongings in the car and bring them to your room later.  A Pre-op nurse will start an IV and you may receive medication in preparation for surgery, including something to help you relax.  Your family will be able to see you in the Pre-op area.  While you are in surgery your family should notify the waiting room  if they leave the waiting room area and provide a contact phone number.    Please be aware that surgery  does come with discomfort.  We want to make every effort to control your discomfort so please discuss any uncontrolled symptoms with your nurse.   Your doctor will most likely have prescribed pain medications.      If you are going home after surgery you will receive individualized written care instructions before being discharged.  A responsible adult must drive you to and from the hospital on the day of your surgery and stay with you for 24 hours.    If you are staying overnight following surgery, you will be transported to your hospital room following the recovery period.  [facility] has all private rooms.    You have received a list of surgical assistants for your reference.  If you have any questions please call Pre-Admission Testing at [phone].  Deductibles and co-payments are collected on the day of service. Please be prepared to pay the required co-pay, deductible or deposit on the day of service as defined by your plan.

## 2019-01-29 PROBLEM — C32.9 LARYNGEAL CANCER (HCC): Status: ACTIVE | Noted: 2019-01-01

## 2019-01-29 NOTE — ANESTHESIA POSTPROCEDURE EVALUATION
"Patient: Rosalio Rene    Procedure Summary     Date:  01/29/19 Room / Location:  [facility] OR [address] [place] [address] MAIN OR    Anesthesia Start:  0730 Anesthesia Stop:  0918    Procedure:  DIRECT SUSPENSION MICROLARYNGOSCOPY BIOPSY AND POSSIBLE CERVICE ESOPHAGOSCOPY (N/A ) Diagnosis:      Surgeon:  Michael Bhagat MD Provider:  Alan Lewis MD    Anesthesia Type:  general ASA Status:  2          Anesthesia Type: general  Last vitals  BP   133/69 (01/29/19 1110)   Temp   (P) 36.7 °C (98 °F) (01/29/19 1110)   Pulse   94 (01/29/19 1110)   Resp   20 (01/29/19 1055)     SpO2   99 % (01/29/19 1110)     Post Anesthesia Care and Evaluation    Patient location during evaluation: bedside  Patient participation: complete - patient participated  Level of consciousness: awake and alert  Pain management: adequate  Airway patency: patent  Anesthetic complications: No anesthetic complications    Cardiovascular status: acceptable  Respiratory status: acceptable  Hydration status: acceptable    Comments: /69   Pulse 94   Temp (P) 36.7 °C (98 °F) (Oral)   Resp 20   Ht 180.3 cm (71\")   Wt 89.5 kg (197 lb 5 oz)   SpO2 99%   BMI 27.52 kg/m²       "

## 2019-01-29 NOTE — ANESTHESIA PREPROCEDURE EVALUATION
Anesthesia Evaluation     Patient summary reviewed and Nursing notes reviewed   NPO Solid Status: > 8 hours  NPO Liquid Status: > 8 hours           Airway   Mallampati: II  TM distance: >3 FB  Neck ROM: full  no difficulty expected  Dental - normal exam     Pulmonary - normal exam   (+) COPD,   Cardiovascular - normal exam    (+) hypertension, hyperlipidemia,       Neuro/Psych  (+) psychiatric history,     GI/Hepatic/Renal/Endo    (+)   diabetes mellitus type 2,     Musculoskeletal     Abdominal  - normal exam   Substance History      OB/GYN          Other      history of cancer                    Anesthesia Plan    ASA 2     general     intravenous induction   Anesthetic plan, all risks, benefits, and alternatives have been provided, discussed and informed consent has been obtained with: patient.

## 2019-01-29 NOTE — ANESTHESIA PROCEDURE NOTES
Airway  Urgency: elective    Date/Time: 1/29/2019 7:44 AM  Airway not difficult    General Information and Staff    Patient location during procedure: OR  Anesthesiologist: Alan Lewis MD  CRNA: Shavon Pineda CRNA    Indications and Patient Condition  Indications for airway management: airway protection    Preoxygenated: yes  MILS not maintained throughout  Mask difficulty assessment: 1 - vent by mask    Final Airway Details  Final airway type: endotracheal airway      Successful airway: ETT  Cuffed: yes   Successful intubation technique: direct laryngoscopy  Endotracheal tube insertion site: oral  Blade: Mayte  Blade size: 3  ETT size (mm): 6.0  Cormack-Lehane Classification: grade I - full view of glottis  Placement verified by: chest auscultation and capnometry   Cuff volume (mL): 8  Measured from: lips  ETT to lips (cm): 23  Number of attempts at approach: 1    Additional Comments  Pt preoxygenated prior to induction, easy mask airway, atraumatic intubation,+ ETCO2, + bs bilat,  ETT secured and connected to ventilator.

## 2019-01-30 PROBLEM — J96.02 ACUTE RESPIRATORY FAILURE WITH HYPERCAPNIA (HCC): Status: ACTIVE | Noted: 2019-01-01

## 2019-02-01 PROBLEM — R13.12 OROPHARYNGEAL DYSPHAGIA: Status: ACTIVE | Noted: 2019-01-01

## 2019-02-01 NOTE — PROGRESS NOTES
[facility]      Mar/chart reviewed  Follow up, stridor.  On vent, changed to spontaneous trial earlier today, did well with that and then extubated. Able to talk with Dr. Bhagat.     Vital Sign Min/Max for last 24 hours  Temp  Min: 98.2 °F (36.8 °C)  Max: 98.9 °F (37.2 °C)   BP  Min: 107/61  Max: 157/81   Pulse  Min: 44  Max: 96   Resp  Min: 20  Max: 24   SpO2  Min: 94 %  Max: 99 %   Flow (L/min)  Min: 6  Max: 6   No Data Recorded     Awake, alert  perrl, no icterus,eomi  mmm, no jvd, trachea midline, neck supple,  chest cta bilaterally, no crackles, no wheezes,   rrr,   soft, nt, nd +bs,  no c/c/ trace edema    Labs:  Cr 1.17  Bicarb 32  Wbc 7.4  hgb 12.7  plts 223    A:  1. Acute hypoxemic and hypercapnic respiratory failure -- plan weaning trial in the morning tomorrow   2. Laryngeal edema and cancer -- treatment options being discussed  3. Dysphagia -- will need peg tube as well  4. Aspiration pneumonia -- can transition to augmentin when po route available  5. Hyperkalemia -- resolved  6. Hyperglycemia -- on ssi, on steroids as well    Trach decision per Dr. Bhagat  Will monitor in ICU overnight post extubation    Prophylactic lovenox for now.

## 2019-02-01 NOTE — PROGRESS NOTES
Subjective     REASON FOR CONSULTATION:    1.  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  3.  Hoarse voice with vocal cord paralysis.        History of Present Illness  Rosalio Rene is a 78 y.o. 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     He has a history of squamous cell carcinoma of the right vocal cord which was treated with radiation in 2006.  He has followed up with Dr. Bhagat since that time.  He developed a hoarse voice and difficulty swallowing and he was found to have bilateral vocal cord paralysis.       An esophagram was performed on 12/19/2018 showing oropharyngeal dysphagia with nirali aspiration of barium extending into the trachea and right mainstem bronchus.       He had CT imaging of the neck done on 1/26/2019 that did not show evidence for enlarged lymphadenopathy and no discrete mass.        Dr. Bhagat performed a direct laryngoscopy under anesthesia on 1/29/2019.  This showed normal but paralyzed vocal cords.  Diffuse necrosis and ulceration was discovered on the posterior surface of the arytenoids, post cricoid and medial aspect of the pyriform sinuses.  Frozen section is positive for invasive squamous cell carcinoma.       Dr. Meyers with radiation oncology is seeing him, as well as Dr. Jefferson with pulmonary medicine.  There was concern for stridor and his ability to protect his airway following the procedure and he has been moved to the ICU for close monitoring.        INTERVAL HISTORY:  1/30  Became lethargic yesterday and had a CO2 of 150 and required intubation  Awake and alert and answering questions appropriately  Long discussion about options and at this point a PEG tube and tracheostomy are going to be needed if he wants any treatment and he does want  treatment  Without further radiation can be given but he does want chemotherapy    1/31  Awake alert intubated  Does not want to make a decision about tracheostomy till he is extubated and he can talk  He will go for the PEG tube  Will discuss treatment options now that radiation is not not on the table    2/1  Extubated voice weak.but   Audible  Agreeable to PEG tube declines tracheostomy because he wants to say mass at his [employer] as he is a   I suggested he may be able to plug his trach for these occasions but he does not want to  Discussed single agent cetuximab  Will follow up as an outpatient as we do not plan any chemotherapy during this admission  Will ask surgery for PEG tube      Past Medical History, Past Surgical History, Social History, Family History have been reviewed and are without significant changes except as mentioned.    Review of Systems   HENT: Positive for trouble swallowing and voice change.    Respiratory: Positive for cough.       A comprehensive 14 point review of systems was performed and was negative except as mentioned.    Medications:  The current medication list was reviewed in the EMR    ALLERGIES:  No Known Allergies    Objective      Vitals:    02/01/19 0805 02/01/19 0823 02/01/19 0905 02/01/19 0910   BP: 146/81  143/83    Pulse: 65 92 69    Resp:    20   Temp:    97.2 °F (36.2 °C)   TempSrc:    Oral   SpO2: 96% 96% 97%    Weight:       Height:         No flowsheet data found.    Physical Exam  Constitutional:extubated awake and alert. He appears well-developed and well-nourished. No distress.   HENT:   Head: Normocephalic and atraumatic. Hair is normal.   Mouth/Throat: Oropharynx is clear and moist.   Eyes: Conjunctivae, EOM and lids are normal. Pupils are equal.   Neck: Neck supple. No JVD present. No thyroid mass and no thyromegaly present.   Cardiovascular: Normal rate and regular rhythm. Exam reveals no gallop and no friction rub.   No murmur heard.  Pulmonary/Chest:  Effort normal and breath sounds normal.  No respiratory distress. He has no wheezes. He has no rales.   Upper respiratory rhonchi   Abdominal: Soft. He exhibits no distension and no mass. There is no splenomegaly or hepatomegaly. There is no tenderness. There is no guarding.   Musculoskeletal: Normal range of motion. He exhibits no edema, tenderness or deformity.   Lymphadenopathy:     He has no cervical adenopathy.     He has no axillary adenopathy.        Right: No inguinal and no supraclavicular adenopathy present.        Left: No inguinal and no supraclavicular adenopathy present.   Neurological: He is alert and oriented to person, place, and time. He has normal strength.   Skin: Skin is warm and dry. No rash noted.   3 cm cyst right upper chest   Psychiatric: He has a normal mood and affect. His behavior is normal. Judgment and thought content normal. Cognition and memory are normal.   Nursing note and vitals reviewed          RECENT LABS:  Hematology WBC   Date Value Ref Range Status   02/01/2019 7.40 4.50 - 10.70 10*3/mm3 Final     RBC   Date Value Ref Range Status   02/01/2019 4.49 (L) 4.60 - 6.00 10*6/mm3 Final     Hemoglobin   Date Value Ref Range Status   02/01/2019 12.7 (L) 13.7 - 17.6 g/dL Final     Hematocrit   Date Value Ref Range Status   02/01/2019 41.1 40.4 - 52.2 % Final     Platelets   Date Value Ref Range Status   02/01/2019 223 140 - 500 10*3/mm3 Final       Intraoperative Consultation P   The frozen section diagnosis for specimen 1 was called to Dr. Bhagat on 1/29/2019 at 8:39AM.  1AFS: Invasive Squamous Cell Carcinoma (SWM).        CXR  IMPRESSION:     1. Tubes and lines terminate in satisfactory position.  2. Bibasilar consolidation, concerning for pneumonia. Short-term  follow-up exam is recommended.    Assessment/Plan   1. Recurrent head and neck squamous cell carcinoma  · Dr. Meyers with radiation oncology is seeing to determine if he will be a candidate for further radiation  · Consider  concurrent therapy with cetuximab or single agent cetuximab without radiation.    · Agree with outpatient PET scan  2. H/o SCC R vocal cord s/p radiation in 2006  · Old records not available for review  3. Dysphagia with aspiration  · He will  need a PEG tube  4. Stridor:  Bilateral vocal cord paralysis  · Concern for his ability to protect airway, particularly if her proceeds with further radiation  · Currently requiring intubation in the ICU  · will need a tracheostomy     RECOMMENDATIONS/PLAN:   1.   Peg tube if treatment is planned   2. .  Outpatient PET scan  Consider single agent therapy with cetuximab  Discussed with patient and he does want treatment.   both PEG tube and tracheostomy would be preferrred  He will agree to the PEG tube only  `will see as outpt in 7-10 days                  2/1/2019      CC:

## 2019-02-01 NOTE — PROGRESS NOTES
I was with the patient when he was extubated this morning.  He is doing well so far.  I had a lengthy discussion with him and his nephew, Alan, about his options.  It has been determined that radiation is not an option for him.  Other options include total laryngectomy which he rejects, treatment with the medical oncologist or no treatment and move forward with hospice care.  The pros and cons of tracheostomy and PEG were reviewed whether he moves forward with treatment or not.  He would like to think about his options and discuss this with the oncologist.  I will check with them to see if I can transition his care to them at this point.

## 2019-02-01 NOTE — PROGRESS NOTES
"Progress Note    CC: Dysphagia    S: There were no events overnight.  Extubated this am    O:/69 (BP Location: Right arm, Patient Position: Lying)   Pulse 79   Temp 99.1 °F (37.3 °C) (Oral)   Resp 18   Ht 180.3 cm (70.98\")   Wt 89.8 kg (197 lb 15.6 oz)   SpO2 94%   BMI 27.62 kg/m²   Body mass index is 27.62 kg/m².    I/O last 3 completed shifts:  In: 2950.8 [I.V.:2540.8; Other:90; NG/GT:120; IV Piggyback:200]  Out: 4975 [Urine:4775; Emesis/NG output:200]  I/O this shift:  In: 100 [IV Piggyback:100]  Out: -       GENERAL:alert, well appearing, and in no distress and oriented to person, place, and time  HEENT: normochephalic, atraumatic, moist mucus membranes, clear sclerae   CHEST: clear to auscultation, no wheezes, rales or rhonchi, symmetric air entry  CARDIAC: regular rate and rhythm    ABDOMEN: soft, nontender, nondistended, no masses or organomegaly  EXTREMITIES: no cyanosis, clubbing or edema    SKIN: Warm and moist, no rashes    Results from last 7 days   Lab Units 02/01/19  0539   WBC 10*3/mm3 7.40   HEMOGLOBIN g/dL 12.7*   HEMATOCRIT % 41.1   PLATELETS 10*3/mm3 223     Results from last 7 days   Lab Units 02/01/19  0539 01/31/19  1357 01/31/19  0703   SODIUM mmol/L 139 140 141   POTASSIUM mmol/L 3.9 4.0 4.5   CHLORIDE mmol/L 95* 96* 100   CO2 mmol/L 32.0* 33.0* 28.3   BUN mg/dL 45* 45* 46*   CREATININE mg/dL 1.17 1.23 1.18   CALCIUM mg/dL 9.3 9.2 9.0   BILIRUBIN mg/dL  --   --  0.6   ALK PHOS U/L  --   --  37*   ALT (SGPT) U/L  --   --  10   AST (SGOT) U/L  --   --  13   GLUCOSE mg/dL 168* 156* 181*       ROS:   Constitutional: denies any weight changes, fatigue or weakness.  Gastrointestinal: denies N&V, abd pain, diarrhea, constipation.  Genitourinary: denies dysuria, frequency.    DIET: NPO    A/P: 78 y.o. male with laryngeal cancer and dysphagia causing aspiration. He did well after intubation. He is doing fine. Discussed with him about the need for PEG placement. Discussed with him about " the risk and benefits including bleeding, infections, intrabdominal perforation. She verbalized understanding and agreed with the plan.     - Plan for PEG tomorrow  - NPO after midnight  - Consent.   - Hold anticoagulation armenight    Twin Bobby MD  General, Minimally Invasive and Endoscopic Surgery  [facility], [place]    [address], [address]  [place], [place], [address]  P: [phone]  F: [phone]

## 2019-02-01 NOTE — PROGRESS NOTES
Continued Stay Note  [facility]     Patient Name: Rosalio Rene  MRN: [id number]  Today's Date: 2/1/2019    Admit Date: 1/29/2019    Discharge Plan     Row Name 02/01/19 1206       Plan    Plan  TBD; follow up with tx plan    Patient/Family in Agreement with Plan  yes    Plan Comments  Pt wants to discuss options with family. CCP will follow up with tx plan and assist with d/c needs. AMILCAR Plummer        Discharge Codes    No documentation.             Stacia Rodriguez

## 2019-02-02 NOTE — BRIEF OP NOTE
PERCUTANEOUS ENDOSCOPIC GASTROSTOMY TUBE INSERTION, ESOPHAGOGASTRODUODENOSCOPY  Progress Note    Rosalio Rene  1/29/2019 - 2/2/2019    Pre-op Diagnosis:   Oropharyngeal dysphagia [R13.12]       Post-Op Diagnosis Codes:     * Oropharyngeal dysphagia [R13.12]    Procedure/CPT® Codes:      Procedure(s):  PERCUTANEOUS ENDOSCOPIC GASTROSTOMY TUBE INSERTION  ESOPHAGOGASTRODUODENOSCOPY    Surgeon(s):  Twin Bobby MD    Anesthesia: Monitor Anesthesia Care    Staff:   Documenter: Lucinda Madrigal RN  Endo Technician: Guerrero Mendez  Endo Nurse: Renu Norton RN    Estimated Blood Loss: minimal    Urine Voided: * No values recorded between 2/2/2019  9:34 AM and 2/2/2019 10:08 AM *    Specimens:                None      Drains:   Gastrostomy/Enterostomy Percutaneous endoscopic gastrostomy (PEG) LUQ (Active)       External Urinary Catheter (Active)   Site Assessment Skin intact;Clean 2/2/2019  7:45 AM   Application/Removal skin care provided 2/2/2019  7:45 AM   Collection Container Standard drainage bag 2/2/2019  7:45 AM   Securement Method Securing device 2/2/2019  7:45 AM   Output (mL) 1200 mL 2/2/2019  5:59 AM       Findings: PEG tube 2.5 cm at skin    Complications: None      Twin Bobby MD     Date: 2/2/2019  Time: 10:08 AM

## 2019-02-02 NOTE — NURSING NOTE
Dr Lemon returned call and pt can transfer to [facility] and he said the steroids can be rapidly tapered, Dr Dubon notified

## 2019-02-02 NOTE — ANESTHESIA PREPROCEDURE EVALUATION
Anesthesia Evaluation     Patient summary reviewed                Airway   Mallampati: II  No difficulty expected  Dental      Pulmonary    Cardiovascular     ECG reviewed  Rhythm: regular    (+) hypertension,       Neuro/Psych  GI/Hepatic/Renal/Endo    (+)   diabetes mellitus,     Musculoskeletal     Abdominal    Substance History      OB/GYN          Other      history of cancer active                    Anesthesia Plan    ASA 3     MAC     Anesthetic plan, all risks, benefits, and alternatives have been provided, discussed and informed consent has been obtained with: patient.  Use of blood products discussed with patient .

## 2019-02-02 NOTE — OP NOTE
PREOPERATIVE DIAGNOSIS:  Dysphagia    POSTOPERATIVE DIAGNOSIS AND FINDINGS:  Same    PROCEDURE:  - Percutaneous endoscopic gastrostomy tube placement with upper endoscopy    SURGEON:  Twin Bobby M.D.    ANESTHESIA:  MAC    EBL:  Minimal    DESCRIPTION:  In supine position under general anesthetic patient was prepped and draped in the usual sterile manner. Gastroscope was inserted into the esophagus and stomach with easy transillumination achieved in the epigastrium and left upper quadrant. Stomach was normal without evidence of ulcers but patchy areas of erythema. Esophagus was normal and there were no masses or erythema.  Small incision was made needle was inserted into the stomach. Guidewire was pulled through the mouth and gastrostomy tube was pulled into the stomach over the guidewire.Good hemostasis noted. Gastrostomy tube secured at skin level at 2.5 cm. Endoscope reinserted into the mouth to the stomach and confirmation of PEG tube position was performed. Scope withdrawal and patient sent to recovery in stable condition.     Twin Bobby MD  General, Minimally Invasive and Endoscopic Surgery  [facility]  [address], [address]  [place], [place], [address]  P: [phone]  F: [phone]

## 2019-02-02 NOTE — NURSING NOTE
Dr Lemon called back and will contact Dr Bhagat regarding transferring pt to [facility] and when to wean steroids and will call back

## 2019-02-02 NOTE — ANESTHESIA POSTPROCEDURE EVALUATION
Patient: Rosalio Rene    Procedure Summary     Date:  02/02/19 Room / Location:  [facility] OR  / [facility] MAIN OR    Anesthesia Start:  0936 Anesthesia Stop:  1022    Procedures:       PERCUTANEOUS ENDOSCOPIC GASTROSTOMY TUBE INSERTION (N/A Esophagus)      ESOPHAGOGASTRODUODENOSCOPY (N/A Esophagus) Diagnosis:       Oropharyngeal dysphagia      (Oropharyngeal dysphagia [R13.12])    Surgeon:  Twin Bobby MD Provider:  Davon Prado MD    Anesthesia Type:  MAC ASA Status:  3          Anesthesia Type: MAC  Last vitals  BP   139/78 (02/02/19 1035)   Temp   37 °C (98.6 °F) (02/02/19 0906)   Pulse   87 (02/02/19 1035)   Resp   16 (02/02/19 1035)     SpO2   94 % (02/02/19 1035)     Post Anesthesia Care and Evaluation    Patient location during evaluation: PACU  Patient participation: complete - patient participated  Level of consciousness: awake  Pain score: 2  Pain management: adequate  Airway patency: patent  Anesthetic complications: No anesthetic complications  PONV Status: none  Cardiovascular status: acceptable  Respiratory status: acceptable  Hydration status: acceptable

## 2019-02-02 NOTE — PLAN OF CARE
Problem: Patient Care Overview  Goal: Plan of Care Review  Outcome: Ongoing (interventions implemented as appropriate)    Goal: Individualization and Mutuality  Outcome: Outcome(s) achieved Date Met: 02/02/19    Goal: Discharge Needs Assessment  Outcome: Ongoing (interventions implemented as appropriate)    Goal: Interprofessional Rounds/Family Conf  Outcome: Ongoing (interventions implemented as appropriate)      Problem: Fall Risk (Adult)  Goal: Identify Related Risk Factors and Signs and Symptoms  Outcome: Outcome(s) achieved Date Met: 02/02/19    Goal: Absence of Fall  Outcome: Ongoing (interventions implemented as appropriate)      Problem: Skin Injury Risk (Adult)  Goal: Identify Related Risk Factors and Signs and Symptoms  Outcome: Outcome(s) achieved Date Met: 02/02/19    Goal: Skin Health and Integrity  Outcome: Ongoing (interventions implemented as appropriate)      Problem: Anxiety (Adult)  Goal: Identify Related Risk Factors and Signs and Symptoms  Outcome: Outcome(s) achieved Date Met: 02/02/19    Goal: Reduction/Resolution  Outcome: Ongoing (interventions implemented as appropriate)      Problem: Pain, Acute (Adult)  Goal: Identify Related Risk Factors and Signs and Symptoms  Outcome: Outcome(s) achieved Date Met: 02/02/19    Goal: Acceptable Pain Control/Comfort Level  Outcome: Ongoing (interventions implemented as appropriate)      Problem: Dysphagia (Adult)  Goal: Identify Related Risk Factors and Signs and Symptoms  Outcome: Outcome(s) achieved Date Met: 02/02/19    Goal: Functional/Safe Swallow  Outcome: Ongoing (interventions implemented as appropriate)    Goal: Compensatory Techniques to Improve Safety/Function with Swallowing  Outcome: Ongoing (interventions implemented as appropriate)

## 2019-02-02 NOTE — PROGRESS NOTES
LOS: 3 days   Patient Care Team:  Erinn Howard MD as PCP - General (Internal Medicine)  Erinn Howard MD as PCP - Family Medicine  Nida Meyers MD as Consulting Physician (Radiation Oncology)    Subjective     Well-developed white male resting in bed he just returned from his PEG tube says really not having too much pain or anything from his tube.  His biggest concern is how quickly get home.  Nursing reports that the ability time he is been out of bed since he came in was to go to the bathroom yesterday afternoon they're little concerned about his strength and would like physical therapy to evaluate.  Nursing reports that to wait about 24 hours post tube placement to start tube feeds.    Review of Systems:          Objective     Vital Signs  Vital Sign Min/Max for last 24 hours  Temp  Min: 98.5 °F (36.9 °C)  Max: 99.1 °F (37.3 °C)   BP  Min: 114/79  Max: 147/90   Pulse  Min: 64  Max: 93   Resp  Min: 16  Max: 22   SpO2  Min: 81 %  Max: 98 %   Flow (L/min)  Min: 2  Max: 2   No Data Recorded        Ventilator/Non-Invasive Ventilation Settings (From admission, onward)    Start     Ordered    01/30/19 0142  Ventilator - AC/VC; (16); 50; 5; 450  Continuous     Question Answer Comment   Vent Mode AC/VC    Breath rate  16   FiO2 50    PEEP 5    Tidal Volume 450        01/30/19 0141    01/29/19 1415  NIPPV (CPAP or BIPAP)  At Bedtime & As Needed-RT,   Status:  Canceled     Question Answer Comment   Indication: Sleep Apnea    Type: CPAP    NIPPV Mask Interface Per Patient Preference    Pressure 10    Titrate for SPO2 90%        01/29/19 1414                       Body mass index is 27.62 kg/m².  I/O last 3 completed shifts:  In: 1219.2 [I.V.:1019.2; IV Piggyback:200]  Out: 3300 [Urine:3300]  I/O this shift:  In: 300 [I.V.:300]  Out: -         Physical Exam:  General Appearance: Well-developed white male resting in bed does not appear in any acute distress  Eyes: Conjunctiva are clear and anicteric  ENT:  Mucous membranes are moist his voice is a little hoarse.  He has a ecchymoses on his uvula and soft palate and look to be several days old and resolving.  Nasal septum midline nasal mucosa little dry  Neck: Trachea is midline there is no stridor no palpable lymphadenopathy or thyromegaly no jugular venous distention  Lungs: Clear nonlabored symmetric expansion no wheezes rales or rhonchi  Cardiac: Regular rate and rhythm no murmur  Abdomen: Soft no palpable organomegaly or masses  : Not examined he has a condom catheter in place  Musculoskeletal: Grossly normal  Skin: He has a large growth on his upper mid right anterior chest  Neuro: He is alert and oriented he is cooperative following commands moving all extremities grossly intact  Extremities/P Vascular: No clubbing no cyanosis no edema palpable radial and dorsalis pedis pulses bilaterally  MSE: Other being anxious to get home he seems to be in reasonably good spirits and appropriate       Labs:  Results from last 7 days   Lab Units 02/02/19  0407 02/01/19  0539 01/31/19  1357 01/31/19  0703 01/30/19  0428 01/29/19  1438   GLUCOSE mg/dL 100* 168* 156* 181* 155* 133*   SODIUM mmol/L 144 139 140 141 143 138   POTASSIUM mmol/L 3.0* 3.9 4.0 4.5 5.7* 5.2   MAGNESIUM mg/dL  --   --   --   --  2.0  --    CO2 mmol/L 29.7* 32.0* 33.0* 28.3 30.5* 32.8*   CHLORIDE mmol/L 102 95* 96* 100 100 100   ANION GAP mmol/L 12.3 12.0 11.0 12.7 12.5 5.2   CREATININE mg/dL 1.03 1.17 1.23 1.18 1.35* 0.98   BUN mg/dL 36* 45* 45* 46* 41* 33*   BUN / CREAT RATIO  35.0* 38.5* 36.6* 39.0* 30.4* 33.7*   CALCIUM mg/dL 8.7 9.3 9.2 9.0 8.7 8.9   EGFR IF NONAFRICN AM mL/min/1.73 70 60* 57* 60* 51* 74   ALK PHOS U/L  --   --   --  37*  --   --    TOTAL PROTEIN g/dL  --   --   --  6.3  --   --    ALT (SGPT) U/L  --   --   --  10  --   --    AST (SGOT) U/L  --   --   --  13  --   --    BILIRUBIN mg/dL  --   --   --  0.6  --   --    ALBUMIN g/dL  --   --   --  2.90*  --   --    GLOBULIN gm/dL  --    --   --  3.4  --   --      Estimated Creatinine Clearance: 75.1 mL/min (by C-G formula based on SCr of 1.03 mg/dL).      Results from last 7 days   Lab Units 02/02/19  0407 02/01/19  0539 01/31/19  0605 01/30/19  0312 01/29/19  1438   WBC 10*3/mm3 8.01 7.40 8.97 8.06 8.50   RBC 10*6/mm3 4.36* 4.49* 4.26* 3.93* 4.25*   HEMOGLOBIN g/dL 12.3* 12.7* 12.1* 11.3* 12.0*   HEMATOCRIT % 40.5 41.1 39.7* 39.2* 41.9   MCV fL 92.9 91.5 93.2 99.7* 98.6*   MCH pg 28.2 28.3 28.4 28.8 28.2   MCHC g/dL 30.4* 30.9* 30.5* 28.8* 28.6*   RDW % 15.2* 15.0* 15.0* 14.4 14.9*   RDW-SD fl 51.8 50.7 50.7 53.2 53.5   MPV fL 10.4 10.5 10.7 11.2 10.4   PLATELETS 10*3/mm3 194 223 225 193 227   NEUTROPHIL % %  --  83.1*  --   --  94.0*   LYMPHOCYTE % %  --  7.6*  --   --  5.2*   MONOCYTES % %  --  8.9  --   --  0.2*   EOSINOPHIL % %  --  0.1*  --   --  0.0*   BASOPHIL % %  --  0.0  --   --  0.2   IMM GRAN % %  --  0.3  --   --  0.4   NEUTROS ABS 10*3/mm3  --  6.15  --   --  7.99   LYMPHS ABS 10*3/mm3  --  0.56*  --   --  0.44*   MONOS ABS 10*3/mm3  --  0.66  --   --  0.02*   EOS ABS 10*3/mm3  --  0.01  --   --  0.00   BASOS ABS 10*3/mm3  --  0.00  --   --  0.02   IMMATURE GRANS (ABS) 10*3/mm3  --  0.02  --   --  0.03     Results from last 7 days   Lab Units 01/30/19  0242   PH, ARTERIAL pH units 7.202*   PO2 ART mm Hg 87.0   PCO2, ARTERIAL mm Hg 86.1*   HCO3 ART mmol/L 33.8*     Results from last 7 days   Lab Units 01/30/19  0428   TROPONIN T ng/mL 0.021         Results from last 7 days   Lab Units 02/01/19  0539   TSH mIU/mL 1.150   FREE T4 ng/dL 1.40             Microbiology Results (last 10 days)     ** No results found for the last 240 hours. **                aspirin 81 mg Oral Daily   enoxaparin 40 mg Subcutaneous Q24H   famotidine 20 mg Intravenous Q12H   insulin lispro 0-14 Units Subcutaneous Q4H   methylPREDNISolone sodium succinate 40 mg Intravenous Q24H   metoprolol tartrate 5 mg Intravenous Q6H   piperacillin-tazobactam 3.375 g  Intravenous Q8H       lactated ringers 9 mL/hr Last Rate: 9 mL/hr (02/02/19 0927)   Pharmacy to Dose Zosyn     sodium chloride 50 mL/hr Last Rate: 50 mL/hr (02/01/19 0936)       Diagnostics:  Ct Soft Tissue Neck With Contrast    Result Date: 1/29/2019  CT NECK WITH CONTRAST  HISTORY: Vocal cord cancer.  COMPARISON: 08/2018.  FINDINGS: The visualized portions of the skull base appear unremarkable. The parotid, submandibular thyroid gland appear unremarkable.  The epiglottis appears unremarkable. The cords are symmetrical. The aryepiglottic folds are at the upper limits of normal in size. No discrete mass is identified. There is no pathologic adenopathy. The superficial cutaneous lesion is appreciated involving the chest wall anteriorly to the right of midline just below the clavicle similar in appearance when compared to the examination of 8/7/2018 and measuring approximately 28 mm in size.      1. No discrete mass involving the cord is appreciated. The aryepiglottic folds are at the upper limits of normal in size. Clinical correlation and direct visualization is suggested. There is no evidence of pathologic adenopathy. 2. Superficial cutaneous lesion involving the chest wall anteriorly medially just below the clavicle similar in appearance when compared to the examination of 08/07/2018, nonspecific. Directed physical examination is suggested.    Radiation dose reduction techniques were utilized, including automated exposure control and exposure modulation based on body size.  This report was finalized on 1/29/2019 1:22 PM by Dr. Alo Navarro M.D.      Xr Chest 1 View    Result Date: 1/30/2019  PORTABLE CHEST RADIOGRAPH; KUB  HISTORY: Intubation and nasogastric tube placement  COMPARISON: None available.  FINDINGS: Endotracheal tubes terminates in satisfactory position. Patient also appears to be nasogastric tube which extends into the body of the stomach. Cardiomegaly is identified. There is some vascular  congestion, but patient is also noted to have extensive basilar consolidation concerning for pneumonia. There are bilateral pleural effusions. No pneumothorax is seen. Visualized bowel gas pattern is unremarkable.       1. Tubes and lines terminate in satisfactory position. 2. Bibasilar consolidation, concerning for pneumonia. Short-term follow-up exam is recommended.  This report was finalized on 1/30/2019 1:58 AM by Dr. Nadia Baez M.D.      Xr Abdomen Kub    Result Date: 1/30/2019  PORTABLE CHEST RADIOGRAPH; KUB  HISTORY: Intubation and nasogastric tube placement  COMPARISON: None available.  FINDINGS: Endotracheal tubes terminates in satisfactory position. Patient also appears to be nasogastric tube which extends into the body of the stomach. Cardiomegaly is identified. There is some vascular congestion, but patient is also noted to have extensive basilar consolidation concerning for pneumonia. There are bilateral pleural effusions. No pneumothorax is seen. Visualized bowel gas pattern is unremarkable.       1. Tubes and lines terminate in satisfactory position. 2. Bibasilar consolidation, concerning for pneumonia. Short-term follow-up exam is recommended.  This report was finalized on 1/30/2019 1:58 AM by Dr. Nadia Baez M.D.      Results for orders placed during the hospital encounter of 10/24/18   Adult Transthoracic Echo Complete W/ Cont if Necessary Per Protocol    Narrative · Left ventricular systolic function is hyperdynamic (EF > 70%).   Calculated EF = 71%. Estimated EF was in agreement with the calculated EF.   Normal left ventricular cavity size noted. All left ventricular wall   segments contract normally. Left ventricular wall thickness is consistent   with mild concentric hypertrophy. Left ventricular diastolic dysfunction   is noted (grade I) consistent with impaired relaxation.  · Normal right ventricular wall thickness and systolic function noted.   Right ventricular cavity is  moderately dilated. Abnormal septal motion.   Systolic flattening of interventricular septum consistent with right   ventricle pressure overload.  · Right atrial cavity size is moderately dilated  · The aortic valve is abnormal in structure. There is thickening of the   aortic valve.  · Physiologic tricuspid valve regurgitation is present. Calculated right   ventricular systolic pressure from tricuspid regurgitation is 21 mmHg.   Insufficient TR velocity profile to estimate the right ventricular   systolic pressure. This is an incomplete jet of tricuspid regurgitation   and cannot assess RV systolic pressure adequately/accurately based on the   signal. From the 2-D images and findings of systolic flattening of the   ventricular septum, there is likely significant pulmonary hypertension.  · Patient was contacted to return for additional imaging but absolutely   refused to do so.              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         Assessment/Plan     1. 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  2. Dysphagia PEG tube placed earlier today Supposed to hold on tube feeds for 24 hours we'll order for tomorrow make sure he tolerates before he can go home  3. Acute hypoxemic and hypercapnic respiratory failure Much improved, edema resolved question for ENT is when we can discontinue steroids.  Will wean O2 as tolerated.  4. Hyperglycemia steroid induced on sliding scale insulin   5. Hyperkalemia resolved now hypokalemic replace carefully    Plan for disposition: Home in a couple of days?    Hussein Dubon MD  02/02/19  11:35  AM    Time:

## 2019-02-02 NOTE — PLAN OF CARE
Problem: Patient Care Overview  Goal: Plan of Care Review   02/02/19 1738   OTHER   Outcome Summary PT transferred from ICU, recieved rimaion x4 out of 6 runs, PT educated on the need to turn Q2H considering he already has pressure ulcer on his coccyx. PT had peg tube placed to bedside drainage today, tube feedings to start tomorrow. IV ABX continued. Will continue to monitor.

## 2019-02-03 PROBLEM — J69.0 ASPIRATION PNEUMONIA OF BOTH LOWER LOBES (HCC): Status: ACTIVE | Noted: 2019-01-01

## 2019-02-03 PROBLEM — J96.02 ACUTE RESPIRATORY FAILURE WITH HYPOXIA AND HYPERCAPNIA (HCC): Status: ACTIVE | Noted: 2019-01-01

## 2019-02-03 PROBLEM — I10 HYPERTENSION: Chronic | Status: ACTIVE | Noted: 2019-01-01

## 2019-02-03 PROBLEM — J44.9 COPD (CHRONIC OBSTRUCTIVE PULMONARY DISEASE) (HCC): Chronic | Status: ACTIVE | Noted: 2019-01-01

## 2019-02-03 PROBLEM — J96.01 ACUTE RESPIRATORY FAILURE WITH HYPOXIA AND HYPERCAPNIA (HCC): Status: ACTIVE | Noted: 2019-01-01

## 2019-02-03 PROBLEM — J38.00 VOCAL CORD PARALYSIS: Status: ACTIVE | Noted: 2019-01-01

## 2019-02-03 PROBLEM — E11.9 TYPE 2 DIABETES MELLITUS (HCC): Chronic | Status: ACTIVE | Noted: 2019-01-01

## 2019-02-03 PROBLEM — E87.6 HYPOKALEMIA: Status: ACTIVE | Noted: 2019-01-01

## 2019-02-03 PROBLEM — L89.159 DECUBITUS ULCER OF COCCYX, UNSPECIFIED PRESSURE ULCER STAGE: Status: ACTIVE | Noted: 2019-01-01

## 2019-02-03 NOTE — PROGRESS NOTES
Cc: Dysphagia    POD# 1 s/p PEG tube    S: No complaints, no abdominal pain    O:   Vitals:    02/02/19 2126 02/03/19 0309 02/03/19 0600 02/03/19 0821   BP: 136/74 129/72  134/73   BP Location: Left arm Left arm  Left arm   Patient Position: Lying Lying  Lying   Pulse: 80 73  75   Resp: 16 16  16   Temp: 98.3 °F (36.8 °C) 97.8 °F (36.6 °C)  99.3 °F (37.4 °C)   TempSrc: Oral Oral  Oral   SpO2: 92% 92%  91%   Weight:  85.9 kg (189 lb 6.4 oz) 86 kg (189 lb 9.6 oz)    Height:         Aox3, NAD  cta b/l, rrr  Abdomen soft and not tender, PEG in place with small amount of crusted blood around bumper  No edemas    POD# 1 s/p PEG tube, no issues, skin cleaned.     - Start tube feeds at 20 cc x hr and advance by 20 cc every 4 hrs to goal  - Follow up in my office when ready to have tube removed  - PEG care    Twin Bobby MD, FACS  General, Minimally Invasive and Endoscopic Surgery  [facility]    [address], [address]  [place], [place], [address]  P: [phone]  F: [phone]

## 2019-02-03 NOTE — PLAN OF CARE
Problem: Patient Care Overview  Goal: Plan of Care Review  Outcome: Ongoing (interventions implemented as appropriate)   02/03/19 0511   Coping/Psychosocial   Plan of Care Reviewed With patient   Plan of Care Review   Progress no change   OTHER   Outcome Summary vital signs stable overnight. patient remains on room air throughout the shift. no c/o shortness of breath or pain. condom cath in place to bsd. PEG to bedside drainage with green/brown output. continue to encourage patient to allow turns and repositioning to prevent skin breakdown-mepilex dsg applied to R buttock. up to BSC with assist x2. no falls. K+ replaced per protocol-recheck with am labs. continue IVF and abx as ordered. ctm      Goal: Discharge Needs Assessment  Outcome: Ongoing (interventions implemented as appropriate)

## 2019-02-03 NOTE — CONSULTS
Adult Nutrition  Assessment/PES    Patient Name:  Rosalio Rene  YOB: 1940  MRN: [id number]  Admit Date:  1/29/2019    Assessment Date:  2/3/2019    Comments:  PEG placed and bolus tube feedings to be initiated today. Changed formula to Nutren 1.5 to reduce volume and shorten feeding schedule. Goal 360 ml four times per day during waking hours. Water flush 100 ml each bolus (50 before and 50 after).    Reason for Assessment     Row Name 02/03/19 1147          Reason for Assessment    Reason For Assessment  physician consult;TF/PN         Nutrition/Diet History     Row Name 02/03/19 1148          Nutrition/Diet History    Supplemental Drinks/Foods/Additives  refused trach     Factors Affecting Nutritional Intake  difficulty/impaired swallowing;other (see comments);compromised airway paralyzed vocal cords         Anthropometrics     Row Name 02/03/19 0600          Anthropometrics    Weight  86 kg (189 lb 9.6 oz)         Labs/Tests/Procedures/Meds     Row Name 02/03/19 1148          Labs/Procedures/Meds    Lab Results Reviewed  reviewed     Lab Results Comments  BUN high        Medications    Pertinent Medications Reviewed  reviewed     Pertinent Medications Comments  antacid, antibiotic, insulin, steroid         Physical Findings     Row Name 02/03/19 1149          Physical Findings    Overall Physical Appearance  -- room air     Gastrointestinal  feeding tube     Tubes  PEG     Skin  pressure injury;other (see comments) Stage II pressure injuries bilat buttocks; Jaron score 15         Estimated/Assessed Needs     Row Name 02/03/19 1151 02/03/19 1150       Calculation Measurements    Weight Used For Calculations  86 kg (189 lb 9.5 oz)  86 kg (189 lb 9.5 oz)       Calorie Requirements    Weight Used For Calorie Calculations  --  86 kg (189 lb 9.5 oz)    Estimated Calorie Requirement (kcal/day)  --  2150    Estimated Calorie Requirement Comment  --  25 nikita/kg       KCAL/KG    14 Kcal/Kg (kcal)  1204   1204    15 Kcal/Kg (kcal)  1290  1290    18 Kcal/Kg (kcal)  1548  1548    20 Kcal/Kg (kcal)  1720  1720    25 Kcal/Kg (kcal)  2150  2150    30 Kcal/Kg (kcal)  2580  2580    35 Kcal/Kg (kcal)  3010  3010    40 Kcal/Kg (kcal)  3440  3440    45 Kcal/Kg (kcal)  3870  3870    50 Kcal/Kg (kcal)  4300  4300       [place]-St. Jeor Equation    RMR ([place]-St. Jeor Equation)  1601.87  1601.87       Protein Requirements    Est Protein Requirement Amount (gms/kg)  --  1.2 gm protein    Estimated Protein Requirements (gms/day)  --  103.2       Fluid Requirements    Nellie-Toby Method (over 20 kg)  3220  3220        Nutrition Prescription Ordered     Row Name 02/03/19 1151          Nutrition Prescription PO    Current PO Diet  NPO        Nutrition Prescription EN    Enteral Route  PEG         Evaluation of Received Nutrient/Fluid Intake     Row Name 02/03/19 1151 02/03/19 1150       Calculation Measurements    Weight Used For Calculations  86 kg (189 lb 9.5 oz)  86 kg (189 lb 9.5 oz)       Nutrient/Fluid Evaluation    Number of Days Evaluated  1 day  --    Additional Documentation  Fluid Intake Evaluation (Group)  --       Fluid Intake Evaluation    IV Fluid (mL)  1200  --        Evaluation of Prescribed Nutrient/Fluid Intake     Row Name 02/03/19 1151 02/03/19 1150       Calculation Measurements    Weight Used For Calculations  86 kg (189 lb 9.5 oz)  86 kg (189 lb 9.5 oz)            Problem/Interventions:            Intervention Goal     Row Name 02/03/19 1152          Intervention Goal    General  Maintain nutrition;Nutrition support treatment     TF/PN  Inititiate TF/PN     Weight  No significant weight loss         Nutrition Intervention     Row Name 02/03/19 1152          Nutrition Intervention    RD/Tech Action  Follow Tx progress;Care plan reviewd         Nutrition Prescription     Row Name 02/03/19 1152          Nutrition Prescription EN    Enteral Prescription  Enteral begin/change;Enteral to supply     Enteral Route   PEG     Product  Other (comment) Nutren 1.5     TF Delivery Method  Bolus     TF Bolus Goal Volume (mL)  360 mL     TF Bolus Starting Volume (mL)  120 mL     TF Bolus Frequency  4 times a day     Water flush (mL)   100 mL     Water Flush Frequency  Every feeding     New EN Prescription Ordered?  Yes        EN to Supply    Kcal/Day  2160 Kcal/Day     Kcal/Kg  25 Kcal/Kg     Kcal/Kg Weight Method  Actual weight     Protein (gm/day)  97.92 gm/day     Meet Estimated Kcal Need (%)  100 %     Meet Estimated Protein Need (%)  94 %     TF Free H2O (mL)  1100 mL     Total Free H2O (mL/day)  1500 mL/day         Education/Evaluation     Row Name 02/03/19 9230          Education    Education  Will Instruct as appropriate        Monitor/Evaluation    Monitor  Per protocol;I&O;Pertinent labs;Weight;Skin status           Electronically signed by:  Angie Fuller RD  02/03/19 12:02 PM

## 2019-02-03 NOTE — PROGRESS NOTES
LOS: 4 days   Patient Care Team:  Erinn Howard MD as PCP - General (Internal Medicine)  Erinn Howard MD as PCP - Family Medicine  Nida Meyers MD as Consulting Physician (Radiation Oncology)    Subjective     Reports he is doing well no pain no nausea no shortness of breath    Review of Systems:          Objective     Vital Signs  Vital Sign Min/Max for last 24 hours  Temp  Min: 97.5 °F (36.4 °C)  Max: 100.5 °F (38.1 °C)   BP  Min: 129/72  Max: 147/78   Pulse  Min: 73  Max: 89   Resp  Min: 16  Max: 16   SpO2  Min: 91 %  Max: 94 %   No Data Recorded   Weight  Min: 85.9 kg (189 lb 6.4 oz)  Max: 86 kg (189 lb 9.6 oz)        Ventilator/Non-Invasive Ventilation Settings (From admission, onward)    Start     Ordered    01/30/19 0142  Ventilator - AC/VC; (16); 50; 5; 450  Continuous     Question Answer Comment   Vent Mode AC/VC    Breath rate  16   FiO2 50    PEEP 5    Tidal Volume 450        01/30/19 0141    01/29/19 1415  NIPPV (CPAP or BIPAP)  At Bedtime & As Needed-RT,   Status:  Canceled     Question Answer Comment   Indication: Sleep Apnea    Type: CPAP    NIPPV Mask Interface Per Patient Preference    Pressure 10    Titrate for SPO2 90%        01/29/19 1414                       Body mass index is 26.46 kg/m².  I/O last 3 completed shifts:  In: 2281.7 [I.V.:1981.7; IV Piggyback:300]  Out: 2500 [Urine:2325; Emesis/NG output:175]  I/O this shift:  In: -   Out: 550 [Urine:550]        Physical Exam:  General Appearance: Well-developed white male resting in bed does not appear in any acute distress  Eyes: Conjunctiva are clear and anicteric  ENT: Mucous membranes are moist his voice is a little hoarse.  He has a ecchymoses on his uvula and soft palate and look to be several days old and resolving.  Nasal septum midline nasal mucosa little dry  Neck: Trachea is midline there is no stridor no palpable lymphadenopathy or thyromegaly no jugular venous distention  Lungs: Clear nonlabored symmetric  expansion no wheezes rales or rhonchi  Cardiac: Regular rate and rhythm no murmur  Abdomen: Soft no palpable organomegaly or masses  : Not examined   Musculoskeletal: Grossly normal  Skin: He has a large growth on his upper mid right anterior chest  Neuro: He is alert and oriented he is cooperative following commands moving all extremities grossly intact  Extremities/P Vascular: No clubbing no cyanosis no edema palpable radial and dorsalis pedis pulses bilaterally  MSE: He seems to be appropriate today       Labs:  Results from last 7 days   Lab Units 02/03/19  0626 02/02/19  0407 02/01/19  0539 01/31/19  1357 01/31/19  0703 01/30/19  0428 01/29/19  1438   GLUCOSE mg/dL 117* 100* 168* 156* 181* 155* 133*   SODIUM mmol/L 143 144 139 140 141 143 138   POTASSIUM mmol/L 3.7 3.0* 3.9 4.0 4.5 5.7* 5.2   MAGNESIUM mg/dL  --   --   --   --   --  2.0  --    CO2 mmol/L 28.3 29.7* 32.0* 33.0* 28.3 30.5* 32.8*   CHLORIDE mmol/L 101 102 95* 96* 100 100 100   ANION GAP mmol/L 13.7 12.3 12.0 11.0 12.7 12.5 5.2   CREATININE mg/dL 1.03 1.03 1.17 1.23 1.18 1.35* 0.98   BUN mg/dL 28* 36* 45* 45* 46* 41* 33*   BUN / CREAT RATIO  27.2* 35.0* 38.5* 36.6* 39.0* 30.4* 33.7*   CALCIUM mg/dL 8.9 8.7 9.3 9.2 9.0 8.7 8.9   EGFR IF NONAFRICN AM mL/min/1.73 70 70 60* 57* 60* 51* 74   ALK PHOS U/L  --   --   --   --  37*  --   --    TOTAL PROTEIN g/dL  --   --   --   --  6.3  --   --    ALT (SGPT) U/L  --   --   --   --  10  --   --    AST (SGOT) U/L  --   --   --   --  13  --   --    BILIRUBIN mg/dL  --   --   --   --  0.6  --   --    ALBUMIN g/dL  --   --   --   --  2.90*  --   --    GLOBULIN gm/dL  --   --   --   --  3.4  --   --      Estimated Creatinine Clearance: 71.9 mL/min (by C-G formula based on SCr of 1.03 mg/dL).      Results from last 7 days   Lab Units 02/02/19  0407 02/01/19  0539 01/31/19  0605 01/30/19  0312 01/29/19  1438   WBC 10*3/mm3 8.01 7.40 8.97 8.06 8.50   RBC 10*6/mm3 4.36* 4.49* 4.26* 3.93* 4.25*   HEMOGLOBIN g/dL  12.3* 12.7* 12.1* 11.3* 12.0*   HEMATOCRIT % 40.5 41.1 39.7* 39.2* 41.9   MCV fL 92.9 91.5 93.2 99.7* 98.6*   MCH pg 28.2 28.3 28.4 28.8 28.2   MCHC g/dL 30.4* 30.9* 30.5* 28.8* 28.6*   RDW % 15.2* 15.0* 15.0* 14.4 14.9*   RDW-SD fl 51.8 50.7 50.7 53.2 53.5   MPV fL 10.4 10.5 10.7 11.2 10.4   PLATELETS 10*3/mm3 194 223 225 193 227   NEUTROPHIL % %  --  83.1*  --   --  94.0*   LYMPHOCYTE % %  --  7.6*  --   --  5.2*   MONOCYTES % %  --  8.9  --   --  0.2*   EOSINOPHIL % %  --  0.1*  --   --  0.0*   BASOPHIL % %  --  0.0  --   --  0.2   IMM GRAN % %  --  0.3  --   --  0.4   NEUTROS ABS 10*3/mm3  --  6.15  --   --  7.99   LYMPHS ABS 10*3/mm3  --  0.56*  --   --  0.44*   MONOS ABS 10*3/mm3  --  0.66  --   --  0.02*   EOS ABS 10*3/mm3  --  0.01  --   --  0.00   BASOS ABS 10*3/mm3  --  0.00  --   --  0.02   IMMATURE GRANS (ABS) 10*3/mm3  --  0.02  --   --  0.03     Results from last 7 days   Lab Units 01/30/19  0242   PH, ARTERIAL pH units 7.202*   PO2 ART mm Hg 87.0   PCO2, ARTERIAL mm Hg 86.1*   HCO3 ART mmol/L 33.8*     Results from last 7 days   Lab Units 01/30/19  0428   TROPONIN T ng/mL 0.021         Results from last 7 days   Lab Units 02/01/19  0539   TSH mIU/mL 1.150   FREE T4 ng/dL 1.40             Microbiology Results (last 10 days)     ** No results found for the last 240 hours. **                aspirin 81 mg Oral Daily   carvedilol 3.125 mg Per G Tube Q12H   enoxaparin 40 mg Subcutaneous Q24H   famotidine 20 mg Intravenous Q12H   insulin lispro 0-14 Units Subcutaneous Q4H   methylPREDNISolone sodium succinate 40 mg Intravenous Q24H   piperacillin-tazobactam 3.375 g Intravenous Q8H       lactated ringers 9 mL/hr Last Rate: 9 mL/hr (02/02/19 0927)   Pharmacy to Dose Zosyn     sodium chloride 50 mL/hr Last Rate: 50 mL/hr (02/01/19 0936)       Diagnostics:  Ct Soft Tissue Neck With Contrast    Result Date: 1/29/2019  CT NECK WITH CONTRAST  HISTORY: Vocal cord cancer.  COMPARISON: 08/2018.  FINDINGS: The  visualized portions of the skull base appear unremarkable. The parotid, submandibular thyroid gland appear unremarkable.  The epiglottis appears unremarkable. The cords are symmetrical. The aryepiglottic folds are at the upper limits of normal in size. No discrete mass is identified. There is no pathologic adenopathy. The superficial cutaneous lesion is appreciated involving the chest wall anteriorly to the right of midline just below the clavicle similar in appearance when compared to the examination of 8/7/2018 and measuring approximately 28 mm in size.      1. No discrete mass involving the cord is appreciated. The aryepiglottic folds are at the upper limits of normal in size. Clinical correlation and direct visualization is suggested. There is no evidence of pathologic adenopathy. 2. Superficial cutaneous lesion involving the chest wall anteriorly medially just below the clavicle similar in appearance when compared to the examination of 08/07/2018, nonspecific. Directed physical examination is suggested.    Radiation dose reduction techniques were utilized, including automated exposure control and exposure modulation based on body size.  This report was finalized on 1/29/2019 1:22 PM by Dr. Alo Navarro M.D.      Xr Chest 1 View    Result Date: 1/30/2019  PORTABLE CHEST RADIOGRAPH; KUB  HISTORY: Intubation and nasogastric tube placement  COMPARISON: None available.  FINDINGS: Endotracheal tubes terminates in satisfactory position. Patient also appears to be nasogastric tube which extends into the body of the stomach. Cardiomegaly is identified. There is some vascular congestion, but patient is also noted to have extensive basilar consolidation concerning for pneumonia. There are bilateral pleural effusions. No pneumothorax is seen. Visualized bowel gas pattern is unremarkable.       1. Tubes and lines terminate in satisfactory position. 2. Bibasilar consolidation, concerning for pneumonia. Short-term follow-up  exam is recommended.  This report was finalized on 1/30/2019 1:58 AM by Dr. Nadia Baez M.D.      Xr Abdomen Kub    Result Date: 1/30/2019  PORTABLE CHEST RADIOGRAPH; KUB  HISTORY: Intubation and nasogastric tube placement  COMPARISON: None available.  FINDINGS: Endotracheal tubes terminates in satisfactory position. Patient also appears to be nasogastric tube which extends into the body of the stomach. Cardiomegaly is identified. There is some vascular congestion, but patient is also noted to have extensive basilar consolidation concerning for pneumonia. There are bilateral pleural effusions. No pneumothorax is seen. Visualized bowel gas pattern is unremarkable.       1. Tubes and lines terminate in satisfactory position. 2. Bibasilar consolidation, concerning for pneumonia. Short-term follow-up exam is recommended.  This report was finalized on 1/30/2019 1:58 AM by Dr. Nadia Baez M.D.      Results for orders placed during the hospital encounter of 10/24/18   Adult Transthoracic Echo Complete W/ Cont if Necessary Per Protocol    Narrative · Left ventricular systolic function is hyperdynamic (EF > 70%).   Calculated EF = 71%. Estimated EF was in agreement with the calculated EF.   Normal left ventricular cavity size noted. All left ventricular wall   segments contract normally. Left ventricular wall thickness is consistent   with mild concentric hypertrophy. Left ventricular diastolic dysfunction   is noted (grade I) consistent with impaired relaxation.  · Normal right ventricular wall thickness and systolic function noted.   Right ventricular cavity is moderately dilated. Abnormal septal motion.   Systolic flattening of interventricular septum consistent with right   ventricle pressure overload.  · Right atrial cavity size is moderately dilated  · The aortic valve is abnormal in structure. There is thickening of the   aortic valve.  · Physiologic tricuspid valve regurgitation is present. Calculated  right   ventricular systolic pressure from tricuspid regurgitation is 21 mmHg.   Insufficient TR velocity profile to estimate the right ventricular   systolic pressure. This is an incomplete jet of tricuspid regurgitation   and cannot assess RV systolic pressure adequately/accurately based on the   signal. From the 2-D images and findings of systolic flattening of the   ventricular septum, there is likely significant pulmonary hypertension.  · Patient was contacted to return for additional imaging but absolutely   refused to do so.              Active Hospital Problems    Diagnosis Date Noted   • **Oropharyngeal dysphagia [R13.12] 01/24/2019   • Acute respiratory failure with hypercapnia (CMS/[facility]) [J96.02] 01/30/2019   • Laryngeal cancer (CMS/HCC) [C32.9] 01/29/2019      Resolved Hospital Problems   No resolved problems to display.         Assessment/Plan     1. 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  2. Dysphagia PEG tube placed earlier today Supposed to hold on tube feeds for 24 hours we'll order for tomorrow make sure he tolerates before he can go home  3. Acute hypoxemic and hypercapnic respiratory failure Much improved, edema resolved .  Dr. Lemon yesterday rapid wean steroids okay will drop to 20 mg twice a day for a day and then discontinue.  Respiratory failure has resolved he is actually eating well on room air  4. Possible pneumonia day 5 of antibiotics and I'm going check a follow-up chest x-ray in the morning we may be able to DC antibiotics or change to by mouth if he is ready for discharge to complete course.  5. Hyperglycemia steroid induced on sliding scale insulin   6. Hyperkalemia resolved now hypokalemic replace carefully    Plan for disposition: Home soon?    Hussein Dubon MD  02/03/19  5:34 PM    Time:

## 2019-02-03 NOTE — PLAN OF CARE
Problem: Patient Care Overview  Goal: Plan of Care Review   02/03/19 1520   Coping/Psychosocial   Plan of Care Reviewed With patient   OTHER   Outcome Summary Pt admitted 1/29 w/ recurrent laryngeal CA, also s/p PEG. Pt reports living w/ family, occasional use of cane, no falls, and bedroom is upstairs. Today pt demonstrates generalized weakness, impaired standing balance and gait pattern. Able to ambulate 15' w/ min A using RW, unsteady, shuffling gait with some leaning to R. Currently recommend DC to SNU - plan to update as pt progresses.

## 2019-02-03 NOTE — PROGRESS NOTES
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 Airway very stable at this point. Treatment plan per oncology. I think he can have an oral diet as tolerated while tube feeds are advanced. Will ask to transition care to A at this point.

## 2019-02-03 NOTE — CONSULTS
Inpatient Internal Medicine Consult  Consult performed by: Alan Silva MD  Consult ordered by: Michael Bhagat MD  Reason for consult: assume care, medical mgmt          Patient Care Team:  Erinn Howard MD as PCP - General (Internal Medicine)  Erinn Howard MD as PCP - Family Medicine  Nida Meyers MD as Consulting Physician (Radiation Oncology)    Chief complaint: hungry    Subjective     Very pleasant 79yo gentleman with recurrent laryngeal CA who suffered respiratory failure after biopsy procedure requiring ICU admission. Thought to be due to laryngeal edema and vocal cord paralysis. He is now extubated and transferred out of ICU to floor for continued care and initiation of tube feeds for nutritional support. He has some hoarseness that is chronic. No choking or cough. Regular diet has been okayed by ENT. He does have an aspiration PNA diagnosed in ICU and is tolerating treatment with Zosyn.         Review of Systems   Constitutional: Positive for fatigue. Negative for appetite change, chills, diaphoresis and fever.   HENT: Positive for voice change. Negative for congestion, ear pain, facial swelling, hearing loss, mouth sores, nosebleeds, rhinorrhea, sinus pressure, sore throat and trouble swallowing.    Eyes: Negative for pain and visual disturbance.   Respiratory: Negative for cough, choking, chest tightness, shortness of breath, wheezing and stridor.    Cardiovascular: Negative for chest pain, palpitations and leg swelling.   Gastrointestinal: Negative for abdominal pain, blood in stool, diarrhea, nausea and vomiting.   Endocrine: Negative for cold intolerance and heat intolerance.   Genitourinary: Negative for decreased urine volume, difficulty urinating, dysuria and hematuria.   Musculoskeletal: Negative for back pain and neck pain.   Skin: Negative for color change and pallor.   Allergic/Immunologic: Negative for environmental allergies and food allergies.   Neurological: Negative for  tremors, seizures, syncope, facial asymmetry, speech difficulty, numbness and headaches.   Hematological: Negative for adenopathy. Does not bruise/bleed easily.   Psychiatric/Behavioral: Negative for behavioral problems, confusion and hallucinations.        Past Medical History:   Diagnosis Date   • Abdominal cramping     AT TIMES   • Anxiety    • Cellulitis     BILATERAL LOWER EXTREMITIES   • Chronic cough    • COPD (chronic obstructive pulmonary disease) (CMS/HCC)    • Dysphasia    • Edema, lower extremity    • Enlarged heart     PER PATIENT?? STATES SAW CARDIOLOGIST YRS AGO BUT DOES NOT REMEMBER NAME    • History of laryngeal cancer     HISTORY OF LARYNGECTOMY, RADIATION   • Hyperlipidemia    • Hypertension    • Hypogonadism male    • Type 2 diabetes mellitus (CMS/[facility])    • Vocal cord paralysis    ,   Past Surgical History:   Procedure Laterality Date   • CHOLECYSTECTOMY     • LARYNGOSCOPY N/A 2019    Procedure: DIRECT SUSPENSION MICROLARYNGOSCOPY BIOPSY AND POSSIBLE CERVICE ESOPHAGOSCOPY;  Surgeon: Michael Bhagat MD;  Location: [facility];  Service: ENT   • PROSTATECTOMY     • TOTAL LARYNGECTOMY     ,   Family History   Problem Relation Age of Onset   • Cancer Other    • Heart disease Other    • Malig Hyperthermia Neg Hx    ,   Social History     Tobacco Use   • Smoking status: Former Smoker     Packs/day: 1.50     Years: 50.00     Pack years: 75.00     Types: Cigarettes     Last attempt to quit: 3/8/2004     Years since quittin.9   • Smokeless tobacco: Never Used   Substance Use Topics   • Alcohol use: Yes     Comment: SOCIAL   • Drug use: No   ,   Medications Prior to Admission   Medication Sig Dispense Refill Last Dose   • albuterol sulfate  (90 Base) MCG/ACT inhaler Inhale 2 puffs Every 4 (Four) Hours As Needed for Wheezing.   2019 at 0300   • amoxicillin (AMOXIL) 500 MG capsule Take 1,000 mg by mouth 2 (Two) Times a Day. ALTERNATES WEEKS WITH CIPRO   2019 at Unknown  time   • aspirin 81 MG tablet Take 81 mg by mouth Daily. HELD FOR OR   Past Week at Unknown time   • carvedilol (COREG) 3.125 MG tablet Take 3.125 mg by mouth 2 (Two) Times a Day With Meals.   1/29/2019 at 0300   • ciprofloxacin (CIPRO) 500 MG tablet Take 500 mg by mouth 2 (Two) Times a Day. ALTERNATES WEEKS WITH AMOXICILLIN   1/26/2019 at Unknown time   • furosemide (LASIX) 20 MG tablet Take 20 mg by mouth Daily.   1/28/2019 at 0800   • glimepiride (AMARYL) 4 MG tablet Take 4 mg by mouth Every Morning Before Breakfast.   1/28/2019 at Unknown time   • glimepiride (AMARYL) 4 MG tablet Take 8 mg by mouth Every Evening.   1/28/2019 at Unknown time   • hydrochlorothiazide (HYDRODIURIL) 25 MG tablet Take 25 mg by mouth Daily.   1/28/2019 at 0800   • ipratropium-albuterol (DUO-NEB) 0.5-2.5 mg/3 ml nebulizer Take 3 mL by nebulization Every 4 (Four) Hours As Needed for Wheezing.      • lidocaine-prilocaine (EMLA) cream Apply  topically to the appropriate area as directed As Needed.   1/29/2019 at Unknown time   • lisinopril (PRINIVIL,ZESTRIL) 20 MG tablet Take 20 mg by mouth Daily.   1/28/2019 at 0800   • metFORMIN (GLUCOPHAGE) 1000 MG tablet Take 1,000 mg by mouth 2 (Two) Times a Day.   1/28/2019 at Unknown time   • montelukast (SINGULAIR) 10 MG tablet Take 10 mg by mouth Every Night.      • umeclidinium-vilanterol (ANORO ELLIPTA) 62.5-25 MCG/INH aerosol powder  inhaler Inhale 1 puff Daily.   1/29/2019 at Unknown time   • clotrimazole-betamethasone (LOTRISONE) cream Apply  topically to the appropriate area as directed 2 (Two) Times a Day.      • diphenoxylate-atropine (LOMOTIL) 2.5-0.025 MG per tablet Take 1 tablet by mouth 4 (Four) Times a Day As Needed for Diarrhea.   Unknown at Unknown time   • econazole nitrate (SPECTAZOLE) 1 % cream Apply topically.      • fluticasone (FLONASE) 50 MCG/ACT nasal spray 2 sprays into the nostril(s) as directed by provider 2 (Two) Times a Day.   Unknown at Unknown time   • JANUVIA 50 MG  tablet Take 50 mg by mouth Daily. CURRENTLY NOT TAKING D/T COST  0 Unknown at Unknown time   • WELCHOL 625 MG tablet Take 1,250 mg by mouth 2 (Two) Times a Day With Meals. CURRENTLY NOT TAKING D/T COST  0 Unknown at Unknown time   , Scheduled Meds:      aspirin 81 mg Oral Daily   carvedilol 3.125 mg Per G Tube Q12H   enoxaparin 40 mg Subcutaneous Q24H   famotidine 20 mg Intravenous Q12H   insulin lispro 0-14 Units Subcutaneous Q4H   [START ON 2/4/2019] methylPREDNISolone sodium succinate 20 mg Intravenous Q24H   piperacillin-tazobactam 3.375 g Intravenous Q8H   , PRN Meds:  •  albuterol  •  dextrose  •  dextrose  •  enalaprilat  •  glucagon (human recombinant)  •  lactated ringers  •  [DISCONTINUED] Morphine **AND** naloxone  •  ondansetron  •  Pharmacy to Dose Zosyn  •  potassium chloride  •  promethazine **OR** promethazine **OR** promethazine **OR** promethazine  •  racemic epinephrine and Allergies:  Patient has no known allergies.    Objective      Vital Signs  Temp:  [97.5 °F (36.4 °C)-100.5 °F (38.1 °C)] 97.5 °F (36.4 °C)  Heart Rate:  [73-89] 89  Resp:  [16] 16  BP: (129-147)/(69-78) 135/69    Physical Exam   Constitutional: He is oriented to person, place, and time. He appears well-developed and well-nourished. No distress.   HENT:   Head: Normocephalic and atraumatic.   Mouth/Throat: No oropharyngeal exudate.   Some bruising of soft palate   Eyes: Conjunctivae and EOM are normal. Pupils are equal, round, and reactive to light. Right eye exhibits no discharge. Left eye exhibits no discharge. No scleral icterus.   Neck: Normal range of motion. Neck supple. No JVD present. No tracheal deviation present. No thyromegaly present.   Cardiovascular: Normal rate, regular rhythm, normal heart sounds and intact distal pulses.   No murmur heard.  Pulmonary/Chest: Effort normal and breath sounds normal. No respiratory distress.   Abdominal: Soft. Bowel sounds are normal. He exhibits no distension. There is no  tenderness.   Musculoskeletal: Normal range of motion. He exhibits no edema or deformity.   Lymphadenopathy:     He has no cervical adenopathy.   Neurological: He is alert and oriented to person, place, and time. No cranial nerve deficit or sensory deficit.   Skin: Skin is warm and dry. Capillary refill takes less than 2 seconds. He is not diaphoretic. No erythema. No pallor.   Psychiatric: He has a normal mood and affect. His behavior is normal.   Nursing note and vitals reviewed.      Results Review:    I reviewed the patient's new clinical results.  I reviewed the patient's new imaging results and agree with the interpretation.  I reviewed the patient's other test results and agree with the interpretation  I personally viewed and interpreted the patient's EKG/Telemetry data  Discussed with patient and RN        Assessment/Plan       Acute respiratory failure with hypoxia and hypercapnia (CMS/HCC)    Laryngeal cancer (CMS/HCC)    Oropharyngeal dysphagia    COPD (chronic obstructive pulmonary disease) (CMS/HCC)    Hypertension    Type 2 diabetes mellitus (CMS/HCC)    Vocal cord paralysis    Aspiration pneumonia of both lower lobes (healthcare associated)    Hypokalemia    Decubitus ulcer of coccyx, unspecified pressure ulcer stage          Plan:   (Thanks for consult  Chart reviewed  77yo gentleman with recurrent laryngeal CA who suffered respiratory failure after biopsy procedure requiring ICU admission  He is currently followed by ENT, Pulm, and Heme/Onc  He declined trach despite the recommendations of all consultants  He has consented to chemotx at later date  PEG placed for nutrition support, ENT has okayed po intake as tolerated as well  TFs to start tonight  Has an aspiration PNA diagnosed in ICU, continue Zosyn, change to Augmentin at discharge  Currently on IV Solumedrol, okay to taper per ENT, will change to medrol dose pack, 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       I discussed the patients findings and my recommendations with patient and nursing staff    Aaln Silva MD  02/03/19  6:13 PM    Time: 45min

## 2019-02-03 NOTE — THERAPY EVALUATION
Acute Care - Physical Therapy Initial Evaluation  [facility]     Patient Name: Rosalio Rene  : 1940  MRN: [id number]  Today's Date: 2/3/2019   Onset of Illness/Injury or Date of Surgery: 19            Admit Date: 2019    Visit Dx:     ICD-10-CM ICD-9-CM   1. Acute respiratory failure with hypercapnia (CMS/HCC) J96.02 518.81   2. Laryngeal cancer (CMS/HCC) C32.9 161.9   3. 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 STATES SAW CARDIOLOGIST YRS AGO BUT DOES NOT REMEMBER NAME    • History of laryngeal cancer     HISTORY OF LARYNGECTOMY, RADIATION   • Hyperlipidemia    • Hypertension    • Hypogonadism male    • Type 2 diabetes mellitus (CMS/HCC)    • Vocal cord paralysis      Past Surgical History:   Procedure Laterality Date   • CHOLECYSTECTOMY     • LARYNGOSCOPY N/A 2019    Procedure: DIRECT SUSPENSION MICROLARYNGOSCOPY BIOPSY AND POSSIBLE CERVICE ESOPHAGOSCOPY;  Surgeon: Michael Bhagat MD;  Location: [facility] OR;  Service: ENT   • PROSTATECTOMY     • TOTAL LARYNGECTOMY          PT ASSESSMENT (last 12 hours)      Physical Therapy Evaluation     Row Name 19 1514 19 1500       PT Evaluation Time/Intention    Subjective Information   no complaints  -AR  --    Document Type  evaluation  -AR  --  -AR    Mode of Treatment  physical therapy  -AR  --    Patient Effort  good  -AR  --    Symptoms Noted During/After Treatment  fatigue  -AR  --    Row Name 02/03/19 1514          General Information    Patient Profile Reviewed?  yes  -AR     Onset of Illness/Injury or Date of Surgery  01/29/19  -AR     Prior Level of Function  independent:  -AR     Equipment Currently Used at Home  cane, straight sometimes used cane, no falls  -AR     Pertinent History of Current Functional Problem  recurrent laryngeal CA  -AR     Existing Precautions/Restrictions  fall  -AR     Barriers to Rehab  none identified  -AR     Row Name 02/03/19 1514          Relationship/Environment    Lives With  other relative(s)  -AR     Row Name 02/03/19 1514          Resource/Environmental Concerns    Current Living Arrangements  home/apartment/condo  -AR     Row Name 02/03/19 1514          Cognitive Assessment/Intervention- PT/OT    Orientation Status (Cognition)  oriented x 4  -AR     Follows Commands (Cognition)  WNL  -AR     Safety Deficit (Cognitive)  mild deficit  -AR     Row Name 02/03/19 1514          Bed Mobility Assessment/Treatment    Bed Mobility Assessment/Treatment  sit-supine;supine-sit  -AR     Supine-Sit [place] (Bed Mobility)  minimum assist (75% patient effort)  -AR     Sit-Supine [place] (Bed Mobility)  not tested  -AR     Assistive Device (Bed Mobility)  bed rails;head of bed elevated  -AR     Row Name 02/03/19 1514          Transfer Assessment/Treatment    Transfer Assessment/Treatment  sit-stand transfer;stand-sit transfer  -AR     Sit-Stand [place] (Transfers)  minimum assist (75% patient effort);2 person assist  -AR     Stand-Sit [place] (Transfers)  minimum assist (75% patient effort);contact guard;2 person assist  -AR     Row Name 02/03/19 1514          Sit-Stand Transfer    Assistive Device (Sit-Stand Transfers)  walker, front-wheeled  -AR      Row Name 02/03/19 1514          Gait/Stairs Assessment/Training    Gait/Stairs Assessment/Training  gait pattern;distance ambulated  -AR     [place] Level (Gait)  contact guard;minimum assist (75% patient effort);2 person assist  -AR     Assistive Device (Gait)  walker, front-wheeled  -AR     Distance in Feet (Gait)  15  -AR     Pattern (Gait)  swing-to  -AR     Deviations/Abnormal Patterns (Gait)  gait speed decreased;festinating/shuffling;bilateral deviations  -AR     Bilateral Gait Deviations  heel strike decreased;forward flexed posture  -AR     Comment (Gait/Stairs)  unsteady, leaning to R, limited by fatigue  -AR     Row Name 02/03/19 1514          General ROM    GENERAL ROM COMMENTS  B LE WFL  -AR     Row Name 02/03/19 1514          MMT (Manual Muscle Testing)    General MMT Comments  B LE -4/5  -AR     Row Name             Wound 01/30/19 0800 Left gluteal pressure injury    Wound - Properties Group Date first assessed: 01/30/19  -MS Time first assessed: 0800  -MS Present On Admission : yes  -MS Side: Left  -MS Location: gluteal  -MS Type: pressure injury  -MS Stage, Pressure Injury: Stage 2  -MS    Row Name             Wound 01/30/19 0800 Right gluteal pressure injury    Wound - Properties Group Date first assessed: 01/30/19  -MS Time first assessed: 0800  -MS Present On Admission : yes  -MS Side: Right  -MS Location: gluteal  -MS Type: pressure injury  -MS Stage, Pressure Injury: Stage 2  -MS    Row Name             Wound 01/30/19 0800 Bilateral gluteal pressure injury    Wound - Properties Group Date first assessed: 01/30/19  -MS Time first assessed: 0800  -MS Present On Admission : yes  -MS Side: Bilateral  -MS Location: gluteal  -MS Type: pressure injury  -MS Stage, Pressure Injury: deep tissue injury  -MS    Row Name             Wound 02/02/19 1013 Other (See comments) abdomen incision    Wound - Properties Group Date first assessed: 02/02/19  - Time first assessed: 1013  -MC Side: Other (See  comments)  - Location: abdomen  - Type: incision  -    Row Name 02/03/19 1514          Physical Therapy Clinical Impression    Patient/Family Goals Statement (PT Clinical Impression)  DC home  -AR     Criteria for Skilled Interventions Met (PT Clinical Impression)  yes  -AR     Pathology/Pathophysiology Noted (Describe Specifically for Each System)  musculoskeletal  -AR     Impairments Found (describe specific impairments)  aerobic capacity/endurance;gait, locomotion, and balance  -AR     Rehab Potential (PT Clinical Summary)  good, to achieve stated therapy goals  -AR     Row Name 02/03/19 1514          Vital Signs    O2 Delivery Pre Treatment  room air  -AR     Row Name 02/03/19 1514          Physical Therapy Goals    Bed Mobility Goal Selection (PT)  bed mobility, PT goal 1  -AR     Transfer Goal Selection (PT)  transfer, PT goal 1  -AR     Gait Training Goal Selection (PT)  gait training, PT goal 1  -AR     Row Name 02/03/19 1514          Bed Mobility Goal 1 (PT)    Activity/Assistive Device (Bed Mobility Goal 1, PT)  sit to supine/supine to sit  -AR     [place] Level/Cues Needed (Bed Mobility Goal 1, PT)  supervision required  -AR     Time Frame (Bed Mobility Goal 1, PT)  1 week  -AR     Row Name 02/03/19 1514          Transfer Goal 1 (PT)    Activity/Assistive Device (Transfer Goal 1, PT)  sit-to-stand/stand-to-sit;walker, rolling  -AR     [place] Level/Cues Needed (Transfer Goal 1, PT)  contact guard assist  -AR     Time Frame (Transfer Goal 1, PT)  1 week  -AR     Row Name 02/03/19 1514          Gait Training Goal 1 (PT)    Activity/Assistive Device (Gait Training Goal 1, PT)  walker, rolling  -AR     [place] Level (Gait Training Goal 1, PT)  standby assist  -AR     Distance (Gait Goal 1, PT)  200  -AR     Time Frame (Gait Training Goal 1, PT)  1 week  -AR     Row Name 02/03/19 1514          Positioning and Restraints    Pre-Treatment Position  in bed no alarm on arrival  -AR     Post  Treatment Position  chair RN reports no need for alarm  -AR     In Chair  sitting;call light within reach;encouraged to call for assist  -AR     Row Name 02/03/19 1514          Living Environment    Home Accessibility  -- bedroom upstairs  -AR       User Key  (r) = Recorded By, (t) = Taken By, (c) = Cosigned By    Initials Name Provider Type    AR Alyse Paul, PT Physical Therapist    Arina Hill, RN Registered Nurse    Lucinda Burr RN Registered Nurse        Physical Therapy Education     Title: PT OT SLP Therapies (In Progress)     Topic: Physical Therapy (In Progress)     Point: Mobility training (In Progress)     Learning Progress Summary           Patient Acceptance, E, NR by AR at 2/3/2019  3:20 PM                   Point: Home exercise program (In Progress)     Learning Progress Summary           Patient Acceptance, E, NR by AR at 2/3/2019  3:20 PM                   Point: Body mechanics (In Progress)     Learning Progress Summary           Patient Acceptance, E, NR by AR at 2/3/2019  3:20 PM                   Point: Precautions (In Progress)     Learning Progress Summary           Patient Acceptance, E, NR by AR at 2/3/2019  3:20 PM                               User Key     Initials Effective Dates Name Provider Type Discipline    AR 04/03/18 -  Alyse Paul, PT Physical Therapist PT              PT Recommendation and Plan  Anticipated Discharge Disposition (PT): skilled nursing facility(Currently rec. DC to SNU)  Planned Therapy Interventions (PT Eval): balance training, bed mobility training, gait training, home exercise program, patient/family education, ROM (range of motion), stair training, strengthening, transfer training  Therapy Frequency (PT Clinical Impression): daily  Outcome Summary/Treatment Plan (PT)  Anticipated Equipment Needs at Discharge (PT): (TBD pending DC Plan)  Anticipated Discharge Disposition (PT): skilled nursing facility(Currently rec. DC to SNU)  Plan of  Care Reviewed With: patient  Outcome Summary: Pt admitted 1/29 w/ recurrent laryngeal CA, also s/p PEG.  Pt reports living w/ family, occasional use of cane, no falls, and bedroom is upstairs. Today pt demonstrates generalized weakness, impaired standing balance and gait pattern.  Able to ambulate 15' w/ min A using RW, unsteady, shuffling gait with some leaning to R.  Currently recommend DC to SNU - plan to update as pt progresses.      Time Calculation:   PT Charges     Row Name 02/03/19 1522             Time Calculation    Start Time  1432  -AR      Stop Time  1453  -AR      Time Calculation (min)  21 min  -AR      PT Received On  02/03/19  -AR      PT - Next Appointment  02/04/19  -AR      PT Goal Re-Cert Due Date  02/10/19  -AR        User Key  (r) = Recorded By, (t) = Taken By, (c) = Cosigned By    Initials Name Provider Type    AR Alyse Paul, PT Physical Therapist        Therapy Suggested Charges     Code   Minutes Charges    None           Therapy Charges for Today     Code Description Service Date Service Provider Modifiers Qty    02850920909 HC PT EVAL MOD COMPLEXITY 2 2/3/2019 Alyse Paul, PT GP 1    67185406461 HC PT THER PROC EA 15 MIN 2/3/2019 Alyse Paul, PT GP 1    36850961406 HC PT THER SUPP EA 15 MIN 2/3/2019 Alyse Paul, PT GP 1                 Alyse Paul PT  2/3/2019

## 2019-02-04 NOTE — THERAPY TREATMENT NOTE
Acute Care - Physical Therapy Treatment Note  [facility]     Patient Name: Rosalio Rene  : 1940  MRN: [id number]  Today's Date: 2019  Onset of Illness/Injury or Date of Surgery: 19          Admit Date: 2019    Visit Dx:    ICD-10-CM ICD-9-CM   1. Acute respiratory failure with hypercapnia (CMS/HCC) J96.02 518.81   2. Laryngeal cancer (CMS/HCC) C32.9 161.9   3. 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)   • Laryngeal cancer (CMS/HCC)   • Acute respiratory failure with hypercapnia (CMS/[facility])   • Oropharyngeal dysphagia   • COPD (chronic obstructive pulmonary disease) (CMS/[facility])   • Hypertension   • Type 2 diabetes mellitus (CMS/HCC)   • Vocal cord paralysis   • Aspiration pneumonia of both lower lobes (healthcare associated)   • Acute respiratory failure with hypoxia and hypercapnia (CMS/HCC)   • Hypokalemia   • Decubitus ulcer of coccyx, unspecified pressure ulcer stage       Therapy Treatment    Rehabilitation Treatment Summary     Row Name 19 1209             Treatment Time/Intention    Discipline  physical therapist  -CA      Document Type  therapy note (daily note)  -CA      Subjective Information  no complaints  -CA      Mode of Treatment  physical therapy  -CA      Patient Effort  good  -CA      Existing Precautions/Restrictions  fall;NPO  -CA      Recorded by [CA] Carina Stahl, PT 19 1210      Row Name 19 1209             Cognitive Assessment/Intervention- PT/OT    Orientation Status (Cognition)  oriented x 4  -CA      Follows Commands (Cognition)  WNL  -CA      Recorded by [CA] Carina Stahl, PT 19 1210      Row Name  02/04/19 1209             Bed Mobility Assessment/Treatment    Supine-Sit [place] (Bed Mobility)  not tested  -CA      Sit-Supine [place] (Bed Mobility)  not tested  -CA      Recorded by [CA] Carina Stahl, PT 02/04/19 1210      Row Name 02/04/19 1209             Transfer Assessment/Treatment    Transfer Assessment/Treatment  stand-sit transfer;sit-stand transfer  -CA      Recorded by [CA] Carina Stahl, PT 02/04/19 1210      Row Name 02/04/19 1209             Sit-Stand Transfer    Sit-Stand [place] (Transfers)  moderate assist (50% patient effort);2 person assist  -CA      Assistive Device (Sit-Stand Transfers)  walker, front-wheeled  -CA      Recorded by [CA] Carina Stahl, PT 02/04/19 1210      Row Name 02/04/19 1209             Stand-Sit Transfer    Stand-Sit [place] (Transfers)  minimum assist (75% patient effort)  -CA      Assistive Device (Stand-Sit Transfers)  walker, front-wheeled  -CA      Recorded by [CA] Carina Stahl, PT 02/04/19 1210      Row Name 02/04/19 1209             Gait/Stairs Assessment/Training    [place] Level (Gait)  contact guard;minimum assist (75% patient effort);2 person assist  -CA      Assistive Device (Gait)  walker, front-wheeled  -CA      Distance in Feet (Gait)  50  -CA      Pattern (Gait)  swing-to  -CA      Deviations/Abnormal Patterns (Gait)  gait speed decreased;festinating/shuffling;bilateral deviations  -CA      Bilateral Gait Deviations  heel strike decreased;forward flexed posture  -CA      Recorded by [CA] Carina Stahl, PT 02/04/19 1210      Row Name 02/04/19 1209             Motor Skills Assessment/Interventions    Additional Documentation  Therapeutic Exercise (Group)  -CA      Recorded by [CA] Carina Stahl, PT 02/04/19 1211      Row Name 02/04/19 1209             Therapeutic Exercise    Comment (Therapeutic Exercise)  AP, QS, GS, LAQ x 10 ea (B)  -CA      Recorded by [CA] Carina Stahl, PT 02/04/19 1211      Row Name  02/04/19 1209             Positioning and Restraints    Pre-Treatment Position  sitting in chair/recliner  -CA      Post Treatment Position  chair  -CA      In Chair  reclined;call light within reach;encouraged to call for assist;with other staff  -CA      Recorded by [CA] Carina Stahl, YVONNE 02/04/19 1210      Row Name                Wound 01/30/19 0800 Left gluteal pressure injury    Wound - Properties Group Date first assessed: 01/30/19 [MS] Time first assessed: 0800 [MS] Present On Admission : yes [MS] Side: Left [MS] Location: gluteal [MS] Type: pressure injury [MS] Stage, Pressure Injury: Stage 2 [MS2] Recorded by:  [MS] Arina Kirk RN 01/30/19 1523 [MS2] Arina Kirk RN 01/30/19 1524    Row Name                Wound 01/30/19 0800 Right gluteal pressure injury    Wound - Properties Group Date first assessed: 01/30/19 [MS] Time first assessed: 0800 [MS] Present On Admission : yes [MS] Side: Right [MS] Location: gluteal [MS] Type: pressure injury [MS] Stage, Pressure Injury: Stage 2 [MS] Recorded by:  [MS] Arina Kirk RN 01/30/19 1524    Row Name                Wound 01/30/19 0800 Bilateral gluteal pressure injury    Wound - Properties Group Date first assessed: 01/30/19 [MS] Time first assessed: 0800 [MS] Present On Admission : yes [MS] Side: Bilateral [MS] Location: gluteal [MS] Type: pressure injury [MS] Stage, Pressure Injury: deep tissue injury [MS] Recorded by:  [MS] Arina Kirk RN 01/30/19 1525    Row Name                Wound 02/02/19 1013 Other (See comments) abdomen incision    Wound - Properties Group Date first assessed: 02/02/19 [MC] Time first assessed: 1013 [MC] Side: Other (See comments) [MC] Location: abdomen [MC] Type: incision [MC] Recorded by:  [DELONTE] Lucinda Madrigal RN 02/02/19 1013      User Key  (r) = Recorded By, (t) = Taken By, (c) = Cosigned By    Initials Name Effective Dates Discipline    Arina Hill RN 06/16/16 -  Nurse    Lucinda Burr RN 02/23/18 -  Nurse     Carina Seaman, PT 06/13/18 -  PT          Wound 01/30/19 0800 Left gluteal pressure injury (Active)   Dressing Appearance dry;intact 2/4/2019  8:01 AM   Closure None 2/4/2019  8:01 AM   Base dressing in place, unable to visualize 2/4/2019  8:01 AM   Periwound intact 2/4/2019  8:01 AM   Periwound Temperature warm 2/4/2019  8:01 AM   Periwound Skin Turgor soft 2/4/2019  8:01 AM   Drainage Amount none 2/4/2019  8:01 AM       Wound 01/30/19 0800 Right gluteal pressure injury (Active)   Dressing Appearance dry;intact 2/4/2019  8:01 AM   Closure JILLIAN 2/4/2019  8:01 AM   Periwound dry 2/4/2019  8:01 AM   Periwound Temperature warm 2/4/2019  8:01 AM   Periwound Skin Turgor soft 2/4/2019  8:01 AM   Drainage Amount none 2/4/2019  8:01 AM       Wound 01/30/19 0800 Bilateral gluteal pressure injury (Active)   Dressing Appearance dry;intact 2/4/2019  8:01 AM   Closure JILLIAN 2/4/2019  8:01 AM   Base pink;purple 2/4/2019  8:01 AM   Periwound intact 2/4/2019  8:01 AM   Periwound Temperature warm 2/4/2019  8:01 AM   Periwound Skin Turgor soft 2/4/2019  8:01 AM   Drainage Amount none 2/4/2019  8:01 AM       Wound 02/02/19 1013 Other (See comments) abdomen incision (Active)   Dressing Appearance dry;intact 2/4/2019  8:01 AM   Closure JILLIAN 2/3/2019  8:30 PM   Drainage Amount none 2/4/2019  8:01 AM           Physical Therapy Education     Title: PT OT SLP Therapies (In Progress)     Topic: Physical Therapy (In Progress)     Point: Mobility training (In Progress)     Learning Progress Summary           Patient Acceptance, E, NR by CA at 2/4/2019 12:10 PM    Acceptance, E, NR by AR at 2/3/2019  3:20 PM                   Point: Home exercise program (In Progress)     Learning Progress Summary           Patient Acceptance, E, NR by CA at 2/4/2019 12:10 PM    Acceptance, E, NR by AR at 2/3/2019  3:20 PM                   Point: Body mechanics (In Progress)     Learning Progress Summary           Patient Acceptance, E, NR by CA at 2/4/2019  12:10 PM    Acceptance, E, NR by AR at 2/3/2019  3:20 PM                   Point: Precautions (In Progress)     Learning Progress Summary           Patient Acceptance, E, NR by CA at 2/4/2019 12:10 PM    Acceptance, E, NR by AR at 2/3/2019  3:20 PM                               User Key     Initials Effective Dates Name Provider Type Discipline    AR 04/03/18 -  Alyse Paul, PT Physical Therapist PT    CA 06/13/18 -  Carina Stahl, YVONNE Physical Therapist PT                PT Recommendation and Plan     Plan of Care Reviewed With: patient  Progress: improving  Outcome Summary: Pt demos good progress to PT goals, including improved functional strength and mobility, amb 50 ft with CGa to min A x 2 with FWW. Cont to recommend SNF at [place] at this time.  Outcome Measures     Row Name 02/04/19 1200             How much help from another person do you currently need...    Turning from your back to your side while in flat bed without using bedrails?  3  -CA      Moving from lying on back to sitting on the side of a flat bed without bedrails?  2  -CA      Moving to and from a bed to a chair (including a wheelchair)?  2  -CA      Standing up from a chair using your arms (e.g., wheelchair, bedside chair)?  2  -CA      Climbing 3-5 steps with a railing?  1  -CA      To walk in hospital room?  3  -CA      AM-PAC 6 Clicks Score  13  -CA         Functional Assessment    Outcome Measure Options  AM-PAC 6 Clicks Basic Mobility (PT)  -CA        User Key  (r) = Recorded By, (t) = Taken By, (c) = Cosigned By    Initials Name Provider Type    CA Carina Stahl, YVONNE Physical Therapist         Time Calculation:   PT Charges     Row Name 02/04/19 1212             Time Calculation    Start Time  1146  -CA      Stop Time  1205  -CA      Time Calculation (min)  19 min  -CA      PT Received On  02/04/19  -CA      PT - Next Appointment  02/05/19  -CA         Time Calculation- PT    Total Timed Code Minutes- PT  19 minute(s)  -CA         User Key  (r) = Recorded By, (t) = Taken By, (c) = Cosigned By    Initials Name Provider Type    CA Carina Stahl, PT Physical Therapist        Therapy Suggested Charges     Code   Minutes Charges    None           Therapy Charges for Today     Code Description Service Date Service Provider Modifiers Qty    66035239354 HC PT THER PROC EA 15 MIN 2/4/2019 Carina Stahl, PT GP 1          PT G-Codes  Outcome Measure Options: AM-PAC 6 Clicks Basic Mobility (PT)  AM-PAC 6 Clicks Score: 13    Carina Stahl PT  2/4/2019

## 2019-02-04 NOTE — PLAN OF CARE
Problem: Patient Care Overview  Goal: Plan of Care Review  Outcome: Ongoing (interventions implemented as appropriate)   02/04/19 0609   Coping/Psychosocial   Plan of Care Reviewed With patient   Plan of Care Review   Progress no change   OTHER   Outcome Summary Tolerating bolus tube feeds tonight. Last bolus was 360 cc over 1 hour. Small BM at [facility]. Up with assist only. Condom cath in place. Replaced x 2 tonight. [facility]. Doesn't act like he wants to go. Takes pills fine. Denies pain       Problem: Fall Risk (Adult)  Goal: Absence of Fall  Outcome: Ongoing (interventions implemented as appropriate)      Problem: Skin Injury Risk (Adult)  Goal: Skin Health and Integrity  Outcome: Ongoing (interventions implemented as appropriate)      Problem: Anxiety (Adult)  Goal: Reduction/Resolution  Outcome: Ongoing (interventions implemented as appropriate)      Problem: Pain, Acute (Adult)  Goal: Acceptable Pain Control/Comfort Level  Outcome: Ongoing (interventions implemented as appropriate)      Problem: Dysphagia (Adult)  Goal: Functional/Safe Swallow  Outcome: Ongoing (interventions implemented as appropriate)      Problem: Nutrition, Enteral (Adult)  Goal: Signs and Symptoms of Listed Potential Problems Will be Absent, Minimized or Managed (Nutrition, Enteral)  Outcome: Ongoing (interventions implemented as appropriate)

## 2019-02-04 NOTE — PROGRESS NOTES
"   LOS: 5 days   Patient Care Team:  Erinn Howard MD as PCP - General (Internal Medicine)  Erinn Howard MD as PCP - Family Medicine  Nida Meyers MD as Consulting Physician (Radiation Oncology)    Chief Complaint: none today    Subjective     Feeling better today. Increased distance ambulated with PT today. Tolerating tube feeds. Voiding freely with condom cath in place. No difficulties with swallowing, tolerating diet so far.         Subjective:  Symptoms:  Stable.  He reports cough and weakness.  No shortness of breath, malaise, chest pain, headache, chest pressure, anorexia, diarrhea or anxiety.    Diet:  Poor intake.  No nausea or vomiting.    Activity level: Impaired due to weakness.    Pain:  He reports no pain.        History taken from: patient chart RN    Objective     Vital Signs  Temp:  [97.1 °F (36.2 °C)-98.6 °F (37 °C)] 97.9 °F (36.6 °C)  Heart Rate:  [67-80] 73  Resp:  [16-18] 18  BP: (130-153)/(68-91) 138/83    Objective:  General Appearance:  Comfortable, in no acute distress and ill-appearing.    Vital signs: (most recent): Blood pressure 124/71, pulse 75, temperature 98.7 °F (37.1 °C), temperature source Oral, resp. rate 18, height 180.3 cm (70.98\"), weight 86 kg (189 lb 9.6 oz), SpO2 95 %.  Vital signs are normal.  No fever.    Output: Producing urine and producing stool.    HEENT: Normal HEENT exam.    Lungs:  Normal effort and normal respiratory rate.  Breath sounds clear to auscultation.  (Anteriorly)  Heart: Normal rate.  Regular rhythm.    Abdomen: Abdomen is soft.  Bowel sounds are normal.   There is no abdominal tenderness.     Extremities: There is venous stasis and dependent edema (chronic, woody).    Pulses: Distal pulses are intact.    Neurological: Patient is alert and oriented to person, place and time.    Pupils:  Pupils are equal, round, and reactive to light.    Skin:  Warm and dry.              Results Review:     I reviewed the patient's new clinical results.  I " reviewed the patient's new imaging results and agree with the interpretation.  I reviewed the patient's other test results and agree with the interpretation  Discussed with patient and RN    Medication Review: reviewed and adjusted    Assessment/Plan       Acute respiratory failure with hypoxia and hypercapnia (CMS/HCC)    Laryngeal cancer (CMS/HCC)    Oropharyngeal dysphagia    COPD (chronic obstructive pulmonary disease) (CMS/HCC)    Hypertension    Type 2 diabetes mellitus (CMS/HCC)    Vocal cord paralysis    Aspiration pneumonia of both lower lobes (healthcare associated)    Hypokalemia    Decubitus ulcer of coccyx, unspecified pressure ulcer stage          Plan:   (77yo gentleman with recurrent laryngeal CA who suffered respiratory failure after biopsy procedure requiring ICU admission  He is currently followed by ENT, Pulm, and Heme/Onc  He has declined trach despite the recommendations of all consultants  He has consented to chemotx at later date  PEG placed for nutrition support, ENT has okayed 'po intake as tolerated' as well  Tolerating TFs so far  Has an aspiration PNA diagnosed in ICU, CXR this AM shows improvement, continue Zosyn, change to Augmentin at discharge  Start IS  Currently on IV Solumedrol, okay to taper per ENT, will change to medrol dose pack in AM tomorrow, 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       Alan Silva MD  02/04/19  4:20 PM    Time: 20min

## 2019-02-04 NOTE — PLAN OF CARE
Problem: Patient Care Overview  Goal: Plan of Care Review   02/04/19 1211   Coping/Psychosocial   Plan of Care Reviewed With patient   Plan of Care Review   Progress improving   OTHER   Outcome Summary Pt demos good progress to PT goals, including improved functional strength and mobility, amb 50 ft with CGa to min A x 2 with FWW. Cont to recommend SNF at DC at this time.

## 2019-02-05 NOTE — PROGRESS NOTES
Name: Rosalio Rene ADMIT: 2019   : 1940  PCP: Erinn Howard MD    MRN: [id number] LOS: 6 days   AGE/SEX: 78 y.o. male  ROOM: [facility]   Subjective   Reports no CP SOA NVD. Catheter in place. PEG in place. Having some peristomal blood reported. Bolus feeding is being tolerated. Was very weak and is working with PT. We can assume attending care from ENT.    Objective   Vital Signs  Temp:  [97.9 °F (36.6 °C)-99.3 °F (37.4 °C)] 99.3 °F (37.4 °C)  Heart Rate:  [69-80] 79  Resp:  [18] 18  BP: (124-132)/(69-75) 130/69  SpO2:  [91 %-95 %] 94 %  on   ;   Device (Oxygen Therapy): room air  Body mass index is 26.46 kg/m².    Physical Exam   Constitutional: He is oriented to person, place, and time. He appears well-developed. No distress.   HENT:   Head: Atraumatic.   Nose: Nose normal.   Eyes: Conjunctivae and EOM are normal.   Neck: Normal range of motion. Neck supple.   Cardiovascular: Normal rate, regular rhythm and intact distal pulses.   Pulmonary/Chest: Effort normal. He has decreased breath sounds. He has no wheezes.   Abdominal: Soft. He exhibits no distension.   PEG in place   Musculoskeletal: He exhibits no edema or tenderness.   Neurological: He is alert and oriented to person, place, and time.   Skin: Skin is warm and dry. He is not diaphoretic.   Psychiatric: He has a normal mood and affect. His behavior is normal.   Nursing note and vitals reviewed.      Results Review:       I reviewed the patient's new clinical results.     I reviewed imaging, agree with interpretation.      Results from last 7 days   Lab Units 19  0637 19  0656 19  0407 19  0539   WBC 10*3/mm3 7.40 7.71 8.01 7.40   HEMOGLOBIN g/dL 11.7* 12.5* 12.3* 12.7*   PLATELETS 10*3/mm3 178 191 194 223     Results from last 7 days   Lab Units 19  0637 19  0656 19  0626 19  0407   SODIUM mmol/L 143 146* 143 144   POTASSIUM mmol/L 3.8 3.3* 3.7 3.0*   CHLORIDE mmol/L 103 105 101 102   CO2  mmol/L 28.7 28.8 28.3 29.7*   BUN mg/dL 23 26* 28* 36*   CREATININE mg/dL 0.88 1.05 1.03 1.03   GLUCOSE mg/dL 273* 228* 117* 100*   Estimated Creatinine Clearance: 84.2 mL/min (by C-G formula based on SCr of 0.88 mg/dL).  Results from last 7 days   Lab Units 02/05/19  0637 02/04/19  0656 02/03/19  0626 02/02/19  0407  01/31/19  0703 01/30/19  0428   CALCIUM mg/dL 8.5* 8.8 8.9 8.7   < > 9.0 8.7   ALBUMIN g/dL 2.90* 3.00*  --   --   --  2.90*  --    MAGNESIUM mg/dL 1.8  --   --   --   --   --  2.0    < > = values in this interval not displayed.         aspirin 81 mg Oral Daily   carvedilol 3.125 mg Per G Tube Q12H   enoxaparin 40 mg Subcutaneous Q24H   famotidine 20 mg Intravenous Q12H   insulin lispro 0-14 Units Subcutaneous Q4H   MethylPREDNISolone 4 mg Oral TID Around Food   [START ON 2/6/2019] MethylPREDNISolone 4 mg Oral 4x Daily Taper   [START ON 2/7/2019] MethylPREDNISolone 4 mg Oral TID Around Food   [START ON 2/8/2019] MethylPREDNISolone 4 mg Oral Before Breakfast   [START ON 2/8/2019] MethylPREDNISolone 4 mg Oral Tonight   [START ON 2/9/2019] MethylPREDNISolone 4 mg Oral Before Breakfast   MethylPREDNISolone 8 mg Oral Tonight   piperacillin-tazobactam 3.375 g Intravenous Q8H       lactated ringers 9 mL/hr Last Rate: 9 mL/hr (02/02/19 0927)   Pharmacy to Dose Zosyn     sodium chloride 50 mL/hr Last Rate: 50 mL/hr (02/04/19 1640)   NPO Diet NPO Except: Ice chips      Assessment/Plan      Active Hospital Problems    Diagnosis Date Noted   • **Acute respiratory failure with hypoxia and hypercapnia (CMS/HCC) [J96.01, J96.02] 02/03/2019   • COPD (chronic obstructive pulmonary disease) (CMS/HCC) [J44.9] 02/03/2019   • Hypertension [I10] 02/03/2019   • Type 2 diabetes mellitus (CMS/[facility]) [E11.9] 02/03/2019   • Vocal cord paralysis [J38.00] 02/03/2019   • Aspiration pneumonia of both lower lobes (healthcare associated) [J69.0] 02/03/2019   • Hypokalemia [E87.6] 02/03/2019   • Decubitus ulcer of coccyx, unspecified  pressure ulcer stage [L89.159] 02/03/2019   • Laryngeal cancer (CMS/HCC) [C32.9] 01/29/2019   • Oropharyngeal dysphagia [R13.12] 01/24/2019      Resolved Hospital Problems   No resolved problems to display.     - Laryngeal Cancer: On steroid taper. Sp PEG. Ok to ADAT while also getting TF. Patient had declined tracheostomy. ENT following. Dr Godoy, Oncology, to see in clinic.  - Aspiration PNA: To complete antibiotic course today. Pulmonology following.  - Acute Hypoxemic/Hypercapnic Respiratory failure: Resolved.  - DM2: SSI. Plan metformin at discharge.  - Urinary Retention: Voiding trial prior to discharge when ambulatory.  - Weakness: Continue PT. SNF recommended. Will consult OT and CCP.  - Disposition: SNF vs /few days.    Reese Fairbanks MD  [facility]  02/05/19  3:25 PM

## 2019-02-05 NOTE — PROGRESS NOTES
Postoperative day 3 status post PEG tube placement    S: Patient had bleeding around the PEG tube this morning.  Otherwise he has been using the PEG tube without any problem.  He denies any abdominal pain.  No bleeding contents from the PEG tube    O:   Vitals:    02/04/19 2010 02/05/19 0453 02/05/19 0756 02/05/19 1632   BP: 132/69 130/75 130/69 145/74   BP Location: Right arm Right arm Left arm Left arm   Patient Position: Lying Lying Sitting Lying   Pulse: 80 69 79 71   Resp: 18 18 18 18   Temp: 98.6 °F (37 °C) 97.9 °F (36.6 °C) 99.3 °F (37.4 °C) 97.8 °F (36.6 °C)   TempSrc: Oral Oral Oral Oral   SpO2: 92% 91% 94% 96%   Weight:       Height:         Alert and oriented ×3, no acute distress  Chest clear  Abdomen soft, nontender and nondistended.  There is bleeding around the PEG tube.  PEG tube is in place.  There is no blood in the stomach    Hemoglobin 11.7 from 12.5    Postoperative day 3 status post PEG tube placement, has been having bleeding around the PEG tube.  I applied pressure to the area and placed a pressure dressing.     - Will check the wound tomorrow to ensure there is no more bleeding  -Continue tube feeds as scheduled    Twin Bobby MD, FACS  General, Minimally Invasive and Endoscopic Surgery  [facility]    [address], [address]  [place], [place], [address]  P: [phone]  F: [phone]

## 2019-02-05 NOTE — THERAPY TREATMENT NOTE
Acute Care - Physical Therapy Treatment Note  [facility]     Patient Name: Rosalio Rene  : 1940  MRN: [id number]  Today's Date: 2019  Onset of Illness/Injury or Date of Surgery: 19          Admit Date: 2019    Visit Dx:    ICD-10-CM ICD-9-CM   1. Acute respiratory failure with hypercapnia (CMS/HCC) J96.02 518.81   2. Laryngeal cancer (CMS/HCC) C32.9 161.9   3. 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)   • Laryngeal cancer (CMS/HCC)   • Acute respiratory failure with hypercapnia (CMS/HCC)   • Oropharyngeal dysphagia   • COPD (chronic obstructive pulmonary disease) (CMS/[facility])   • Hypertension   • Type 2 diabetes mellitus (CMS/HCC)   • Vocal cord paralysis   • Aspiration pneumonia of both lower lobes (healthcare associated)   • Acute respiratory failure with hypoxia and hypercapnia (CMS/HCC)   • Hypokalemia   • Decubitus ulcer of coccyx, unspecified pressure ulcer stage       Therapy Treatment    Rehabilitation Treatment Summary     Row Name 19 1605             Treatment Time/Intention    Discipline  physical therapy assistant  -CW      Document Type  therapy note (daily note)  -CW      Subjective Information  no complaints  -CW      Mode of Treatment  physical therapy  -CW      Therapy Frequency (PT Clinical Impression)  daily  -CW      Patient Effort  good  -CW      Existing Precautions/Restrictions  fall;NPO  -CW      Recorded by [CW] Robi Michel PTA 19 1627      Row Name 19 1605             Vital Signs    O2 Delivery Pre Treatment  room air  -CW      Recorded by [CW] Robi Michel PTA 19 1627      Row Name 19  1605             Cognitive Assessment/Intervention- PT/OT    Orientation Status (Cognition)  oriented x 4  -CW      Follows Commands (Cognition)  WNL  -CW      Safety Deficit (Cognitive)  mild deficit  -CW      Personal Safety Interventions  fall prevention program maintained;gait belt;muscle strengthening facilitated;nonskid shoes/slippers when out of bed  -CW      Recorded by [CW] Robi Michel, PTA 02/05/19 1627      Row Name 02/05/19 1605             Bed Mobility Assessment/Treatment    Bed Mobility Assessment/Treatment  supine-sit;sit-supine  -CW      Supine-Sit [place] (Bed Mobility)  supervision  -CW      Sit-Supine [place] (Bed Mobility)  supervision  -CW      Assistive Device (Bed Mobility)  bed rails  -CW      Recorded by [CW] Robi Michel, PTA 02/05/19 1627      Row Name 02/05/19 1605             Transfer Assessment/Treatment    Transfer Assessment/Treatment  stand-sit transfer;sit-stand transfer  -CW      Recorded by [CW] Robi Michel, PTA 02/05/19 1627      Row Name 02/05/19 1605             Sit-Stand Transfer    Sit-Stand [place] (Transfers)  contact guard  -CW      Assistive Device (Sit-Stand Transfers)  walker, front-wheeled  -CW      Recorded by [CW] Robi Michel, PTA 02/05/19 1627      Row Name 02/05/19 1605             Stand-Sit Transfer    Stand-Sit [place] (Transfers)  contact guard  -CW      Assistive Device (Stand-Sit Transfers)  walker, front-wheeled  -CW      Recorded by [CW] Robi Michel, PTA 02/05/19 1627      Row Name 02/05/19 1605             Gait/Stairs Assessment/Training    [place] Level (Gait)  contact guard  -CW      Assistive Device (Gait)  walker, front-wheeled  -CW      Distance in Feet (Gait)  80  -CW      Pattern (Gait)  step-through  -CW      Deviations/Abnormal Patterns (Gait)  --  -CW      Bilateral Gait Deviations  --  -CW      Recorded by [CW] Robi Michel, PTA 02/05/19 1627      Row Name 02/05/19 1605              Positioning and Restraints    Pre-Treatment Position  in bed  -CW      Post Treatment Position  bed  -CW      In Bed  notified nsg;fowlers;call light within reach;encouraged to call for assist;exit alarm on  -CW      Recorded by [CW] Robi Michel PTA 02/05/19 1627      Row Name                Wound 01/30/19 0800 Left gluteal pressure injury    Wound - Properties Group Date first assessed: 01/30/19 [MS] Time first assessed: 0800 [MS] Present On Admission : yes [MS] Side: Left [MS] Location: gluteal [MS] Type: pressure injury [MS] Stage, Pressure Injury: Stage 2 [MS2] Recorded by:  [MS] Arina Kirk RN 01/30/19 1523 [MS2] Arina Kirk RN 01/30/19 1524    Row Name                Wound 01/30/19 0800 Right gluteal pressure injury    Wound - Properties Group Date first assessed: 01/30/19 [MS] Time first assessed: 0800 [MS] Present On Admission : yes [MS] Side: Right [MS] Location: gluteal [MS] Type: pressure injury [MS] Stage, Pressure Injury: Stage 2 [MS] Recorded by:  [MS] Arina Kirk RN 01/30/19 1524    Row Name                Wound 01/30/19 0800 Bilateral gluteal pressure injury    Wound - Properties Group Date first assessed: 01/30/19 [MS] Time first assessed: 0800 [MS] Present On Admission : yes [MS] Side: Bilateral [MS] Location: gluteal [MS] Type: pressure injury [MS] Stage, Pressure Injury: deep tissue injury [MS] Recorded by:  [MS] Arina Kirk RN 01/30/19 1525    Row Name                Wound 02/02/19 1013 Other (See comments) abdomen incision    Wound - Properties Group Date first assessed: 02/02/19 [MC] Time first assessed: 1013 [MC] Side: Other (See comments) [MC] Location: abdomen [MC] Type: incision [MC] Recorded by:  [MC] Lucinda Madrigal RN 02/02/19 1013    Row Name 02/05/19 1605             Outcome Summary/Treatment Plan (PT)    Anticipated Discharge Disposition (PT)  skilled nursing facility  -CW      Recorded by [CW] Robi Michel PTA 02/05/19 1627        User Key  (r) =  Recorded By, (t) = Taken By, (c) = Cosigned By    Initials Name Effective Dates Discipline    MS Kirk Arina, RN 06/16/16 -  Nurse    Robi Lira, KATTY 03/07/18 -  PT    Lucinda Burr, RN 02/23/18 -  Nurse          Wound 01/30/19 0800 Left gluteal pressure injury (Active)   Dressing Appearance dry;intact 2/5/2019  8:13 AM   Closure JILLIAN 2/5/2019  8:13 AM   Base pink;purple 2/5/2019  8:13 AM   Periwound intact 2/5/2019  8:13 AM   Periwound Temperature warm 2/5/2019  8:13 AM   Periwound Skin Turgor soft 2/5/2019  8:13 AM   Edges irregular 2/5/2019  8:13 AM   Drainage Amount none 2/5/2019  8:13 AM   Dressing Care, Wound dressing changed 2/5/2019 12:13 PM       Wound 01/30/19 0800 Right gluteal pressure injury (Active)   Dressing Appearance dry;intact 2/5/2019  8:13 AM   Closure JILLIAN 2/5/2019  8:13 AM   Base dressing in place, unable to visualize 2/5/2019  8:13 AM   Periwound dry 2/5/2019  8:13 AM   Periwound Temperature warm 2/5/2019  8:13 AM   Periwound Skin Turgor soft 2/5/2019  8:13 AM   Edges irregular;rolled/closed 2/5/2019  8:13 AM   Drainage Amount none 2/5/2019  8:13 AM   Care, Wound cleansed with;soap and water 2/5/2019 12:13 PM   Dressing Care, Wound dressing changed 2/5/2019 12:13 PM       Wound 01/30/19 0800 Bilateral gluteal pressure injury (Active)   Dressing Appearance open to air 2/5/2019  8:13 AM   Closure JILLIAN 2/5/2019  8:13 AM   Base dressing in place, unable to visualize 2/5/2019  8:13 AM   Periwound intact 2/5/2019  8:13 AM   Periwound Temperature warm 2/5/2019  8:13 AM   Periwound Skin Turgor soft 2/5/2019  8:13 AM   Edges rolled/closed 2/5/2019  8:13 AM   Drainage Amount none 2/5/2019  8:13 AM   Dressing Care, Wound open to air 2/5/2019 12:13 PM       Wound 02/02/19 1013 Other (See comments) abdomen incision (Active)   Dressing Appearance dry;intact 2/5/2019  8:13 AM   Closure JILLIAN 2/5/2019  8:13 AM   Periwound Temperature warm 2/5/2019  8:13 AM   Periwound Skin Turgor soft 2/5/2019   8:13 AM   Drainage Amount none 2/5/2019  8:13 AM           Physical Therapy Education     Title: PT OT SLP Therapies (Done)     Topic: Physical Therapy (Done)     Point: Mobility training (Done)     Learning Progress Summary           Patient Acceptance, E,TB, VU,DU by CW at 2/5/2019  4:27 PM    Acceptance, E, NR by CA at 2/4/2019 12:10 PM    Acceptance, E, NR by AR at 2/3/2019  3:20 PM                   Point: Home exercise program (Done)     Learning Progress Summary           Patient Acceptance, E,TB, VU,DU by CW at 2/5/2019  4:27 PM    Acceptance, E, NR by CA at 2/4/2019 12:10 PM    Acceptance, E, NR by AR at 2/3/2019  3:20 PM                   Point: Body mechanics (Done)     Learning Progress Summary           Patient Acceptance, E,TB, VU,DU by CW at 2/5/2019  4:27 PM    Acceptance, E, NR by CA at 2/4/2019 12:10 PM    Acceptance, E, NR by AR at 2/3/2019  3:20 PM                   Point: Precautions (Done)     Learning Progress Summary           Patient Acceptance, E,TB, VU,DU by CW at 2/5/2019  4:27 PM    Acceptance, E, NR by LL at 2/5/2019 10:09 AM    Acceptance, E, NR by CA at 2/4/2019 12:10 PM    Acceptance, E, NR by AR at 2/3/2019  3:20 PM                               User Key     Initials Effective Dates Name Provider Type Discipline    AR 04/03/18 -  Alyse Paul, PT Physical Therapist PT    CW 03/07/18 -  Robi Michel, PTA Physical Therapy Assistant PT    CA 06/13/18 -  Carina Stahl, PT Physical Therapist PT    LL 07/13/18 -  Tammy Higginbotham, RN Registered Nurse Nurse                PT Recommendation and Plan  Anticipated Discharge Disposition (PT): skilled nursing facility  Therapy Frequency (PT Clinical Impression): daily  Outcome Summary/Treatment Plan (PT)  Anticipated Discharge Disposition (PT): skilled nursing facility  Plan of Care Reviewed With: patient  Progress: improving  Outcome Summary: Pt increased with bed mobility transfers, and amb safety with RWX and improved gait  distance  Outcome Measures     Row Name 02/05/19 1600 02/04/19 1200          How much help from another person do you currently need...    Turning from your back to your side while in flat bed without using bedrails?  3  -CW  3  -CA     Moving from lying on back to sitting on the side of a flat bed without bedrails?  3  -CW  2  -CA     Moving to and from a bed to a chair (including a wheelchair)?  3  -CW  2  -CA     Standing up from a chair using your arms (e.g., wheelchair, bedside chair)?  3  -CW  2  -CA     Climbing 3-5 steps with a railing?  1  -CW  1  -CA     To walk in hospital room?  3  -CW  3  -CA     AM-PAC 6 Clicks Score  16  -CW  13  -CA        Functional Assessment    Outcome Measure Options  AM-PAC 6 Clicks Basic Mobility (PT)  -CW  AM-PAC 6 Clicks Basic Mobility (PT)  -CA       User Key  (r) = Recorded By, (t) = Taken By, (c) = Cosigned By    Initials Name Provider Type    Robi Lira PTA Physical Therapy Assistant    Carina Seaman, PT Physical Therapist         Time Calculation:   PT Charges     Row Name 02/05/19 1628             Time Calculation    Start Time  1600  -CW      Stop Time  1618  -CW      Time Calculation (min)  18 min  -CW      PT Received On  02/05/19  -CW      PT - Next Appointment  02/06/19  -CW        User Key  (r) = Recorded By, (t) = Taken By, (c) = Cosigned By    Initials Name Provider Type    Robi Lira PTA Physical Therapy Assistant        Therapy Suggested Charges     Code   Minutes Charges    None           Therapy Charges for Today     Code Description Service Date Service Provider Modifiers Qty    46710289003 HC PT THER PROC EA 15 MIN 2/5/2019 Robi Michel PTA GP 1    40150436518 HC PT THER SUPP EA 15 MIN 2/5/2019 Robi Michel PTA GP 1          PT G-Codes  Outcome Measure Options: AM-PAC 6 Clicks Basic Mobility (PT)  AM-PAC 6 Clicks Score: 16    Robi Michel PTA  2/5/2019

## 2019-02-05 NOTE — PROGRESS NOTES
Cc: Dysphagia     POD# 2 s/p PEG tube     S: No complaints, no abdominal pain. Tolerated tube feeds    O:   Vitals:    02/04/19 0728 02/04/19 1211 02/04/19 1627 02/04/19 2010   BP: 130/68 138/83 124/71 132/69   BP Location: Left arm Left arm Left arm Right arm   Patient Position: Lying Sitting Sitting Lying   Pulse: 73 73 75 80   Resp: 18 18 18 18   Temp: 98.6 °F (37 °C) 97.9 °F (36.6 °C) 98.7 °F (37.1 °C) 98.6 °F (37 °C)   TempSrc: Oral Oral Oral Oral   SpO2: 95% 93% 95% 92%   Weight:       Height:         AOx3, NAD  Abdomen soft and not tender, PEG tube in place      POD# 2 s/p PEG tube, no issues,     - Cont TF as needed  - Follow up in my office when ready to have tube removed  - PEG care  - Will sign off    Twin Bobby MD, FACS  General, Minimally Invasive and Endoscopic Surgery  [facility]    [address] [place] [address], [address]  [place], [place], [address]  P: [phone]  F: [phone]

## 2019-02-05 NOTE — PROGRESS NOTES
Adult Nutrition  Assessment/PES    Patient Name:  Rosalio Rene  YOB: 1940  MRN: [id number]  Admit Date:  1/29/2019    Assessment Date:  2/5/2019    Follow up-patient tolerating TF-Nutren 1.5 360 mL bolus 4x/day. Patient prefers to stay on this formula instead of switching to a diabetic formula.  Provided nutrition therapy. Patient consumed about 300 kcal for breakfast. Went over how to adjust bolus schedule if po intake >50% or about 500 kcal. Gave him contact information to outpatient RD.  RD to continue to follow.    Reason for Assessment     Row Name 02/05/19 1021          Reason for Assessment    Reason For Assessment  follow-up protocol;TF/PN             Labs/Tests/Procedures/Meds     Row Name 02/05/19 1021          Labs/Procedures/Meds    Lab Results Reviewed  reviewed, pertinent        Diagnostic Tests/Procedures    Diagnostic Test/Procedure Reviewed  reviewed, pertinent        Medications    Pertinent Medications Reviewed  reviewed, pertinent         Physical Findings     Row Name 02/05/19 1021          Physical Findings    Gastrointestinal  feeding tube     Tubes  PEG           Nutrition Prescription Ordered     Row Name 02/05/19 1021          Nutrition Prescription PO    Current PO Diet  -- as tolerated         Nutrition Prescription EN    Enteral Route  PEG     Product  Nutren 1.5 (Osmolite 1.5)     TF Delivery Method  Bolus     TF Bolus Goal Volume (mL)  360 mL     TF Bolus Frequency  4 times a day     Water flush (mL)   100 mL     Water Flush Frequency  Every feeding         Evaluation of Received Nutrient/Fluid Intake     Row Name 02/05/19 1022          Calories Evaluation    Enteral Calories (kcal)  2160     Oral Calories (kcal)  350        Protein Evaluation    Enteral Protein (gm)  108     Oral Protein (gm)  5           Problem/Interventions:      Intervention Goal     Row Name 02/05/19 1022          Intervention Goal    General  Maintain nutrition;Nutrition support treatment      PO  Tolerate PO;Increase intake     TF/PN  Tolerate TF at goal     Weight  Maintain weight         Nutrition Intervention     Row Name 02/05/19 1022          Nutrition Intervention    RD/Tech Action  Interview for preference;Follow Tx progress;Care plan reviewd;Encourage intake         Nutrition Prescription     Row Name 02/05/19 1022          Nutrition Prescription EN    Enteral Prescription  Continue same protocol         Education/Evaluation     Row Name 02/05/19 1022          Education    Education  Will Instruct as appropriate        Monitor/Evaluation    Monitor  Per protocol           Electronically signed by:  Toyin Montiel RD  02/05/19 10:23 AM

## 2019-02-05 NOTE — PROGRESS NOTES
LOS: 5 days   Patient Care Team:  Erinn Howard MD as PCP - General (Internal Medicine)  Erinn Howard MD as PCP - Family Medicine  Nida Meyers MD as Consulting Physician (Radiation Oncology)    Subjective     Reports he is doing well no pain no nausea no shortness of breath no cough    Review of Systems:          Objective     Vital Signs  Vital Sign Min/Max for last 24 hours  Temp  Min: 97.1 °F (36.2 °C)  Max: 98.7 °F (37.1 °C)   BP  Min: 124/71  Max: 153/91   Pulse  Min: 67  Max: 80   Resp  Min: 16  Max: 18   SpO2  Min: 92 %  Max: 95 %   No Data Recorded   No Data Recorded        Ventilator/Non-Invasive Ventilation Settings (From admission, onward)    Start     Ordered    01/30/19 0142  Ventilator - AC/VC; (16); 50; 5; 450  Continuous     Question Answer Comment   Vent Mode AC/VC    Breath rate  16   FiO2 50    PEEP 5    Tidal Volume 450        01/30/19 0141    01/29/19 1415  NIPPV (CPAP or BIPAP)  At Bedtime & As Needed-RT,   Status:  Canceled     Question Answer Comment   Indication: Sleep Apnea    Type: CPAP    NIPPV Mask Interface Per Patient Preference    Pressure 10    Titrate for SPO2 90%        01/29/19 1414                       Body mass index is 26.46 kg/m².  I/O last 3 completed shifts:  In: 3188 [P.O.:120; I.V.:1278; Other:340; NG/GT:1350; IV Piggyback:100]  Out: 750 [Urine:750]  No intake/output data recorded.        Physical Exam:  General Appearance: Well-developed white male resting in bed does not appear in any acute distress  Eyes: Conjunctiva are clear and anicteric  ENT: Mucous membranes are moist his voice is a little hoarse.  He has a ecchymoses on his uvula and soft palate and look to be several days old and resolving.  Nasal septum midline nasal mucosa little dry  Neck: Trachea is midline there is no stridor no palpable lymphadenopathy or thyromegaly no jugular venous distention  Lungs: Clear nonlabored symmetric expansion no wheezes rales or rhonchi  Cardiac: Regular  rate and rhythm no murmur  Abdomen: Soft no palpable organomegaly or masses  : Not examined   Musculoskeletal: Grossly normal  Skin: He has a large growth on his upper mid right anterior chest  Neuro: He is alert and oriented he is cooperative following commands moving all extremities grossly intact  Extremities/P Vascular: No clubbing no cyanosis no edema palpable radial and dorsalis pedis pulses bilaterally  MSE: He seems to be appropriate today       Labs:  Results from last 7 days   Lab Units 02/04/19  0656 02/03/19  0626 02/02/19  0407 02/01/19  0539 01/31/19  1357 01/31/19  0703 01/30/19  0428   GLUCOSE mg/dL 228* 117* 100* 168* 156* 181* 155*   SODIUM mmol/L 146* 143 144 139 140 141 143   POTASSIUM mmol/L 3.3* 3.7 3.0* 3.9 4.0 4.5 5.7*   MAGNESIUM mg/dL  --   --   --   --   --   --  2.0   CO2 mmol/L 28.8 28.3 29.7* 32.0* 33.0* 28.3 30.5*   CHLORIDE mmol/L 105 101 102 95* 96* 100 100   ANION GAP mmol/L 12.2 13.7 12.3 12.0 11.0 12.7 12.5   CREATININE mg/dL 1.05 1.03 1.03 1.17 1.23 1.18 1.35*   BUN mg/dL 26* 28* 36* 45* 45* 46* 41*   BUN / CREAT RATIO  24.8 27.2* 35.0* 38.5* 36.6* 39.0* 30.4*   CALCIUM mg/dL 8.8 8.9 8.7 9.3 9.2 9.0 8.7   EGFR IF NONAFRICN AM mL/min/1.73 68 70 70 60* 57* 60* 51*   ALK PHOS U/L 38*  --   --   --   --  37*  --    TOTAL PROTEIN g/dL 5.9*  --   --   --   --  6.3  --    ALT (SGPT) U/L 40  --   --   --   --  10  --    AST (SGOT) U/L 27  --   --   --   --  13  --    BILIRUBIN mg/dL 1.4*  --   --   --   --  0.6  --    ALBUMIN g/dL 3.00*  --   --   --   --  2.90*  --    GLOBULIN gm/dL 2.9  --   --   --   --  3.4  --      Estimated Creatinine Clearance: 70.5 mL/min (by C-G formula based on SCr of 1.05 mg/dL).      Results from last 7 days   Lab Units 02/04/19  0656 02/02/19  0407 02/01/19  0539 01/31/19  0605 01/30/19  0312 01/29/19  1438   WBC 10*3/mm3 7.71 8.01 7.40 8.97 8.06 8.50   RBC 10*6/mm3 4.38* 4.36* 4.49* 4.26* 3.93* 4.25*   HEMOGLOBIN g/dL 12.5* 12.3* 12.7* 12.1* 11.3* 12.0*    HEMATOCRIT % 41.5 40.5 41.1 39.7* 39.2* 41.9   MCV fL 94.7 92.9 91.5 93.2 99.7* 98.6*   MCH pg 28.5 28.2 28.3 28.4 28.8 28.2   MCHC g/dL 30.1* 30.4* 30.9* 30.5* 28.8* 28.6*   RDW % 15.1* 15.2* 15.0* 15.0* 14.4 14.9*   RDW-SD fl 52.4 51.8 50.7 50.7 53.2 53.5   MPV fL 10.5 10.4 10.5 10.7 11.2 10.4   PLATELETS 10*3/mm3 191 194 223 225 193 227   NEUTROPHIL % %  --   --  83.1*  --   --  94.0*   LYMPHOCYTE % %  --   --  7.6*  --   --  5.2*   MONOCYTES % %  --   --  8.9  --   --  0.2*   EOSINOPHIL % %  --   --  0.1*  --   --  0.0*   BASOPHIL % %  --   --  0.0  --   --  0.2   IMM GRAN % %  --   --  0.3  --   --  0.4   NEUTROS ABS 10*3/mm3  --   --  6.15  --   --  7.99   LYMPHS ABS 10*3/mm3  --   --  0.56*  --   --  0.44*   MONOS ABS 10*3/mm3  --   --  0.66  --   --  0.02*   EOS ABS 10*3/mm3  --   --  0.01  --   --  0.00   BASOS ABS 10*3/mm3  --   --  0.00  --   --  0.02   IMMATURE GRANS (ABS) 10*3/mm3  --   --  0.02  --   --  0.03     Results from last 7 days   Lab Units 01/30/19  0242   PH, ARTERIAL pH units 7.202*   PO2 ART mm Hg 87.0   PCO2, ARTERIAL mm Hg 86.1*   HCO3 ART mmol/L 33.8*     Results from last 7 days   Lab Units 01/30/19  0428   TROPONIN T ng/mL 0.021         Results from last 7 days   Lab Units 02/01/19  0539   TSH mIU/mL 1.150   FREE T4 ng/dL 1.40             Microbiology Results (last 10 days)     ** No results found for the last 240 hours. **                aspirin 81 mg Oral Daily   carvedilol 3.125 mg Per G Tube Q12H   enoxaparin 40 mg Subcutaneous Q24H   famotidine 20 mg Intravenous Q12H   insulin lispro 0-14 Units Subcutaneous Q4H   MethylPREDNISolone 4 mg Oral After Lunch   MethylPREDNISolone 4 mg Oral After Dinner   [START ON 2/5/2019] MethylPREDNISolone 4 mg Oral TID Around Food   [START ON 2/6/2019] MethylPREDNISolone 4 mg Oral 4x Daily Taper   [START ON 2/7/2019] MethylPREDNISolone 4 mg Oral TID Around Food   [START ON 2/8/2019] MethylPREDNISolone 4 mg Oral Before Breakfast   [START ON  2/8/2019] MethylPREDNISolone 4 mg Oral Tonight   [START ON 2/9/2019] MethylPREDNISolone 4 mg Oral Before Breakfast   MethylPREDNISolone 8 mg Oral Before Breakfast   MethylPREDNISolone 8 mg Oral Tonight   [START ON 2/5/2019] MethylPREDNISolone 8 mg Oral Tonight   piperacillin-tazobactam 3.375 g Intravenous Q8H       lactated ringers 9 mL/hr Last Rate: 9 mL/hr (02/02/19 0927)   Pharmacy to Dose Zosyn     sodium chloride 50 mL/hr Last Rate: 50 mL/hr (02/04/19 1640)       Diagnostics:  Ct Soft Tissue Neck With Contrast    Result Date: 1/29/2019  CT NECK WITH CONTRAST  HISTORY: Vocal cord cancer.  COMPARISON: 08/2018.  FINDINGS: The visualized portions of the skull base appear unremarkable. The parotid, submandibular thyroid gland appear unremarkable.  The epiglottis appears unremarkable. The cords are symmetrical. The aryepiglottic folds are at the upper limits of normal in size. No discrete mass is identified. There is no pathologic adenopathy. The superficial cutaneous lesion is appreciated involving the chest wall anteriorly to the right of midline just below the clavicle similar in appearance when compared to the examination of 8/7/2018 and measuring approximately 28 mm in size.      1. No discrete mass involving the cord is appreciated. The aryepiglottic folds are at the upper limits of normal in size. Clinical correlation and direct visualization is suggested. There is no evidence of pathologic adenopathy. 2. Superficial cutaneous lesion involving the chest wall anteriorly medially just below the clavicle similar in appearance when compared to the examination of 08/07/2018, nonspecific. Directed physical examination is suggested.    Radiation dose reduction techniques were utilized, including automated exposure control and exposure modulation based on body size.  This report was finalized on 1/29/2019 1:22 PM by Dr. Alo Navarro M.D.      Xr Chest 1 View    Result Date: 1/30/2019  PORTABLE CHEST RADIOGRAPH; KUB   HISTORY: Intubation and nasogastric tube placement  COMPARISON: None available.  FINDINGS: Endotracheal tubes terminates in satisfactory position. Patient also appears to be nasogastric tube which extends into the body of the stomach. Cardiomegaly is identified. There is some vascular congestion, but patient is also noted to have extensive basilar consolidation concerning for pneumonia. There are bilateral pleural effusions. No pneumothorax is seen. Visualized bowel gas pattern is unremarkable.       1. Tubes and lines terminate in satisfactory position. 2. Bibasilar consolidation, concerning for pneumonia. Short-term follow-up exam is recommended.  This report was finalized on 1/30/2019 1:58 AM by Dr. Nadia Baez M.D.      Xr Abdomen Kub    Result Date: 1/30/2019  PORTABLE CHEST RADIOGRAPH; KUB  HISTORY: Intubation and nasogastric tube placement  COMPARISON: None available.  FINDINGS: Endotracheal tubes terminates in satisfactory position. Patient also appears to be nasogastric tube which extends into the body of the stomach. Cardiomegaly is identified. There is some vascular congestion, but patient is also noted to have extensive basilar consolidation concerning for pneumonia. There are bilateral pleural effusions. No pneumothorax is seen. Visualized bowel gas pattern is unremarkable.       1. Tubes and lines terminate in satisfactory position. 2. Bibasilar consolidation, concerning for pneumonia. Short-term follow-up exam is recommended.  This report was finalized on 1/30/2019 1:58 AM by Dr. Nadia Baez M.D.      Results for orders placed during the hospital encounter of 10/24/18   Adult Transthoracic Echo Complete W/ Cont if Necessary Per Protocol    Narrative · Left ventricular systolic function is hyperdynamic (EF > 70%).   Calculated EF = 71%. Estimated EF was in agreement with the calculated EF.   Normal left ventricular cavity size noted. All left ventricular wall   segments contract normally.  Left ventricular wall thickness is consistent   with mild concentric hypertrophy. Left ventricular diastolic dysfunction   is noted (grade I) consistent with impaired relaxation.  · Normal right ventricular wall thickness and systolic function noted.   Right ventricular cavity is moderately dilated. Abnormal septal motion.   Systolic flattening of interventricular septum consistent with right   ventricle pressure overload.  · Right atrial cavity size is moderately dilated  · The aortic valve is abnormal in structure. There is thickening of the   aortic valve.  · Physiologic tricuspid valve regurgitation is present. Calculated right   ventricular systolic pressure from tricuspid regurgitation is 21 mmHg.   Insufficient TR velocity profile to estimate the right ventricular   systolic pressure. This is an incomplete jet of tricuspid regurgitation   and cannot assess RV systolic pressure adequately/accurately based on the   signal. From the 2-D images and findings of systolic flattening of the   ventricular septum, there is likely significant pulmonary hypertension.  · Patient was contacted to return for additional imaging but absolutely   refused to do so.              Active Hospital Problems    Diagnosis Date Noted   • **Acute respiratory failure with hypoxia and hypercapnia (CMS/[facility]) [J96.01, J96.02] 02/03/2019   • COPD (chronic obstructive pulmonary disease) (CMS/[facility]) [J44.9] 02/03/2019   • Hypertension [I10] 02/03/2019   • Type 2 diabetes mellitus (CMS/[facility]) [E11.9] 02/03/2019   • Vocal cord paralysis [J38.00] 02/03/2019   • Aspiration pneumonia of both lower lobes (healthcare associated) [J69.0] 02/03/2019   • Hypokalemia [E87.6] 02/03/2019   • Decubitus ulcer of coccyx, unspecified pressure ulcer stage [L89.159] 02/03/2019   • Laryngeal cancer (CMS/[facility]) [C32.9] 01/29/2019   • Oropharyngeal dysphagia [R13.12] 01/24/2019      Resolved Hospital Problems   No resolved problems to display.     Chest x-ray reviewed  there is improvement in the right base infiltrate minimal residual infiltrate/atelectasis.  I would ideally like to a PA and lateral patellar patient refused to go for PA and lateral film    Assessment/Plan     1. 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  2. Dysphagia PEG tube placed tolerating tube feeds  3. Acute hypoxemic and hypercapnic respiratory failure resolved,   4. Possible pneumonia day 6 of antibiotics chest x-ray improved I think we can DC antibiotics after tomorrow day 7   5. Hyperglycemia steroid induced on sliding scale insulin   6. Hyperkalemia resolved now hypokalemic replace carefully    Plan for disposition: Home tomorrow is fine from a pulmonary standpoint, I will see when necessary    Hussein Dubon MD  02/04/19  7:07 PM    Time:

## 2019-02-05 NOTE — PLAN OF CARE
Problem: Patient Care Overview  Goal: Plan of Care Review  Outcome: Ongoing (interventions implemented as appropriate)   02/05/19 0248   Coping/Psychosocial   Plan of Care Reviewed With patient   Plan of Care Review   Progress improving   OTHER   Outcome Summary Bolus tube feeds cont. every 6 hours. 360 cc per PEG tube. Tolerating well. Denies pain, ambulates with PT. Up to c with assist. Denies nausea. Pressure injury on buttock. Turning every 2 hours but refuses most of the time. Educated on importance of turning and skin breakdown. Meplix in place. Poss. d/c to rehab before home. Patient doesn't act like he wants to do that. Swallows pills fine. Started Medrol Dose pack tonight. Condom cath in place. Accuchecks Q4H. Sugars have been 291, 210 because of steriods.       Problem: Fall Risk (Adult)  Goal: Absence of Fall  Outcome: Ongoing (interventions implemented as appropriate)      Problem: Skin Injury Risk (Adult)  Goal: Skin Health and Integrity  Outcome: Ongoing (interventions implemented as appropriate)      Problem: Anxiety (Adult)  Goal: Reduction/Resolution  Outcome: Ongoing (interventions implemented as appropriate)      Problem: Pain, Acute (Adult)  Goal: Acceptable Pain Control/Comfort Level  Outcome: Ongoing (interventions implemented as appropriate)      Problem: Dysphagia (Adult)  Goal: Functional/Safe Swallow  Outcome: Ongoing (interventions implemented as appropriate)

## 2019-02-05 NOTE — PLAN OF CARE
Problem: Patient Care Overview  Goal: Plan of Care Review  Outcome: Ongoing (interventions implemented as appropriate)   02/05/19 1565   Coping/Psychosocial   Plan of Care Reviewed With patient   Plan of Care Review   Progress improving   OTHER   Outcome Summary Pt increased with bed mobility transfers, and amb safety with RWX and improved gait distance

## 2019-02-05 NOTE — PLAN OF CARE
Problem: Patient Care Overview  Goal: Plan of Care Review  Outcome: Ongoing (interventions implemented as appropriate)   02/05/19 1605   Coping/Psychosocial   Plan of Care Reviewed With patient   Plan of Care Review   Progress improving   OTHER   Outcome Summary Bolus tube feeds cont. at 360cc q4 4x/day. No pain or nausea. Bleeding at g tube insertion site, saturating gauze and leaking through binder. OT consulted. Diabetes edu consult. High blood glucose. new abd binder placed. Will continue to monitor.      Goal: Discharge Needs Assessment  Outcome: Ongoing (interventions implemented as appropriate)    Goal: Interprofessional Rounds/Family Conf  Outcome: Ongoing (interventions implemented as appropriate)      Problem: Fall Risk (Adult)  Goal: Absence of Fall  Outcome: Ongoing (interventions implemented as appropriate)      Problem: Skin Injury Risk (Adult)  Goal: Skin Health and Integrity  Outcome: Ongoing (interventions implemented as appropriate)      Problem: Anxiety (Adult)  Goal: Reduction/Resolution  Outcome: Ongoing (interventions implemented as appropriate)      Problem: Pain, Acute (Adult)  Goal: Acceptable Pain Control/Comfort Level  Outcome: Ongoing (interventions implemented as appropriate)      Problem: Dysphagia (Adult)  Goal: Functional/Safe Swallow  Outcome: Ongoing (interventions implemented as appropriate)    Goal: Compensatory Techniques to Improve Safety/Function with Swallowing  Outcome: Ongoing (interventions implemented as appropriate)      Problem: Nutrition, Enteral (Adult)  Goal: Signs and Symptoms of Listed Potential Problems Will be Absent, Minimized or Managed (Nutrition, Enteral)  Outcome: Ongoing (interventions implemented as appropriate)

## 2019-02-06 NOTE — PLAN OF CARE
Problem: Patient Care Overview  Goal: Plan of Care Review  Outcome: Ongoing (interventions implemented as appropriate)   02/06/19 8787   Coping/Psychosocial   Plan of Care Reviewed With patient   Plan of Care Review   Progress improving   OTHER   Outcome Summary Pt improving with bed mobility and safety to get to EOB and then back into bed. Pt improved with strength to increase transfer safety and I to RWX.

## 2019-02-06 NOTE — PROGRESS NOTES
Name: Rosalio Rene ADMIT: 2019   : 1940  PCP: Erinn Howard MD    MRN: [id number] LOS: 7 days   AGE/SEX: 78 y.o. male  ROOM: [facility]   Subjective   No CP SOA NVD overnight. Reports had some difficulty with sleep.    Objective   Vital Signs  Temp:  [96.9 °F (36.1 °C)-98.5 °F (36.9 °C)] 98.1 °F (36.7 °C)  Heart Rate:  [56-71] 70  Resp:  [16-18] 18  BP: (126-145)/(66-74) 126/71  SpO2:  [93 %-96 %] 93 %  on   ;   Device (Oxygen Therapy): room air  Body mass index is 27.71 kg/m².    Physical Exam   Constitutional: He is oriented to person, place, and time. He appears well-developed. No distress.   HENT:   Head: Atraumatic.   Nose: Nose normal.   Eyes: Conjunctivae and EOM are normal.   Neck: Normal range of motion. Neck supple.   Cardiovascular: Normal rate, regular rhythm and intact distal pulses.   Pulmonary/Chest: Effort normal. He has decreased breath sounds. He has no wheezes.   Abdominal: Soft. He exhibits no distension.   PEG in place   Musculoskeletal: He exhibits no edema or tenderness.   Neurological: He is alert and oriented to person, place, and time.   Skin: Skin is warm and dry. He is not diaphoretic.   Psychiatric: He has a normal mood and affect. His behavior is normal.   Nursing note and vitals reviewed.      Results Review:       I reviewed the patient's new clinical results.      Results from last 7 days   Lab Units 19  0656 19  0407   WBC 10*3/mm3 9.16 7.40 7.71 8.01   HEMOGLOBIN g/dL 11.8* 11.7* 12.5* 12.3*   PLATELETS 10*3/mm3 182 178 191 194     Results from last 7 days   Lab Units 19  0456 1937 19  0656 19  0626   SODIUM mmol/L 140 143 146* 143   POTASSIUM mmol/L 4.0 3.8 3.3* 3.7   CHLORIDE mmol/L 101 103 105 101   CO2 mmol/L 26.2 28.7 28.8 28.3   BUN mg/dL 18 23 26* 28*   CREATININE mg/dL 0.79 0.88 1.05 1.03   GLUCOSE mg/dL 168* 273* 228* 117*   Estimated Creatinine Clearance: 97 mL/min (by C-G formula  based on SCr of 0.79 mg/dL).  Results from last 7 days   Lab Units 02/06/19  0456 02/05/19  0637 02/04/19  0656 02/03/19  0626  01/31/19  0703   CALCIUM mg/dL 8.2* 8.5* 8.8 8.9   < > 9.0   ALBUMIN g/dL  --  2.90* 3.00*  --   --  2.90*   MAGNESIUM mg/dL  --  1.8  --   --   --   --     < > = values in this interval not displayed.         aspirin 81 mg Oral Daily   carvedilol 3.125 mg Per G Tube Q12H   enoxaparin 40 mg Subcutaneous Q24H   famotidine 20 mg Intravenous Q12H   insulin glargine 10 Units Subcutaneous Nightly   insulin lispro 0-14 Units Subcutaneous Q4H   MethylPREDNISolone 4 mg Oral 4x Daily Taper   [START ON 2/7/2019] MethylPREDNISolone 4 mg Oral TID Around Food   [START ON 2/8/2019] MethylPREDNISolone 4 mg Oral Before Breakfast   [START ON 2/8/2019] MethylPREDNISolone 4 mg Oral Tonight   [START ON 2/9/2019] MethylPREDNISolone 4 mg Oral Before Breakfast       lactated ringers 9 mL/hr Last Rate: 9 mL/hr (02/02/19 0927)   Diet Full Liquid; Consistent Carbohydrate      Assessment/Plan      Active Hospital Problems    Diagnosis Date Noted   • **Acute respiratory failure with hypoxia and hypercapnia (CMS/[facility]) [J96.01, J96.02] 02/03/2019   • COPD (chronic obstructive pulmonary disease) (CMS/[facility]) [J44.9] 02/03/2019   • Hypertension [I10] 02/03/2019   • Type 2 diabetes mellitus (CMS/[facility]) [E11.9] 02/03/2019   • Vocal cord paralysis [J38.00] 02/03/2019   • Aspiration pneumonia of both lower lobes (healthcare associated) [J69.0] 02/03/2019   • Hypokalemia [E87.6] 02/03/2019   • Decubitus ulcer of coccyx, unspecified pressure ulcer stage [L89.159] 02/03/2019   • Laryngeal cancer (CMS/[facility]) [C32.9] 01/29/2019   • Oropharyngeal dysphagia [R13.12] 01/24/2019      Resolved Hospital Problems   No resolved problems to display.     - Laryngeal Cancer: On steroid taper. Sp PEG. Ok to ADAT while also getting TF. Patient had declined tracheostomy. ENT and Surgery following. Dr Godoy, Oncology, to see in clinic.  - Aspiration  PNA: Completed antibiotic course. Appreciate Pulmonology evaluation.  - Acute Hypoxemic/Hypercapnic Respiratory failure: Resolved.  - DM2: SSI. BG >300 yesterday now that getting TF and PO intake. Will start lantus. Check A1c.  - Urinary Retention: Voiding trial in AM.  - Weakness: Continue PT. SNF recommended at this time. OT and CCP consulted.  - Disposition: SNF vs /1-2 days    Reese Fairbanks MD  [facility]  02/06/19  2:38 PM

## 2019-02-06 NOTE — PLAN OF CARE
Problem: Patient Care Overview  Goal: Plan of Care Review  Outcome: Ongoing (interventions implemented as appropriate)   02/06/19 1526   Coping/Psychosocial   Plan of Care Reviewed With patient   Plan of Care Review   Progress improving   OTHER   Outcome Summary Pt ambulated to bed side commode with assist x1 multiple times. No external catheter usage today. No bleeding around gtube site. Tube feeds continued. Advanced to full liquid diet. Tolerating well with no difficulty swallowing. Bolus insulin added nightly. Will continue to monitor.      Goal: Discharge Needs Assessment  Outcome: Ongoing (interventions implemented as appropriate)    Goal: Interprofessional Rounds/Family Conf  Outcome: Ongoing (interventions implemented as appropriate)      Problem: Fall Risk (Adult)  Goal: Absence of Fall  Outcome: Ongoing (interventions implemented as appropriate)      Problem: Skin Injury Risk (Adult)  Goal: Skin Health and Integrity  Outcome: Ongoing (interventions implemented as appropriate)      Problem: Anxiety (Adult)  Goal: Reduction/Resolution  Outcome: Ongoing (interventions implemented as appropriate)      Problem: Pain, Acute (Adult)  Goal: Acceptable Pain Control/Comfort Level  Outcome: Ongoing (interventions implemented as appropriate)      Problem: Dysphagia (Adult)  Goal: Functional/Safe Swallow  Outcome: Ongoing (interventions implemented as appropriate)    Goal: Compensatory Techniques to Improve Safety/Function with Swallowing  Outcome: Ongoing (interventions implemented as appropriate)      Problem: Nutrition, Enteral (Adult)  Goal: Signs and Symptoms of Listed Potential Problems Will be Absent, Minimized or Managed (Nutrition, Enteral)  Outcome: Ongoing (interventions implemented as appropriate)

## 2019-02-06 NOTE — PROGRESS NOTES
Postoperative day 4 status post PEG tube placement    S: No evidence overnight.  No more bleeding around PEG tube    O: Alert and oriented  PEG tube in place, abdomen soft, no evidence of bleeding    Postoperative day 4 status post PEG tube placement, normal bleeding around PEG tube, dressing removed    - Continue to use PEG tube, follow-up in my office when ready to remove.  - Will sign off, call with questions    Twin Bobby MD, FACS  General, Minimally Invasive and Endoscopic Surgery  [facility]    [address], [address]  [place], [place], [address]  P: [phone]  F: [phone]

## 2019-02-06 NOTE — THERAPY TREATMENT NOTE
Acute Care - Physical Therapy Treatment Note  [facility]     Patient Name: Rosalio Rene  : 1940  MRN: [id number]  Today's Date: 2019  Onset of Illness/Injury or Date of Surgery: 19          Admit Date: 2019    Visit Dx:    ICD-10-CM ICD-9-CM   1. Acute respiratory failure with hypercapnia (CMS/HCC) J96.02 518.81   2. Laryngeal cancer (CMS/HCC) C32.9 161.9   3. 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)   • Laryngeal cancer (CMS/HCC)   • Acute respiratory failure with hypercapnia (CMS/HCC)   • Oropharyngeal dysphagia   • COPD (chronic obstructive pulmonary disease) (CMS/[facility])   • Hypertension   • Type 2 diabetes mellitus (CMS/HCC)   • Vocal cord paralysis   • Aspiration pneumonia of both lower lobes (healthcare associated)   • Acute respiratory failure with hypoxia and hypercapnia (CMS/HCC)   • Hypokalemia   • Decubitus ulcer of coccyx, unspecified pressure ulcer stage       Therapy Treatment    Rehabilitation Treatment Summary     Row Name 19 1526             Treatment Time/Intention    Discipline  physical therapy assistant  -CW      Document Type  therapy note (daily note)  -CW      Subjective Information  no complaints  -CW      Mode of Treatment  physical therapy  -CW      Therapy Frequency (PT Clinical Impression)  daily  -CW      Patient Effort  good  -CW      Existing Precautions/Restrictions  fall;NPO  -CW      Recorded by [CW] Robi Michel PTA 19 1557      Row Name 19 1526             Vital Signs    O2 Delivery Pre Treatment  room air  -CW      Recorded by [CW] Robi Michel PTA 19 1557      Row Name 19  1526             Cognitive Assessment/Intervention- PT/OT    Orientation Status (Cognition)  oriented x 4  -CW      Follows Commands (Cognition)  WNL  -CW      Safety Deficit (Cognitive)  mild deficit  -CW      Personal Safety Interventions  fall prevention program maintained;gait belt;muscle strengthening facilitated;nonskid shoes/slippers when out of bed  -CW      Recorded by [CW] Robi Michel, PTA 02/06/19 1557      Row Name 02/06/19 1526             Bed Mobility Assessment/Treatment    Bed Mobility Assessment/Treatment  supine-sit;sit-supine  -CW      Supine-Sit [place] (Bed Mobility)  supervision  -CW      Sit-Supine [place] (Bed Mobility)  supervision  -CW      Assistive Device (Bed Mobility)  bed rails  -CW      Recorded by [CW] Robi Michel, PTA 02/06/19 1557      Row Name 02/06/19 1526             Transfer Assessment/Treatment    Transfer Assessment/Treatment  stand-sit transfer;sit-stand transfer  -CW      Recorded by [CW] Robi Michel, PTA 02/06/19 1557      Row Name 02/06/19 1526             Sit-Stand Transfer    Sit-Stand [place] (Transfers)  contact guard  -CW      Assistive Device (Sit-Stand Transfers)  walker, front-wheeled  -CW      Recorded by [CW] Robi Michel, PTA 02/06/19 1557      Row Name 02/06/19 1526             Stand-Sit Transfer    Stand-Sit [place] (Transfers)  contact guard  -CW      Assistive Device (Stand-Sit Transfers)  walker, front-wheeled  -CW      Recorded by [CW] Robi Michel, PTA 02/06/19 1557      Row Name 02/06/19 1526             Gait/Stairs Assessment/Training    [place] Level (Gait)  contact guard  -CW      Assistive Device (Gait)  walker, front-wheeled  -CW      Distance in Feet (Gait)  80  -CW      Pattern (Gait)  step-through  -CW      Recorded by [CW] Robi Michel, PTA 02/06/19 1557      Row Name 02/06/19 1526             Positioning and Restraints    Pre-Treatment Position  in bed  -CW      Post  Treatment Position  bed  -CW      In Bed  notified nsg;supine;call light within reach;encouraged to call for assist  -CW      Recorded by [CW] Robi Michel PTA 02/06/19 1557      Row Name                Wound 01/30/19 0800 Left gluteal pressure injury    Wound - Properties Group Date first assessed: 01/30/19 [MS] Time first assessed: 0800 [MS] Present On Admission : yes [MS] Side: Left [MS] Location: gluteal [MS] Type: pressure injury [MS] Stage, Pressure Injury: Stage 2 [MS2] Recorded by:  [MS] Arina Kirk RN 01/30/19 1523 [MS2] Arina Kirk RN 01/30/19 1524    Row Name                Wound 01/30/19 0800 Right gluteal pressure injury    Wound - Properties Group Date first assessed: 01/30/19 [MS] Time first assessed: 0800 [MS] Present On Admission : yes [MS] Side: Right [MS] Location: gluteal [MS] Type: pressure injury [MS] Stage, Pressure Injury: Stage 2 [MS] Recorded by:  [MS] Arina Kirk RN 01/30/19 1524    Row Name                Wound 01/30/19 0800 Bilateral gluteal pressure injury    Wound - Properties Group Date first assessed: 01/30/19 [MS] Time first assessed: 0800 [MS] Present On Admission : yes [MS] Side: Bilateral [MS] Location: gluteal [MS] Type: pressure injury [MS] Stage, Pressure Injury: deep tissue injury [MS] Recorded by:  [MS] Arina Kirk RN 01/30/19 1525    Row Name                Wound 02/02/19 1013 Other (See comments) abdomen incision    Wound - Properties Group Date first assessed: 02/02/19 [MC] Time first assessed: 1013 [MC] Side: Other (See comments) [MC] Location: abdomen [MC] Type: incision [MC] Recorded by:  [MC] Lucinda Madrigal RN 02/02/19 1013    Row Name 02/06/19 1526             Outcome Summary/Treatment Plan (PT)    Anticipated Discharge Disposition (PT)  skilled nursing facility  -CW      Recorded by [CW] Robi Michel PTA 02/06/19 1557        User Key  (r) = Recorded By, (t) = Taken By, (c) = Cosigned By    Initials Name Effective Dates Discipline    MS  Arina Kirk, RN 06/16/16 -  Nurse    CW Robi Michel, PTA 03/07/18 -  PT    Lucinda Burr, RN 02/23/18 -  Nurse          Wound 01/30/19 0800 Left gluteal pressure injury (Active)   Dressing Appearance dry;intact 2/6/2019  8:03 AM   Closure JILLIAN 2/6/2019  8:03 AM   Base dressing in place, unable to visualize 2/6/2019  8:03 AM   Periwound intact 2/6/2019  8:03 AM   Periwound Temperature warm 2/6/2019  8:03 AM   Periwound Skin Turgor soft 2/6/2019  8:03 AM   Edges irregular 2/6/2019  8:03 AM   Drainage Amount none 2/6/2019  8:03 AM       Wound 01/30/19 0800 Right gluteal pressure injury (Active)   Dressing Appearance dry;intact 2/6/2019  8:03 AM   Closure JILLIAN 2/6/2019  8:03 AM   Base dressing in place, unable to visualize 2/6/2019  8:03 AM   Drainage Amount none 2/5/2019  8:08 PM       Wound 01/30/19 0800 Bilateral gluteal pressure injury (Active)   Dressing Appearance open to air 2/6/2019  8:03 AM   Closure JILLIAN 2/6/2019  8:03 AM   Base dressing in place, unable to visualize 2/6/2019  8:03 AM       Wound 02/02/19 1013 Other (See comments) abdomen incision (Active)   Dressing Appearance dry;intact 2/6/2019  8:03 AM   Closure JILLIAN 2/6/2019  8:03 AM   Periwound Temperature warm 2/6/2019  8:03 AM   Periwound Skin Turgor soft 2/6/2019  8:03 AM           Physical Therapy Education     Title: PT OT SLP Therapies (Done)     Topic: Physical Therapy (Done)     Point: Mobility training (Done)     Learning Progress Summary           Patient Acceptance, E,TB, VU,DU by CW at 2/6/2019  3:57 PM    Acceptance, E,TB, VU,DU by CW at 2/5/2019  4:27 PM    Acceptance, E, NR by CA at 2/4/2019 12:10 PM    Acceptance, E, NR by AR at 2/3/2019  3:20 PM                   Point: Home exercise program (Done)     Learning Progress Summary           Patient Acceptance, E,TB, VU,DU by CW at 2/6/2019  3:57 PM    Acceptance, E,TB, VU,DU by CW at 2/5/2019  4:27 PM    Acceptance, E, NR by CA at 2/4/2019 12:10 PM    Acceptance, E, NR by AR at  2/3/2019  3:20 PM                   Point: Body mechanics (Done)     Learning Progress Summary           Patient Acceptance, E,TB, VU,DU by CW at 2/6/2019  3:57 PM    Acceptance, E,TB, VU,DU by CW at 2/5/2019  4:27 PM    Acceptance, E, NR by CA at 2/4/2019 12:10 PM    Acceptance, E, NR by AR at 2/3/2019  3:20 PM                   Point: Precautions (Done)     Learning Progress Summary           Patient Acceptance, E,TB, VU,DU by CW at 2/6/2019  3:57 PM    Acceptance, E,TB, VU,DU by CW at 2/5/2019  4:27 PM    Acceptance, E, NR by LL at 2/5/2019 10:09 AM    Acceptance, E, NR by CA at 2/4/2019 12:10 PM    Acceptance, E, NR by AR at 2/3/2019  3:20 PM                               User Key     Initials Effective Dates Name Provider Type Discipline    AR 04/03/18 -  Alyse Paul, PT Physical Therapist PT    CW 03/07/18 -  Robi Michel, PTA Physical Therapy Assistant PT    CA 06/13/18 -  Carina Stahl, PT Physical Therapist PT    LL 07/13/18 -  Tammy Higginbotham, RN Registered Nurse Nurse                PT Recommendation and Plan  Anticipated Discharge Disposition (PT): skilled nursing facility  Therapy Frequency (PT Clinical Impression): daily  Outcome Summary/Treatment Plan (PT)  Anticipated Discharge Disposition (PT): skilled nursing facility  Plan of Care Reviewed With: patient  Progress: improving  Outcome Summary: Pt improving with bed mobility and safety to get to EOB and then back into bed.  Pt improved with strength to increase transfer safety and I to RWX.    Outcome Measures     Row Name 02/06/19 1500 02/05/19 1600 02/04/19 1200       How much help from another person do you currently need...    Turning from your back to your side while in flat bed without using bedrails?  3  -CW  3  -CW  3  -CA    Moving from lying on back to sitting on the side of a flat bed without bedrails?  3  -CW  3  -CW  2  -CA    Moving to and from a bed to a chair (including a wheelchair)?  3  -CW  3  -CW  2  -CA     Standing up from a chair using your arms (e.g., wheelchair, bedside chair)?  3  -CW  3  -CW  2  -CA    Climbing 3-5 steps with a railing?  1  -CW  1  -CW  1  -CA    To walk in hospital room?  3  -CW  3  -CW  3  -CA    AM-PAC 6 Clicks Score  16  -CW  16  -CW  13  -CA       Functional Assessment    Outcome Measure Options  AM-PAC 6 Clicks Basic Mobility (PT)  -CW  AM-PAC 6 Clicks Basic Mobility (PT)  -CW  AM-PAC 6 Clicks Basic Mobility (PT)  -CA      User Key  (r) = Recorded By, (t) = Taken By, (c) = Cosigned By    Initials Name Provider Type    Robi Lira PTA Physical Therapy Assistant    CA Carina Stahl, PT Physical Therapist         Time Calculation:   PT Charges     Row Name 02/06/19 1559             Time Calculation    Start Time  1541  -CW      Stop Time  1559  -CW      Time Calculation (min)  18 min  -CW      PT Received On  02/06/19  -CW      PT - Next Appointment  02/07/19  -CW        User Key  (r) = Recorded By, (t) = Taken By, (c) = Cosigned By    Initials Name Provider Type    Robi Lira PTA Physical Therapy Assistant        Therapy Suggested Charges     Code   Minutes Charges    None           Therapy Charges for Today     Code Description Service Date Service Provider Modifiers Qty    43662295478 HC PT THER PROC EA 15 MIN 2/5/2019 Robi Michel, PTA GP 1    03964125561 HC PT THER SUPP EA 15 MIN 2/5/2019 Robi Michel, PTA GP 1    72076471386 HC PT THER PROC EA 15 MIN 2/6/2019 Robi Michel, PTA GP 1    35466468883 HC PT THER SUPP EA 15 MIN 2/6/2019 Robi Michel, PTA GP 1          PT G-Codes  Outcome Measure Options: AM-PAC 6 Clicks Basic Mobility (PT)  AM-PAC 6 Clicks Score: 16    Robi Michel PTA  2/6/2019

## 2019-02-07 NOTE — PLAN OF CARE
Problem: Patient Care Overview  Goal: Plan of Care Review  Outcome: Ongoing (interventions implemented as appropriate)   02/07/19 1839   Coping/Psychosocial   Plan of Care Reviewed With patient   Plan of Care Review   Progress improving   OTHER   Outcome Summary Pt continues with bleeding around PEG tube. Dsg changed X3. Dr. Gómez assessed and gave orders to hold lovenox tomorrow. New orders for tube feeding based on pt oral intake. At lunch pt ate 75% initially then drank milk so feeding held. Up in chair most of the day. amb to BR with walker and st by assist. Will continue to monitor.       Problem: Fall Risk (Adult)  Goal: Absence of Fall  Outcome: Ongoing (interventions implemented as appropriate)      Problem: Skin Injury Risk (Adult)  Goal: Skin Health and Integrity  Outcome: Ongoing (interventions implemented as appropriate)      Problem: Anxiety (Adult)  Goal: Reduction/Resolution  Outcome: Ongoing (interventions implemented as appropriate)      Problem: Pain, Acute (Adult)  Goal: Acceptable Pain Control/Comfort Level  Outcome: Ongoing (interventions implemented as appropriate)      Problem: Nutrition, Enteral (Adult)  Goal: Signs and Symptoms of Listed Potential Problems Will be Absent, Minimized or Managed (Nutrition, Enteral)  Outcome: Ongoing (interventions implemented as appropriate)

## 2019-02-07 NOTE — PLAN OF CARE
Problem: Patient Care Overview  Goal: Plan of Care Review  Outcome: Ongoing (interventions implemented as appropriate)   02/07/19 6804   Coping/Psychosocial   Plan of Care Reviewed With patient   Plan of Care Review   Progress improving   OTHER   Outcome Summary Pt increasing with activity tolerance and amb safety with RWX. Pt also improved with transfer to RWX from sitting in chair

## 2019-02-07 NOTE — NURSING NOTE
" Unable to follow \"Order to Discontinue Indwelling Urinary Catheter in AM at 0600\" 02/07/19, Pt did not have an indwelling catheter when I was given report. The pt was voiding in a urinal and did void thru the 7p-7a shift.   "

## 2019-02-07 NOTE — PROGRESS NOTES
Name: Rosalio Rene ADMIT: 2019   : 1940  PCP: Erinn Howard MD    MRN: [id number] LOS: 8 days   AGE/SEX: 78 y.o. male  ROOM: [facility]/   Subjective   Reports blood around PEG site and that surgery is going to follow up with him today. No CP SOA NVD.    Objective   Vital Signs  Temp:  [98.4 °F (36.9 °C)-99.4 °F (37.4 °C)] 98.6 °F (37 °C)  Heart Rate:  [61-84] 84  Resp:  [18] 18  BP: (118-152)/(60-84) 118/60  SpO2:  [92 %-96 %] 92 %  on   ;   Device (Oxygen Therapy): room air  Body mass index is 27.38 kg/m².    Physical Exam   Constitutional: He is oriented to person, place, and time. He appears well-developed. No distress.   HENT:   Head: Atraumatic.   Nose: Nose normal.   Eyes: Conjunctivae and EOM are normal.   Neck: Normal range of motion. Neck supple.   Cardiovascular: Normal rate, regular rhythm and intact distal pulses.   Pulmonary/Chest: Effort normal. He has decreased breath sounds. He has no wheezes.   Abdominal: Soft. He exhibits no distension.   PEG in place   Musculoskeletal: He exhibits no edema or tenderness.   Neurological: He is alert and oriented to person, place, and time.   Skin: Skin is warm and dry. He is not diaphoretic.   Psychiatric: He has a normal mood and affect. His behavior is normal.   Nursing note and vitals reviewed.      Results Review:       I reviewed the patient's new clinical results.      Results from last 7 days   Lab Units 19  0456 1937 19  0656 19  0407   WBC 10*3/mm3 9.16 7.40 7.71 8.01   HEMOGLOBIN g/dL 11.8* 11.7* 12.5* 12.3*   PLATELETS 10*3/mm3 182 178 191 194     Results from last 7 days   Lab Units 19  0450 19  0456 19  0637 19  0656   SODIUM mmol/L 138 140 143 146*   POTASSIUM mmol/L 4.1 4.0 3.8 3.3*   CHLORIDE mmol/L 100 101 103 105   CO2 mmol/L 29.3* 26.2 28.7 28.8   BUN mg/dL 18 18 23 26*   CREATININE mg/dL 0.68* 0.79 0.88 1.05   GLUCOSE mg/dL 161* 168* 273* 228*   Estimated Creatinine  Clearance: 95.8 mL/min (A) (by C-G formula based on SCr of 0.68 mg/dL (L)).  Results from last 7 days   Lab Units 02/07/19  0450 02/06/19  0456 02/05/19  0637 02/04/19  0656   CALCIUM mg/dL 8.7 8.2* 8.5* 8.8   ALBUMIN g/dL  --   --  2.90* 3.00*   MAGNESIUM mg/dL  --   --  1.8  --          aspirin 81 mg Oral Daily   carvedilol 3.125 mg Per G Tube Q12H   enoxaparin 40 mg Subcutaneous Q24H   insulin glargine 10 Units Subcutaneous Nightly   insulin lispro 0-14 Units Subcutaneous 4x Daily With Meals & Nightly   MethylPREDNISolone 4 mg Oral TID Around Food   [START ON 2/8/2019] MethylPREDNISolone 4 mg Oral Before Breakfast   [START ON 2/8/2019] MethylPREDNISolone 4 mg Oral Tonight   [START ON 2/9/2019] MethylPREDNISolone 4 mg Oral Before Breakfast       lactated ringers 9 mL/hr Last Rate: 9 mL/hr (02/02/19 0927)   Diet Full Liquid; Consistent Carbohydrate      Assessment/Plan      Active Hospital Problems    Diagnosis Date Noted   • **Acute respiratory failure with hypoxia and hypercapnia (CMS/[facility]) [J96.01, J96.02] 02/03/2019   • COPD (chronic obstructive pulmonary disease) (CMS/[facility]) [J44.9] 02/03/2019   • Hypertension [I10] 02/03/2019   • Type 2 diabetes mellitus (CMS/[facility]) [E11.9] 02/03/2019   • Vocal cord paralysis [J38.00] 02/03/2019   • Aspiration pneumonia of both lower lobes (healthcare associated) [J69.0] 02/03/2019   • Hypokalemia [E87.6] 02/03/2019   • Decubitus ulcer of coccyx, unspecified pressure ulcer stage [L89.159] 02/03/2019   • Laryngeal cancer (CMS/[facility]) [C32.9] 01/29/2019   • Oropharyngeal dysphagia [R13.12] 01/24/2019      Resolved Hospital Problems   No resolved problems to display.     - Laryngeal Cancer: On steroid taper. Sp PEG. Ok to ADAT while also getting TF. Patient had declined tracheostomy. ENT and Surgery following. Dr Godoy, Oncology, to see in clinic.  - Aspiration PNA: Completed antibiotic course. Appreciate Pulmonology evaluation.  - Acute Hypoxemic/Hypercapnic Respiratory failure:  Resolved.  - DM2: A1c 6.6 indicating good control on home regimen. Continue insulin therapy here. Elevation due to steroid and expect improvement with continued taper.  - Urinary Retention: Voiding fine. He had condom cath, not saba.  - Weakness: Continue PT/OT. SNF recommended at this time.  - Disposition: SNF vs /possibly tomorrow    Reese Fairbanks MD  [facility]  02/07/19  2:25 PM

## 2019-02-07 NOTE — THERAPY TREATMENT NOTE
Acute Care - Physical Therapy Treatment Note  [facility]     Patient Name: Rosalio Rene  : 1940  MRN: [id number]  Today's Date: 2019  Onset of Illness/Injury or Date of Surgery: 19          Admit Date: 2019    Visit Dx:    ICD-10-CM ICD-9-CM   1. Acute respiratory failure with hypercapnia (CMS/HCC) J96.02 518.81   2. Laryngeal cancer (CMS/HCC) C32.9 161.9   3. 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)   • Laryngeal cancer (CMS/HCC)   • Acute respiratory failure with hypercapnia (CMS/HCC)   • Oropharyngeal dysphagia   • COPD (chronic obstructive pulmonary disease) (CMS/HCC)   • Hypertension   • Type 2 diabetes mellitus (CMS/HCC)   • Vocal cord paralysis   • Aspiration pneumonia of both lower lobes (healthcare associated)   • Acute respiratory failure with hypoxia and hypercapnia (CMS/HCC)   • Hypokalemia   • Decubitus ulcer of coccyx, unspecified pressure ulcer stage       Therapy Treatment    Rehabilitation Treatment Summary     Row Name 19 1300             Treatment Time/Intention    Discipline  physical therapy assistant  -CW      Document Type  therapy note (daily note)  -CW      Subjective Information  no complaints  -CW      Mode of Treatment  physical therapy  -CW      Therapy Frequency (PT Clinical Impression)  daily  -CW      Patient Effort  good  -CW      Existing Precautions/Restrictions  fall  -CW      Recorded by [CW] Robi Michel PTA 19 1332      Row Name 19 1300             Vital Signs    O2 Delivery Pre Treatment  room air  -CW      Recorded by [CW] Robi Michel PTA 19 1332      Row Name 19 1300              Cognitive Assessment/Intervention- PT/OT    Orientation Status (Cognition)  oriented x 4  -CW      Follows Commands (Cognition)  WNL  -CW      Personal Safety Interventions  fall prevention program maintained;gait belt;muscle strengthening facilitated;nonskid shoes/slippers when out of bed  -CW      Recorded by [CW] Robi Michel, PTA 02/07/19 1332      Row Name 02/07/19 1300             Bed Mobility Assessment/Treatment    Supine-Sit [place] (Bed Mobility)  not tested  -CW      Comment (Bed Mobility)  up in chair  -CW      Recorded by [CW] Robi Michel, PTA 02/07/19 1332      Row Name 02/07/19 1300             Transfer Assessment/Treatment    Transfer Assessment/Treatment  stand-sit transfer;sit-stand transfer  -CW      Recorded by [CW] Robi Michel, PTA 02/07/19 1332      Row Name 02/07/19 1300             Sit-Stand Transfer    Sit-Stand [place] (Transfers)  contact guard  -CW      Assistive Device (Sit-Stand Transfers)  walker, front-wheeled  -CW      Recorded by [CW] Robi Michel, PTA 02/07/19 1332      Row Name 02/07/19 1300             Stand-Sit Transfer    Stand-Sit [place] (Transfers)  contact guard  -CW      Assistive Device (Stand-Sit Transfers)  walker, front-wheeled  -CW      Recorded by [CW] Robi Michel, PTA 02/07/19 1332      Row Name 02/07/19 1300             Gait/Stairs Assessment/Training    [place] Level (Gait)  contact guard  -CW      Assistive Device (Gait)  walker, front-wheeled  -CW      Distance in Feet (Gait)  80  -CW      Pattern (Gait)  step-through  -CW      Recorded by [CW] Robi Michel, PTA 02/07/19 1332      Row Name 02/07/19 1300             Therapeutic Exercise    Lower Extremity (Therapeutic Exercise)  gluteal sets;LAQ (long arc quad), bilateral;marching while seated  -CW      Lower Extremity Range of Motion (Therapeutic Exercise)  ankle dorsiflexion/plantar flexion, bilateral  -CW      Exercise Type (Therapeutic  Exercise)  AROM (active range of motion)  -CW      Position (Therapeutic Exercise)  seated  -CW      Sets/Reps (Therapeutic Exercise)  10  -CW      Recorded by [CW] Robi Michel PTA 02/07/19 1332      Row Name 02/07/19 1300             Positioning and Restraints    Pre-Treatment Position  sitting in chair/recliner  -CW      Post Treatment Position  chair  -CW      In Chair  notified nsg;sitting;call light within reach;encouraged to call for assist;with family/caregiver  -CW      Recorded by [CW] Robi Michel, PTA 02/07/19 1332      Row Name                Wound 01/30/19 0800 Left gluteal pressure injury    Wound - Properties Group Date first assessed: 01/30/19 [MS] Time first assessed: 0800 [MS] Present On Admission : yes [MS] Side: Left [MS] Location: gluteal [MS] Type: pressure injury [MS] Stage, Pressure Injury: Stage 2 [MS2] Recorded by:  [MS] Arina Kirk RN 01/30/19 1523 [MS2] Arina Kirk RN 01/30/19 1524    Row Name                Wound 01/30/19 0800 Right gluteal pressure injury    Wound - Properties Group Date first assessed: 01/30/19 [MS] Time first assessed: 0800 [MS] Present On Admission : yes [MS] Side: Right [MS] Location: gluteal [MS] Type: pressure injury [MS] Stage, Pressure Injury: Stage 2 [MS] Recorded by:  [MS] Arina Kirk RN 01/30/19 1524    Row Name                Wound 01/30/19 0800 Bilateral gluteal pressure injury    Wound - Properties Group Date first assessed: 01/30/19 [MS] Time first assessed: 0800 [MS] Present On Admission : yes [MS] Side: Bilateral [MS] Location: gluteal [MS] Type: pressure injury [MS] Stage, Pressure Injury: deep tissue injury [MS] Recorded by:  [MS] Arina Kirk RN 01/30/19 1525    Row Name                Wound 02/02/19 1013 Other (See comments) abdomen incision    Wound - Properties Group Date first assessed: 02/02/19 [MC] Time first assessed: 1013 [MC] Side: Other (See comments) [MC] Location: abdomen [MC] Type: incision [MC] Recorded by:  [MC]  Madrigal, Lucinda, RN 02/02/19 1013    Row Name 02/07/19 1300             Outcome Summary/Treatment Plan (PT)    Anticipated Discharge Disposition (PT)  skilled nursing facility  -CW      Recorded by [CW] Robi Michel, PTA 02/07/19 1332        User Key  (r) = Recorded By, (t) = Taken By, (c) = Cosigned By    Initials Name Effective Dates Discipline    Arina Hill RN 06/16/16 -  Nurse    CW Robi Michel, PTA 03/07/18 -  PT     Lucinda Madrigal RN 02/23/18 -  Nurse          Wound 01/30/19 0800 Left gluteal pressure injury (Active)   Dressing Appearance moist drainage 2/6/2019  7:20 PM   Periwound intact 2/6/2019  7:20 PM   Periwound Temperature warm 2/6/2019  7:20 PM   Periwound Skin Turgor soft 2/6/2019  7:20 PM   Edges irregular 2/6/2019  7:20 PM   Drainage Amount scant 2/6/2019  7:20 PM   Dressing Care, Wound dressing changed 2/6/2019  7:20 PM       Wound 01/30/19 0800 Right gluteal pressure injury (Active)   Dressing Appearance dry;intact 2/7/2019  7:25 AM   Closure JILLIAN 2/7/2019  7:25 AM   Base dressing in place, unable to visualize 2/7/2019  7:25 AM   Periwound dry 2/6/2019  7:20 PM   Periwound Temperature warm 2/6/2019  7:20 PM   Periwound Skin Turgor soft 2/6/2019  7:20 PM   Edges irregular 2/6/2019  7:20 PM   Drainage Amount none 2/6/2019  7:20 PM   Dressing Care, Wound dressing changed 2/6/2019  7:20 PM       Wound 01/30/19 0800 Bilateral gluteal pressure injury (Active)   Dressing Appearance intact;no drainage 2/7/2019  7:25 AM   Closure JILLIAN 2/7/2019  7:25 AM   Base dressing in place, unable to visualize 2/7/2019  7:25 AM   Periwound moist;edematous;blanchable 2/6/2019  7:20 PM   Periwound Temperature warm 2/6/2019  7:20 PM   Periwound Skin Turgor soft 2/6/2019  7:20 PM   Dressing Care, Wound dressing applied 2/6/2019  7:20 PM       Wound 02/02/19 1013 Other (See comments) abdomen incision (Active)   Dressing Appearance dry;intact 2/7/2019  7:25 AM   Periwound Temperature warm 2/6/2019   7:20 PM   Periwound Skin Turgor soft 2/6/2019  7:20 PM   Drainage Amount none 2/6/2019  7:20 PM           Physical Therapy Education     Title: PT OT SLP Therapies (Done)     Topic: Physical Therapy (Done)     Point: Mobility training (Done)     Learning Progress Summary           Patient Acceptance, TB,E, VU,DU by CW at 2/7/2019  1:33 PM    Acceptance, E,TB, VU,DU by CW at 2/6/2019  3:57 PM    Acceptance, E,TB, VU,DU by CW at 2/5/2019  4:27 PM    Acceptance, E, NR by CA at 2/4/2019 12:10 PM    Acceptance, E, NR by AR at 2/3/2019  3:20 PM                   Point: Home exercise program (Done)     Learning Progress Summary           Patient Acceptance, TB,E, VU,DU by CW at 2/7/2019  1:33 PM    Acceptance, E,TB, VU,DU by CW at 2/6/2019  3:57 PM    Acceptance, E,TB, VU,DU by CW at 2/5/2019  4:27 PM    Acceptance, E, NR by CA at 2/4/2019 12:10 PM    Acceptance, E, NR by AR at 2/3/2019  3:20 PM                   Point: Body mechanics (Done)     Learning Progress Summary           Patient Acceptance, TB,E, VU,DU by CW at 2/7/2019  1:33 PM    Acceptance, E,TB, VU,DU by CW at 2/6/2019  3:57 PM    Acceptance, E,TB, VU,DU by CW at 2/5/2019  4:27 PM    Acceptance, E, NR by CA at 2/4/2019 12:10 PM    Acceptance, E, NR by AR at 2/3/2019  3:20 PM                   Point: Precautions (Done)     Learning Progress Summary           Patient Acceptance, TB,E, VU,DU by CW at 2/7/2019  1:33 PM    Acceptance, E,TB, VU,DU by CW at 2/6/2019  3:57 PM    Acceptance, E,TB, VU,DU by CW at 2/5/2019  4:27 PM    Acceptance, E, NR by LL at 2/5/2019 10:09 AM    Acceptance, E, NR by CA at 2/4/2019 12:10 PM    Acceptance, E, NR by AR at 2/3/2019  3:20 PM                               User Key     Initials Effective Dates Name Provider Type Discipline    AR 04/03/18 -  Alyse Paul, PT Physical Therapist PT    CW 03/07/18 -  Robi Michel, PTA Physical Therapy Assistant PT    CA 06/13/18 -  Carina Stahl, PT Physical Therapist PT    LL  07/13/18 -  Tammy Higginbotham RN Registered Nurse Nurse                PT Recommendation and Plan  Anticipated Discharge Disposition (PT): skilled nursing facility  Therapy Frequency (PT Clinical Impression): daily  Outcome Summary/Treatment Plan (PT)  Anticipated Discharge Disposition (PT): skilled nursing facility  Plan of Care Reviewed With: patient  Progress: improving  Outcome Summary: Pt increasing with actiivty otlerance and amb safety with RWX.  Pt also improved with transfer to RWX from sitting in chair  Outcome Measures     Row Name 02/07/19 1300 02/06/19 1500 02/05/19 1600       How much help from another person do you currently need...    Turning from your back to your side while in flat bed without using bedrails?  3  -CW  3  -CW  3  -CW    Moving from lying on back to sitting on the side of a flat bed without bedrails?  3  -CW  3  -CW  3  -CW    Moving to and from a bed to a chair (including a wheelchair)?  3  -CW  3  -CW  3  -CW    Standing up from a chair using your arms (e.g., wheelchair, bedside chair)?  3  -CW  3  -CW  3  -CW    Climbing 3-5 steps with a railing?  1  -CW  1  -CW  1  -CW    To walk in hospital room?  3  -CW  3  -CW  3  -CW    AM-PAC 6 Clicks Score  16  -CW  16  -CW  16  -CW       Functional Assessment    Outcome Measure Options  AM-PAC 6 Clicks Basic Mobility (PT)  -CW  AM-PAC 6 Clicks Basic Mobility (PT)  -CW  AM-PAC 6 Clicks Basic Mobility (PT)  -CW      User Key  (r) = Recorded By, (t) = Taken By, (c) = Cosigned By    Initials Name Provider Type    Robi Lira, PTA Physical Therapy Assistant         Time Calculation:   PT Charges     Row Name 02/07/19 1334             Time Calculation    Start Time  1307  -CW      Stop Time  1334  -CW      Time Calculation (min)  27 min  -CW      PT Received On  02/07/19  -CW      PT - Next Appointment  02/08/19  -CW        User Key  (r) = Recorded By, (t) = Taken By, (c) = Cosigned By    Initials Name Provider Type    ASHWIN Michel  Robi SELBY PTA Physical Therapy Assistant        Therapy Suggested Charges     Code   Minutes Charges    None           Therapy Charges for Today     Code Description Service Date Service Provider Modifiers Qty    66673945514 HC PT THER PROC EA 15 MIN 2/6/2019 Robi Michel, KATTY GP 1    10712935743 HC PT THER SUPP EA 15 MIN 2/6/2019 Robi Michel, KATTY GP 1    31429780194 HC PT THER PROC EA 15 MIN 2/7/2019 Robi Michel, KATTY GP 2          PT G-Codes  Outcome Measure Options: AM-PAC 6 Clicks Basic Mobility (PT)  AM-PAC 6 Clicks Score: 16    Robi Michel PTA  2/7/2019

## 2019-02-07 NOTE — PROGRESS NOTES
Postoperative day #5 placement of PEG    Subjective:  Called by patient's nurse to see patient because of some bleeding around his PEG tube.  There was apparently multiple clots and some oozing earlier.  Dressing is been changed twice today.    Objective: Patient has been afebrile with stable vitals  Abdomen is soft and benign.  Dressing around G-tube is saturated with some red blood and a small amount of clot.  When I removed this there is no active bleeding seen and no significant bruising on the abdominal wall.    Assessment and plan:  Postoperative day 5 placement of PEG  Stable to small bowel bleeding around the tube, none through the tube.  Agree with rechecking hemoglobin tomorrow.  Continue with pressure with dressings and abdominal binder.  Okay to continue to use G-tube.  Would recommend holding Lovenox for at least tomorrow to see how he does off anticoagulants.    Quinn Gómez MD  General and Endoscopic Surgery  [facility]    [address], [address]  [place], [place], [address]  P: [phone]  F: [phone]

## 2019-02-07 NOTE — PROGRESS NOTES
Adult Nutrition  Assessment/PES    Patient Name:  Rosalio Rene  YOB: 1940  MRN: [id number]  Admit Date:  1/29/2019    Assessment Date:  2/7/2019      Follow up-patient advanced to full liquids and tolerating-consumed about 350 kcal/15 gm protein for breakfast.   Nutren 1.5 360 mL 4x/day; 360 mL around breakfast, lunch and dinner and Q HS; if po intake >50% of meal-do half bolus (180 mL), if pt consumes 100% of meal-hold bolus.  RD to continue to follow.    Reason for Assessment     Row Name 02/07/19 1051          Reason for Assessment    Reason For Assessment  follow-up protocol     Identified At Risk by Screening Criteria  tube feeding or parenteral nutrition           Anthropometrics     Row Name 02/07/19 0528          Anthropometrics    Weight  89 kg (196 lb 3.2 oz)         Labs/Tests/Procedures/Meds     Row Name 02/07/19 1051          Labs/Procedures/Meds    Lab Results Reviewed  reviewed, pertinent        Diagnostic Tests/Procedures    Diagnostic Test/Procedure Reviewed  reviewed, pertinent        Medications    Pertinent Medications Reviewed  reviewed, pertinent         Physical Findings     Row Name 02/07/19 1051          Physical Findings    Gastrointestinal  feeding tube     Tubes  PEG           Nutrition Prescription Ordered     Row Name 02/07/19 1051          Nutrition Prescription PO    Current PO Diet  Full Liquid     Common Modifiers  Consistent Carbohydrate        Nutrition Prescription EN    Enteral Route  PEG     Product  Nutren 1.5 (Osmolite 1.5)     TF Delivery Method  Bolus     TF Bolus Goal Volume (mL)  360 mL     TF Bolus Frequency  4 times a day     Water flush (mL)   100 mL     Water Flush Frequency  Every feeding         Evaluation of Received Nutrient/Fluid Intake     Row Name 02/07/19 1052          Calories Evaluation    Enteral Calories (kcal)  2160     Oral Calories (kcal)  350        Protein Evaluation    Enteral Protein (gm)  108     Oral Protein (gm)  15            Problem/Interventions:      Intervention Goal     Row Name 02/07/19 1052          Intervention Goal    General  Maintain nutrition;Nutrition support treatment     PO  Tolerate PO;Increase intake;Advance diet     Weight  Maintain weight         Nutrition Intervention     Row Name 02/07/19 1052          Nutrition Intervention    RD/Tech Action  Interview for preference;Follow Tx progress;Care plan reviewd;Recommend/ordered;Supplement provided;Encourage intake     Recommended/Ordered  Supplement         Nutrition Prescription     Row Name 02/07/19 1053          Nutrition Prescription PO    PO Prescription  Begin/change supplement     Supplement  Boost Glucose Control     Supplement Frequency  3 times a day     New PO Prescription Ordered?  Yes             Electronically signed by:  Toyin Montiel RD  02/07/19 10:53 AM

## 2019-02-07 NOTE — THERAPY EVALUATION
Acute Care - Occupational Therapy Initial Evaluation  [facility]     Patient Name: Rosalio Rene  : 1940  MRN: [id number]  Today's Date: 2019  Onset of Illness/Injury or Date of Surgery: 19          Admit Date: 2019       ICD-10-CM ICD-9-CM   1. Acute respiratory failure with hypercapnia (CMS/HCC) J96.02 518.81   2. Laryngeal cancer (CMS/HCC) C32.9 161.9   3. 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)   • Laryngeal cancer (CMS/HCC)   • Acute respiratory failure with hypercapnia (CMS/HCC)   • Oropharyngeal dysphagia   • COPD (chronic obstructive pulmonary disease) (CMS/HCC)   • Hypertension   • Type 2 diabetes mellitus (CMS/HCC)   • Vocal cord paralysis   • Aspiration pneumonia of both lower lobes (healthcare associated)   • Acute respiratory failure with hypoxia and hypercapnia (CMS/HCC)   • Hypokalemia   • Decubitus ulcer of coccyx, unspecified pressure ulcer stage     Past Medical History:   Diagnosis Date   • Abdominal cramping     AT TIMES   • Anxiety    • Cellulitis     BILATERAL LOWER EXTREMITIES   • Chronic cough    • COPD (chronic obstructive pulmonary disease) (CMS/HCC)    • Dysphasia    • Edema, lower extremity    • Enlarged heart     PER PATIENT?? STATES SAW CARDIOLOGIST YRS AGO BUT DOES NOT REMEMBER NAME    • History of laryngeal cancer     HISTORY OF LARYNGECTOMY, RADIATION   • Hyperlipidemia    • Hypertension    • Hypogonadism male    • Type 2 diabetes mellitus (CMS/HCC)    • Vocal cord paralysis      Past Surgical History:   Procedure Laterality Date   • CHOLECYSTECTOMY     • ENDOSCOPY N/A 2019    Procedure: ESOPHAGOGASTRODUODENOSCOPY;   Surgeon: Twin Bobby MD;  Location: [facility] OR;  Service: Gastroenterology   • LARYNGOSCOPY N/A 1/29/2019    Procedure: DIRECT SUSPENSION MICROLARYNGOSCOPY BIOPSY AND POSSIBLE CERVICE ESOPHAGOSCOPY;  Surgeon: Michael Bhagat MD;  Location: [facility] OR;  Service: ENT   • PEG TUBE INSERTION N/A 2/2/2019    Procedure: PERCUTANEOUS ENDOSCOPIC GASTROSTOMY TUBE INSERTION;  Surgeon: Twin Bobby MD;  Location: [facility] OR;  Service: Gastroenterology   • PROSTATECTOMY  2004   • TOTAL LARYNGECTOMY  2005          OT ASSESSMENT FLOWSHEET (last 72 hours)      Occupational Therapy Evaluation     Row Name 02/07/19 1417                   OT Evaluation Time/Intention    Subjective Information  complains of;fatigue  -SG        Document Type  evaluation  -SG        Mode of Treatment  occupational therapy  -SG        Patient Effort  good  -SG           General Information    Patient Profile Reviewed?  yes  -SG        Patient Observations  agree to therapy;alert;cooperative  -SG        Prior Level of Function  independent:;ADL's  -SG        Equipment Currently Used at Home  cane, straight  -SG        Existing Precautions/Restrictions  fall abdominal binder  -SG           Cognitive Assessment/Intervention- PT/OT    Orientation Status (Cognition)  oriented x 4  -SG        Follows Commands (Cognition)  WFL  -SG           Bed Mobility Assessment/Treatment    Comment (Bed Mobility)  sitting in chair  -SG           Functional Mobility    Functional Mobility- Comment  not performed as nsg states waiting on abd. binder due to bleeding at PEG site  -SG           ADL Assessment/Intervention    BADL Assessment/Intervention  upper body dressing;lower body dressing;grooming  -SG           Upper Body Dressing Assessment/Training    Upper Body Dressing [place] Level  supervision  -SG        Upper Body Dressing Position  supported sitting  -SG           Lower Body Dressing Assessment/Training    Lower Body  Dressing [place] Level  moderate assist (50% patient effort)  -SG        Lower Body Dressing Position  supported sitting  -SG        Comment (Lower Body Dressing)  educated pt and family with AE to assist . Family states pt will have assist as needed  -SG           BADL Safety/Performance    Impairments, BADL Safety/Performance  endurance/activity tolerance abdominal pain limits LE adls  -SG           General ROM    GENERAL ROM COMMENTS  VIANEY's WFL AROM  -SG           Positioning and Restraints    Pre-Treatment Position  sitting in chair/recliner  -SG        Post Treatment Position  chair  -SG        In Chair  sitting;call light within reach;encouraged to call for assist;exit alarm on;with family/caregiver  -SG           Wound 01/30/19 0800 Left gluteal pressure injury    Wound - Properties Group Date first assessed: 01/30/19  -MS Time first assessed: 0800  -MS Present On Admission : yes  -MS Side: Left  -MS Location: gluteal  -MS Type: pressure injury  -MS Stage, Pressure Injury: Stage 2  -MS       Wound 01/30/19 0800 Right gluteal pressure injury    Wound - Properties Group Date first assessed: 01/30/19  -MS Time first assessed: 0800  -MS Present On Admission : yes  -MS Side: Right  -MS Location: gluteal  -MS Type: pressure injury  -MS Stage, Pressure Injury: Stage 2  -MS       Wound 01/30/19 0800 Bilateral gluteal pressure injury    Wound - Properties Group Date first assessed: 01/30/19  -MS Time first assessed: 0800  -MS Present On Admission : yes  -MS Side: Bilateral  -MS Location: gluteal  -MS Type: pressure injury  -MS Stage, Pressure Injury: deep tissue injury  -MS       Wound 02/02/19 1013 Other (See comments) abdomen incision    Wound - Properties Group Date first assessed: 02/02/19  -MC Time first assessed: 1013  -MC Side: Other (See comments)  -MC Location: abdomen  -MC Type: incision  -MC       Clinical Impression (OT)    OT Diagnosis  need for assist with personal care  -SG        Criteria for  Skilled Therapeutic Interventions Met (OT Eval)  yes;treatment indicated  -SG        Therapy Frequency (OT Eval)  5 times/wk  -SG        Care Plan Review (OT)  evaluation/treatment results reviewed  -SG           Planned OT Interventions    Planned Therapy Interventions (OT Eval)  activity tolerance training;BADL retraining;transfer/mobility retraining  -SG           OT Goals    Transfer Goal Selection (OT)  transfer, OT goal 1  -SG        Dressing Goal Selection (OT)  dressing, OT goal 1  -SG        Activity Tolerance Goal Selection (OT)  activity tolerance, OT goal 1  -SG        Additional Documentation  Activity Tolerance Goal Selection (OT) (Row)  -SG           Transfer Goal 1 (OT)    Activity/Assistive Device (Transfer Goal 1, OT)  toilet  -SG        [place] Level/Cues Needed (Transfer Goal 1, OT)  supervision required  -SG        Time Frame (Transfer Goal 1, OT)  1 week  -SG        Progress/Outcome (Transfer Goal 1, OT)  goal ongoing  -SG           Dressing Goal 1 (OT)    Activity/Assistive Device (Dressing Goal 1, OT)  lower body dressing  -SG        [place]/Cues Needed (Dressing Goal 1, OT)  minimum assist (75% or more patient effort)  -SG        Time Frame (Dressing Goal 1, OT)  1 week  -SG        Progress/Outcome (Dressing Goal 1, OT)  goal ongoing  -SG            Activity Tolerance Goal 1 (OT)    Activity Tolerance Goal 1 (OT)  increase activity tolerance to assist with adls  -SG        Activity Level (Endurance Goal 1, OT)  20 min activity  -SG        Time Frame (Activity Tolerance Goal 1, OT)  1 week  -SG        Progress/Outcome (Activity Tolerance Goal 1, OT)  goal ongoing  -SG          User Key  (r) = Recorded By, (t) = Taken By, (c) = Cosigned By    Initials Name Effective Dates    Jaz Cagle OTR 06/08/18 -     Arina Hill, SHMUEL 06/16/16 -     Lucinda Burr RN 02/23/18 -          Occupational Therapy Education     Title: PT OT SLP Therapies (In Progress)     Topic:  Occupational Therapy (In Progress)     Point: ADL training (Done)     Description: Instruct learner(s) on proper safety adaptation and remediation techniques during self care or transfers.   Instruct in proper use of assistive devices.    Learning Progress Summary           Patient Acceptance, E,TB, VU,NR by  at 2/7/2019  2:24 PM                               User Key     Initials Effective Dates Name Provider Type Discipline     06/08/18 -  Jaz Rose, OTGERSON Occupational Therapist OT                  OT Recommendation and Plan  Planned Therapy Interventions (OT Eval): activity tolerance training, BADL retraining, transfer/mobility retraining  Therapy Frequency (OT Eval): 5 times/wk  Plan of Care Review  Plan of Care Reviewed With: patient, family  Plan of Care Reviewed With: patient, family  Outcome Summary: Pt present with decreased strength and endurance which limits safety and independence for adls. May benefit from AE teaching. Will continue to follow for OT    Outcome Measures     Row Name 02/07/19 1425 02/07/19 1300 02/06/19 1500       How much help from another person do you currently need...    Turning from your back to your side while in flat bed without using bedrails?  --  3  -CW  3  -CW    Moving from lying on back to sitting on the side of a flat bed without bedrails?  --  3  -CW  3  -CW    Moving to and from a bed to a chair (including a wheelchair)?  --  3  -CW  3  -CW    Standing up from a chair using your arms (e.g., wheelchair, bedside chair)?  --  3  -CW  3  -CW    Climbing 3-5 steps with a railing?  --  1  -CW  1  -CW    To walk in hospital room?  --  3  -CW  3  -CW    AM-PAC 6 Clicks Score  --  16  -CW  16  -CW       How much help from another is currently needed...    Putting on and taking off regular lower body clothing?  2  -SG  --  --    Bathing (including washing, rinsing, and drying)  2  -SG  --  --    Toileting (which includes using toilet bed pan or urinal)  3  -SG  --  --     Putting on and taking off regular upper body clothing  3  -SG  --  --    Taking care of personal grooming (such as brushing teeth)  3  -SG  --  --    Eating meals  1  -SG  --  --    Score  14  -SG  --  --       Functional Assessment    Outcome Measure Options  AM-PAC 6 Clicks Daily Activity (OT)  -SG  AM-PAC 6 Clicks Basic Mobility (PT)  -CW  AM-PAC 6 Clicks Basic Mobility (PT)  -CW    Row Name 02/05/19 1600             How much help from another person do you currently need...    Turning from your back to your side while in flat bed without using bedrails?  3  -CW      Moving from lying on back to sitting on the side of a flat bed without bedrails?  3  -CW      Moving to and from a bed to a chair (including a wheelchair)?  3  -CW      Standing up from a chair using your arms (e.g., wheelchair, bedside chair)?  3  -CW      Climbing 3-5 steps with a railing?  1  -CW      To walk in hospital room?  3  -CW      AM-PAC 6 Clicks Score  16  -CW         Functional Assessment    Outcome Measure Options  AM-PAC 6 Clicks Basic Mobility (PT)  -CW        User Key  (r) = Recorded By, (t) = Taken By, (c) = Cosigned By    Initials Name Provider Type    Jaz Cagle OTR Occupational Therapist    CW Robi Michel, PTA Physical Therapy Assistant          Time Calculation:   Time Calculation- OT     Row Name 02/07/19 1426             Time Calculation- OT    OT Start Time  1259  -      OT Stop Time  1316  -      OT Time Calculation (min)  17 min  -      Total Timed Code Minutes- OT  9 minute(s)  -      OT Received On  02/07/19  -      OT Goal Re-Cert Due Date  02/14/19  -        User Key  (r) = Recorded By, (t) = Taken By, (c) = Cosigned By    Initials Name Provider Type    Jaz Cagle OTR Occupational Therapist        Therapy Suggested Charges     Code   Minutes Charges    None           Therapy Charges for Today     Code Description Service Date Service Provider Modifiers Qty    21788993814  OT  EVAL LOW COMPLEXITY 2 2/7/2019 Jaz Rose, BRADY GO 1    39471266327 HC OT SELF CARE/MGMT/TRAIN EA 15 MIN 2/7/2019 Jaz Rose OTR GO 1               BRADY Hall  2/7/2019

## 2019-02-07 NOTE — PROGRESS NOTES
Continued Stay Note  [facility]     Patient Name: Rosalio Rene  MRN: [id number]  Today's Date: 2/7/2019    Admit Date: 1/29/2019    Discharge Plan     Row Name 02/07/19 1122       Plan    Plan  Disposition?    Patient/Family in Agreement with Plan  yes    Plan Comments  Attempted x2 to discuss dc plans. Last evening he requested [facility] to f/u in am. This morning he says he had spoken with family at this time. Refused to allow [facility]CP to call family per phone. Will continue to follow.         Discharge Codes    No documentation.             Xi Beltran RN

## 2019-02-07 NOTE — PLAN OF CARE
Problem: Patient Care Overview  Goal: Plan of Care Review  Outcome: Ongoing (interventions implemented as appropriate)   02/07/19 9016   Coping/Psychosocial   Plan of Care Reviewed With patient;family   OTHER   Outcome Summary Pt present with decreased strength and endurance which limits safety and independence for adls. May benefit from AE teaching. Will continue to follow for OT

## 2019-02-07 NOTE — PLAN OF CARE
Problem: Patient Care Overview  Goal: Plan of Care Review  Outcome: Ongoing (interventions implemented as appropriate)   02/07/19 0640   Coping/Psychosocial   Plan of Care Reviewed With patient   Plan of Care Review   Progress no change   OTHER   Outcome Summary Pt bolus feed tolerated well. Very little risidua. Bolus feeds q4h begin at 0800.

## 2019-02-08 NOTE — PROGRESS NOTES
Continued Stay Note  [facility]     Patient Name: Rosalio Rene  MRN: [id number]  Today's Date: 2/8/2019    Admit Date: 1/29/2019    Discharge Plan     Row Name 02/08/19 1338       Plan    Plan  Home with family and [facility].    Patient/Family in Agreement with Plan  yes    Plan Comments  Spoke with patient at bedside regarding dc plans. Agreeable with [facility] JOSE GUADALUPE and [employer] for rolling walker. Referral called to [facility] and Tammy/[facility] regarding order for walker. Left voice messages. Await call back. Continue to follow.        Discharge Codes    No documentation.             Xi Beltran RN

## 2019-02-08 NOTE — PROGRESS NOTES
Continued Stay Note  Psychiatric     Patient Name: Rosalio Rene  MRN: [id number]  Today's Date: 2/8/2019    Admit Date: 1/29/2019    Discharge Plan     Row Name 02/08/19 8942       Plan    Plan Comments  Luisa/[facility] returned call. States will follow to arrange f/u at home. Says tube feed is not covered by insurance and is $60.00/case. Spoke with patient at bedside. States able to afford. Tube feed to be delivered to bedside. Spoke with Tammy[facility] regarding. Requested order to be faxed and Sebree and walker will be delivered before dc. No additional needs noted. Continue to follow.    Row Name 02/08/19 0886       Plan    Plan  Home with family and [facility].    Patient/Family in Agreement with Plan  yes    Plan Comments  Spoke with patient at bedside regarding dc plans. Agreeable with [facility] and [employer] for rolling walker. Referral called to [facility]. Left voice message. Await call back. Informed Tammy[facility] regarding order for walker. Continue to follow.        Discharge Codes    No documentation.             Xi Beltran RN

## 2019-02-08 NOTE — PLAN OF CARE
Problem: Patient Care Overview  Goal: Plan of Care Review  Outcome: Ongoing (interventions implemented as appropriate)   02/08/19 0630   Coping/Psychosocial   Plan of Care Reviewed With patient   Plan of Care Review   Progress improving   OTHER   Outcome Summary Pt abdoulaye full liquid diet. Eating 100%. Feedings held per orders. Up in chair most of day. Amb to BR with walker and st by assist. Denies c/o pain. Plan is home with home health. Will continue to monitor       Problem: Fall Risk (Adult)  Goal: Absence of Fall  Outcome: Ongoing (interventions implemented as appropriate)      Problem: Skin Injury Risk (Adult)  Goal: Skin Health and Integrity  Outcome: Ongoing (interventions implemented as appropriate)      Problem: Pain, Acute (Adult)  Goal: Acceptable Pain Control/Comfort Level  Outcome: Ongoing (interventions implemented as appropriate)      Problem: Dysphagia (Adult)  Goal: Functional/Safe Swallow  Outcome: Ongoing (interventions implemented as appropriate)      Problem: Nutrition, Enteral (Adult)  Goal: Signs and Symptoms of Listed Potential Problems Will be Absent, Minimized or Managed (Nutrition, Enteral)  Outcome: Ongoing (interventions implemented as appropriate)

## 2019-02-08 NOTE — SIGNIFICANT NOTE
02/08/19 1624   Rehab Treatment   Discipline physical therapy assistant   Reason Treatment Not Performed patient/family declined treatment, not feeling well  (Pt states that he had a rough night and wants to sleep.  RN Lorri is aware.  PT to follow up tomorrow.)   Recommendation   PT - Next Appointment 02/09/19

## 2019-02-08 NOTE — PLAN OF CARE
Problem: Nutrition, Enteral (Adult)  Intervention: Monitor/Manage Nutrition Support  Follow up-spoke with patient and nephew who will be helping with care if the patient goes home; went over TF recommendations for now until starting chemotherapy when po intake may decrease.  TF regimen: Nutren 1.5 hold bolus if patient consumes 100% of meals (equivalent to 500 kcal); if he consumes 50% of meal, provide 180 mL bolus; if he consumes 0%, give 360 mL bolus. Continue to give q HS bolus  He has contact information for outpatient RD

## 2019-02-08 NOTE — PROGRESS NOTES
Name: Rosalio Rene ADMIT: 2019   : 1940  PCP: Erinn Howard MD    MRN: [id number] LOS: 9 days   AGE/SEX: 79 y.o. male  ROOM: [facility]   Subjective   No CP SOA NVD. Tolerating TF. Bleeding has improved from around his PEG.    Objective   Vital Signs  Temp:  [97 °F (36.1 °C)-98.6 °F (37 °C)] 98.3 °F (36.8 °C)  Heart Rate:  [64-78] 78  Resp:  [16-18] 16  BP: (120-137)/(66-81) 135/81  SpO2:  [90 %-100 %] 98 %  on   ;   Device (Oxygen Therapy): room air  Body mass index is 27.15 kg/m².    Physical Exam   Constitutional: He is oriented to person, place, and time. He appears well-developed. No distress.   HENT:   Head: Atraumatic.   Nose: Nose normal.   Eyes: Conjunctivae and EOM are normal.   Neck: Normal range of motion. Neck supple.   Cardiovascular: Normal rate, regular rhythm and intact distal pulses.   Pulmonary/Chest: Effort normal. He has decreased breath sounds. He has no wheezes.   Abdominal: Soft. He exhibits no distension.   PEG in place   Musculoskeletal: He exhibits no tenderness.   SCD   Neurological: He is alert and oriented to person, place, and time.   Skin: Skin is warm and dry. He is not diaphoretic.   Psychiatric: He has a normal mood and affect. His behavior is normal.   Nursing note and vitals reviewed.      Results Review:       I reviewed the patient's new clinical results.      Results from last 7 days   Lab Units 19  0656   WBC 10*3/mm3 11.77* 9.16 7.40 7.71   HEMOGLOBIN g/dL 12.2* 11.8* 11.7* 12.5*   PLATELETS 10*3/mm3 221 182 178 191     Results from last 7 days   Lab Units 190 19  0637   SODIUM mmol/L 139 138 140 143   POTASSIUM mmol/L 4.1 4.1 4.0 3.8   CHLORIDE mmol/L 98 100 101 103   CO2 mmol/L 29.6* 29.3* 26.2 28.7   BUN mg/dL 17 18 18 23   CREATININE mg/dL 0.65* 0.68* 0.79 0.88   GLUCOSE mg/dL 170* 161* 168* 273*   Estimated Creatinine Clearance: 93.5 mL/min (A) (by C-G  formula based on SCr of 0.65 mg/dL (L)).  Results from last 7 days   Lab Units 02/08/19  0624 02/07/19  0450 02/06/19  0456 02/05/19  0637 02/04/19  0656   CALCIUM mg/dL 9.2 8.7 8.2* 8.5* 8.8   ALBUMIN g/dL  --   --   --  2.90* 3.00*   MAGNESIUM mg/dL  --   --   --  1.8  --          aspirin 81 mg Oral Daily   carvedilol 3.125 mg Per G Tube Q12H   insulin glargine 10 Units Subcutaneous Nightly   insulin lispro 0-14 Units Subcutaneous 4x Daily With Meals & Nightly   MethylPREDNISolone 4 mg Oral Tonight   [START ON 2/9/2019] MethylPREDNISolone 4 mg Oral Before Breakfast       lactated ringers 9 mL/hr Last Rate: 9 mL/hr (02/02/19 0927)   Diet Full Liquid; Consistent Carbohydrate      Assessment/Plan      Active Hospital Problems    Diagnosis Date Noted   • **Acute respiratory failure with hypoxia and hypercapnia (CMS/[facility]) [J96.01, J96.02] 02/03/2019   • COPD (chronic obstructive pulmonary disease) (CMS/[facility]) [J44.9] 02/03/2019   • Hypertension [I10] 02/03/2019   • Type 2 diabetes mellitus (CMS/[facility]) [E11.9] 02/03/2019   • Vocal cord paralysis [J38.00] 02/03/2019   • Aspiration pneumonia of both lower lobes (healthcare associated) [J69.0] 02/03/2019   • Hypokalemia [E87.6] 02/03/2019   • Decubitus ulcer of coccyx, unspecified pressure ulcer stage [L89.159] 02/03/2019   • Laryngeal cancer (CMS/[facility]) [C32.9] 01/29/2019   • Oropharyngeal dysphagia [R13.12] 01/24/2019      Resolved Hospital Problems   No resolved problems to display.     - Laryngeal Cancer: On steroid taper with leukocytosis. Sp PEG. Ok to ADAT while also getting TF. Patient had declined tracheostomy. ENT and Surgery following. Dr Godoy, Oncology, to see in clinic.  - Aspiration PNA: Completed antibiotic course. Appreciate Pulmonology evaluation.  - Acute Hypoxemic/Hypercapnic Respiratory failure: Resolved.  - DM2: A1c 6.6 indicating good control on home regimen. Continue insulin therapy here. Elevation due to steroid and expect improvement with continued  taper.  - Urinary Retention: Voiding fine. He had condom cath, not saba.  - Weakness: Continue PT/OT.  - Disposition: HH/plan for discharge tomorrow. Will write for walker.    Reese Fairbanks MD  [facility]  02/08/19  2:48 PM

## 2019-02-08 NOTE — PROGRESS NOTES
Postoperative day #6 placement of PEG    Subjective:  No further bleeding overnight.  G-tube is working fine.    Objective:  Patient afebrile with stable vitals  Gen.: Awake alert and oriented, no acute distress  Abdomen: Soft and benign, G-tube site is clean with only a small amount of dried blood on the dressing.  No clots today.    Assessment and plan:  -Postop day #6 placement of PEG  -Mild bleeding around the G-tube seems to have stopped while off Lovenox.  Certainly fine to continue using the G-tube.  Would probably recommend waiting at least tomorrow before restarting Lovenox, if that is felt to be necessary.    Quinn Gómez MD  General and Endoscopic Surgery  [facility]    [address], [address]  [place], [place], [address]  P: [phone]  F: [phone]

## 2019-02-08 NOTE — PLAN OF CARE
Problem: Patient Care Overview  Goal: Plan of Care Review  Outcome: Ongoing (interventions implemented as appropriate)   02/08/19 0566   Coping/Psychosocial   Plan of Care Reviewed With patient   Plan of Care Review   Progress improving   OTHER   Outcome Summary Patient had no bleeding around PEG tube tonight. Will check hemoglobin this morning per MD order. Patient got feeding HS prior to bed. Patient walking well with walker to and from chair and bed. Plan is for patient to go to rehab vs. SNF today if a bed is available. Vital signs remain stable. Continue to monitor closely, continue with current plan of care       Problem: Fall Risk (Adult)  Goal: Absence of Fall  Outcome: Ongoing (interventions implemented as appropriate)      Problem: Skin Injury Risk (Adult)  Goal: Skin Health and Integrity  Outcome: Ongoing (interventions implemented as appropriate)      Problem: Anxiety (Adult)  Goal: Reduction/Resolution  Outcome: Ongoing (interventions implemented as appropriate)      Problem: Pain, Acute (Adult)  Goal: Acceptable Pain Control/Comfort Level  Outcome: Ongoing (interventions implemented as appropriate)      Problem: Dysphagia (Adult)  Goal: Functional/Safe Swallow  Outcome: Ongoing (interventions implemented as appropriate)    Goal: Compensatory Techniques to Improve Safety/Function with Swallowing  Outcome: Ongoing (interventions implemented as appropriate)      Problem: Nutrition, Enteral (Adult)  Goal: Signs and Symptoms of Listed Potential Problems Will be Absent, Minimized or Managed (Nutrition, Enteral)  Outcome: Ongoing (interventions implemented as appropriate)

## 2019-02-09 NOTE — NURSING NOTE
Through this shift, I tried multiple times (see flow sheet) to encourage pt to turn for skin health. Given education about skin break down. Pt insists he is not comfortable without pillows and being propped the specific way he wants to be propped in the bed. Up to chair before shift ended. Pt ambulates with minimal assist, A&O x4, will pleasantly refuse turns and making use of provided education about skin health.

## 2019-02-09 NOTE — DISCHARGE SUMMARY
Date of Admission: 1/29/2019  Date of Discharge:  2/9/2019  Primary Care Physician: Erinn Howard MD     Discharge Diagnosis:  Active Hospital Problems    Diagnosis Date Noted   • **Laryngeal cancer (CMS/HCC) [C32.9] 01/29/2019   • COPD (chronic obstructive pulmonary disease) (CMS/[facility]) [J44.9] 02/03/2019   • Hypertension [I10] 02/03/2019   • Type 2 diabetes mellitus (CMS/[facility]) [E11.9] 02/03/2019   • Vocal cord paralysis [J38.00] 02/03/2019   • Aspiration pneumonia of both lower lobes (healthcare associated) [J69.0] 02/03/2019   • Acute respiratory failure with hypoxia and hypercapnia (CMS/[facility]) [J96.01, J96.02] 02/03/2019   • Hypokalemia [E87.6] 02/03/2019   • Decubitus ulcer of coccyx, unspecified pressure ulcer stage [L89.159] 02/03/2019   • Oropharyngeal dysphagia [R13.12] 01/24/2019      Resolved Hospital Problems   No resolved problems to display.       Presenting Problem/History of Present Illness:  Laryngeal cancer (CMS/[facility]) [C32.9]  Acute respiratory failure with hypercapnia (CMS/[facility]) [J96.02]     Hospital Course:  The patient is a 79 y.o. male was initially admitted to otolaryngology for laryngeal cancer and underwent direct suspension microlaryngoscopy and biopsy, rigid cervical esophagoscopy on 1/29/19. See history and physical for additional details. Postop he required ICU stay for acute hypoxic and hypercapnic respiratory failure. Pulmonology, medical oncology, and radiation oncology were consulted. Did require intubation and was treated with Zosyn for aspiration pneumonia. Surgery was consulted and underwent PEG basement on 2/2/19. He was given steroids for check edema as well. He was successfully extubated and transferred to floor bed. We were consulted and then subsequently assumed care on 2/5/19. Tracheostomy was offered but he declined that. He has been weaned off of steroids with Medrol taper and has also completed his antibiotic course for pneumonia. He is currently maintaining respiratory  status and is not hypoxic. He is tolerating oral intake in addition to bolus tube feeds. He did have some hyperglycemia with steroids. He has a history of diabetes and is maintained on oral regimen at home. He was given supplemental insulin and A1c was checked which was 6.6. This indicates good control at home so he will be discharged on his home regimen. He has been off of his hypertensive regimen and blood pressure has remained stable to mildly elevated. We will resume home Coreg and lisinopril however we will stop Lasix and hydrochlorothiazide. These can be resumed as an outpatient as needed. He has planned follow-up with medical oncology in about 1 week to determine his further course of care. He can follow-up with surgery as needed and should also arrange follow-up with otolaryngology.    He worked with PT and OT and initially SNF was recommended. He has elected after some subsequent improvement in functional status to return home with home health. He will need to continue tube feeds via PEG as well as continue to work with PT OT and SLP.    Exam Today:  Constitutional: He is oriented to person, place, and time. He appears well-developed. No distress.   HENT:   Head: Atraumatic.   Nose: Nose normal.   Eyes: Conjunctivae and EOM are normal.   Neck: Normal range of motion. Neck supple.   Cardiovascular: Normal rate, regular rhythm and intact distal pulses.   Pulmonary/Chest: Effort normal. He has decreased breath sounds. He has no wheezes.   Abdominal: Soft. He exhibits no distension.   PEG in place   Musculoskeletal: He exhibits no tenderness.   SCD   Neurological: He is alert and oriented to person, place, and time.   Skin: Skin is warm and dry. He is not diaphoretic.   Psychiatric: He has a normal mood and affect. His behavior is normal.    Results:  Tissue Pathology Exam: [id number]     Component    Final Diagnosis   1.  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       2.  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             CT Neck Soft Tissue  1. No discrete mass involving the cord is appreciated. The aryepiglottic  folds are at the upper limits of normal in size. Clinical correlation  and direct visualization is suggested. There is no evidence of  pathologic adenopathy.  2. Superficial cutaneous lesion involving the chest wall anteriorly  medially just below the clavicle similar in appearance when compared to  the examination of 08/07/2018, nonspecific. Directed physical  examination is suggested.    Procedures Performed:  Procedure(s):  PERCUTANEOUS ENDOSCOPIC GASTROSTOMY TUBE INSERTION  ESOPHAGOGASTRODUODENOSCOPY       Consults:   Consults     Date and Time Order Name Status Description    2/3/2019 1450 Inpatient Internal Medicine Consult Completed     1/30/2019 1630 Inpatient General Surgery Consult Completed     1/29/2019 1229 Inpatient Pulmonology Consult Completed     1/29/2019 1125 Inpatient Radiation Oncology Consult      1/29/2019 1125 Inpatient Internal Medicine Consult Completed     1/29/2019 1125 Hematology & Oncology Inpatient Consult Completed            Discharge Disposition:  Home-Health Care [facility]    Discharge Medications:     Discharge Medications      Continue These Medications      Instructions Start Date   albuterol sulfate  (90 Base) MCG/ACT inhaler  Commonly known as:  PROVENTIL HFA;VENTOLIN HFA;PROAIR HFA   2 puffs, Inhalation, Every 4 Hours PRN      amoxicillin 500 MG capsule  Commonly known as:  AMOXIL   1,000 mg, Oral, 2 Times Daily, ALTERNATES WEEKS WITH CIPRO       ANORO ELLIPTA 62.5-25 MCG/INH aerosol powder  inhaler  Generic drug:  umeclidinium-vilanterol   1 puff, Inhalation, Daily - RT      aspirin 81 MG tablet   81 mg, Oral, Daily, HELD FOR OR      carvedilol 3.125 MG tablet  Commonly known as:  COREG   3.125 mg, Oral, 2  Times Daily With Meals      ciprofloxacin 500 MG tablet  Commonly known as:  CIPRO   500 mg, Oral, 2 Times Daily, ALTERNATES WEEKS WITH AMOXICILLIN      clotrimazole-betamethasone 1-0.05 % cream  Commonly known as:  LOTRISONE   Topical, 2 Times Daily      diphenoxylate-atropine 2.5-0.025 MG per tablet  Commonly known as:  LOMOTIL   1 tablet, Oral, 4 Times Daily PRN      econazole nitrate 1 % cream  Commonly known as:  SPECTAZOLE   Topical      fluticasone 50 MCG/ACT nasal spray  Commonly known as:  FLONASE   2 sprays, Nasal, 2 Times Daily      glimepiride 4 MG tablet  Commonly known as:  AMARYL   8 mg, Oral, Every Evening      glimepiride 4 MG tablet  Commonly known as:  AMARYL   4 mg, Oral, Every Morning Before Breakfast      ipratropium-albuterol 0.5-2.5 mg/3 ml nebulizer  Commonly known as:  DUO-NEB   3 mL, Nebulization, Every 4 Hours PRN      JANUVIA 50 MG tablet  Generic drug:  SITagliptin   50 mg, Oral, Daily, CURRENTLY NOT TAKING D/T COST      lidocaine-prilocaine 2.5-2.5 % cream  Commonly known as:  EMLA   Topical, As Needed      lisinopril 20 MG tablet  Commonly known as:  PRINIVIL,ZESTRIL   20 mg, Oral, Daily      metFORMIN 1000 MG tablet  Commonly known as:  GLUCOPHAGE   1,000 mg, Oral, 2 Times Daily      montelukast 10 MG tablet  Commonly known as:  SINGULAIR   10 mg, Oral, Nightly         Stop These Medications    furosemide 20 MG tablet  Commonly known as:  LASIX     hydrochlorothiazide 25 MG tablet  Commonly known as:  HYDRODIURIL     WELCHOL 625 MG tablet  Generic drug:  colesevelam            Discharge Diet:   Diet Instructions     Advance Diet As Tolerated      Diet: Tube Feeding; Bolus; Nutren 1.5 (Osmolite 1.5); Tube Feeding Type: Bolus; Bolus Feeding (mL): 120; Increase Bolus By (mL): 120; Every __ Hours: 4; Goal Bolus Amount (mL): 360; Final Goal is (mL): 360; Every: 4 Hrs starting at 02/07      Discharge Diet:  Tube Feeding    Feeding Type:  Bolus    Formula, Amount & Frequency:  Nutren 1.5  (Osmolite 1.5); Tube Feeding Type: Bolus; Bolus Feeding (mL): 120; Increase Bolus By (mL): 120; Every __ Hours: 4; Goal Bolus Amount (mL): 360; Final Goal is (mL): 360; Every: 4 Hrs starting at 02/07          Activity at Discharge:   Activity Instructions     Activity as Tolerated            Follow-up Appointments:  Additional Instructions for the Follow-ups that You Need to Schedule     Ambulatory Referral to Home Health   As directed      Face to Face Visit Date:  2/8/2019    Follow-up Provider for Plan of Care?:  I treated the patient in an acute care facility and will not continue treatment after discharge.    Follow-up Provider:  WILBER HOWARD [[id number]]    Reason/Clinical Findings:  weakness, laryngeal cancer, TF and PEG maintenance    Describe mobility limitations that make leaving home difficult:  see above    Nursing/Therapeutic Services Requested:  Skilled Nursing Physical Therapy Occupational Therapy Speech Therapy    Skilled nursing orders:  Wound care dressing/changes    PT orders:  Strengthening Therapeutic exercise    Occupational orders:  Energy conservation    SLP orders:  Dysphagia therapy    Frequency:  1 Week 1            Contact information for follow-up providers     [facility] REFERRAL [place] AND [place] Follow up.    Specialty:  Home Health Services  Contact information:  [address]  [facility] [address]           Wilber Howard MD Follow up.    Specialty:  Internal Medicine  Contact information:  [address] [facility] [address]  [facility] [address]  [phone]             Rima Godoy MD Follow up.    Specialties:  Hematology and Oncology, Oncology, Hematology  Contact information:  [address]  [facility] [address]  [phone]             Michael Bhagat MD Follow up.    Specialty:  Otolaryngology  Contact information:  [address]  [facility] [address]  [phone]             Twin Bobby MD  Follow up.    Specialty:  General Surgery  Why:  as needed  Contact information:  [address]    [facility] [address]  [phone]                   Contact information for after-discharge care     Durable Medical Equipment     [facility] .    Service:  Durable Medical Equipment  Contact information:  [address]  [facility] [address]  [phone]                 Home Medical Care     [facility] [place] Follow up.    Service:  Home Health Services  Contact information:  [address]  [facility] [address] [phone]                             Test Results Pending at Discharge:       Reese Fairbanks MD  02/09/19  1:01 PM    Time Spent on Discharge Activities: >30 minutes

## 2019-02-09 NOTE — PLAN OF CARE
Problem: Patient Care Overview  Goal: Plan of Care Review  Outcome: Ongoing (interventions implemented as appropriate)   02/09/19 0533   Coping/Psychosocial   Plan of Care Reviewed With patient   Plan of Care Review   Progress no change   OTHER   Outcome Summary Pt bolus night tube feed given and water flush. Pt denied nausea, denied abdominal pain/discomfort. Mepilex to coccyx and L-gluteal fold changed. Pt ambulating with minimal assist. VSS.

## 2019-02-10 NOTE — OUTREACH NOTE
Prep Survey      Responses   Facility patient discharged from?  [facility]   Is patient eligible?  Yes   Discharge diagnosis  Laryngeal cancer,    COPD,     Aspiration pneumonia of both lower lobes     Does the patient have one of the following disease processes/diagnoses(primary or secondary)?  COPD/Pneumonia   Does the patient have Home health ordered?  Yes   What is the Home health agency?   [facility]    Is there a DME ordered?  Yes   What DME was ordered?  [employer]    General alerts for this patient  continue tube feeds via PEG    Prep survey completed?  Yes          Malinda Duque RN

## 2019-02-11 NOTE — PROGRESS NOTES
Case Management Discharge Note    Final Note: Confirmed with [facility] following patient at home.    Destination      No service has been selected for the patient.      Durable Medical Equipment - Selection Complete      Service Provider Request Status Selected Services Address Phone Number Fax Number    [facility] Selected Durable Medical Equipment [address], [facility] [id number] [phone]      Dialysis/Infusion      No service has been selected for the patient.      Home Medical Care - Selection Complete      Service Provider Request Status Selected Services Address Phone Number Fax Number    [facility] [place] Selected Home Health Services [address] [place] [address] [address] [phone]      Community Resources      No service has been selected for the patient.             Final Discharge Disposition Code: 06 - home with home health care

## 2019-02-12 PROBLEM — I26.99 PULMONARY EMBOLISM ON RIGHT (HCC): Status: ACTIVE | Noted: 2019-01-01

## 2019-02-12 NOTE — ED PROVIDER NOTES
EMERGENCY DEPARTMENT ENCOUNTER    CHIEF COMPLAINT  Chief Complaint: SOA  History given by: Pt, son  History limited by: None  Room Number: 41/41  PMD: Erinn Howard MD      HPI:  Pt is a 79 y.o. male who presents complaining of worsening SOA since his discharge 3 days ago. Pt also c/o BLE edema and cough. He states he coughs when swallowing. He is on Cipro for his BLE cellulitis. He denies leg pain, difficulty swallowing, fever, and vomiting. Pt was admitted 1/29/19-2/9/19 for acute respiratory failure. Pt has a h/o Type II BM, HTN, COPD, and hyperlipidemia. Pt is not on O2 at home.     Duration:  4 days  Onset: gradual  Timing: constant  Location: respiratory  Quality: short of breath  Intensity/Severity: moderate  Progression: worsening   Associated Symptoms: BLE edema, cough  Previous Episodes: Pt was admitted 1/29/19-2/9/19 for acute respiratory failure.  Treatment before arrival: Pt is on Cipro for BLE cellulitis.     PAST MEDICAL HISTORY  Active Ambulatory Problems     Diagnosis Date Noted   • Diabetes mellitus type 2, uncontrolled (CMS/[facility]) 01/21/2016   • Hypogonadism in male 01/21/2016   • BP (high blood pressure) 01/21/2016   • HLD (hyperlipidemia) 01/21/2016   • Abnormal weight gain 01/21/2016   • Diabetes mellitus type 2, uncontrolled, with complications (CMS/[facility]) 01/21/2016   • Type 2 diabetes mellitus with diabetic nephropathy (CMS/[facility]) 01/21/2016   • Laryngeal cancer (CMS/[facility]) 01/29/2019   • Acute respiratory failure with hypercapnia (CMS/[facility]) 01/30/2019   • Oropharyngeal dysphagia 01/24/2019   • COPD (chronic obstructive pulmonary disease) (CMS/[facility]) 02/03/2019   • Hypertension 02/03/2019   • Type 2 diabetes mellitus (CMS/[facility]) 02/03/2019   • Vocal cord paralysis 02/03/2019   • Aspiration pneumonia of both lower lobes (healthcare associated) 02/03/2019   • Acute respiratory failure with hypoxia and hypercapnia (CMS/[facility]) 02/03/2019   • Hypokalemia 02/03/2019   • Decubitus ulcer of coccyx, unspecified  pressure ulcer stage 02/03/2019     Resolved Ambulatory Problems     Diagnosis Date Noted   • No Resolved Ambulatory Problems     Past Medical History:   Diagnosis Date   • Abdominal cramping    • Anxiety    • Cellulitis    • Chronic cough    • COPD (chronic obstructive pulmonary disease) (CMS/HCC)    • Dysphasia    • Edema, lower extremity    • Enlarged heart    • History of laryngeal cancer    • Hyperlipidemia    • Hypertension    • Hypogonadism male    • Type 2 diabetes mellitus (CMS/[facility])    • Vocal cord paralysis        PAST SURGICAL HISTORY  Past Surgical History:   Procedure Laterality Date   • CHOLECYSTECTOMY     • ENDOSCOPY N/A 2/2/2019    Procedure: ESOPHAGOGASTRODUODENOSCOPY;  Surgeon: Twin Bobby MD;  Location: [facility];  Service: Gastroenterology   • LARYNGOSCOPY N/A 1/29/2019    Procedure: DIRECT SUSPENSION MICROLARYNGOSCOPY BIOPSY AND POSSIBLE CERVICE ESOPHAGOSCOPY;  Surgeon: Michael Bhagat MD;  Location: [facility];  Service: ENT   • PEG TUBE INSERTION N/A 2/2/2019    Procedure: PERCUTANEOUS ENDOSCOPIC GASTROSTOMY TUBE INSERTION;  Surgeon: Twin Bobby MD;  Location: [facility];  Service: Gastroenterology   • PROSTATECTOMY  2004   • TOTAL LARYNGECTOMY  2005       FAMILY HISTORY  Family History   Problem Relation Age of Onset   • Cancer Other    • Heart disease Other    • Malig Hyperthermia Neg Hx        SOCIAL HISTORY  Social History     Socioeconomic History   • Marital status: Single     Spouse name: Not on file   • Number of children: Not on file   • Years of education: Not on file   • Highest education level: Not on file   Social Needs   • Financial resource strain: Not on file   • Food insecurity - worry: Not on file   • Food insecurity - inability: Not on file   • Transportation needs - medical: Not on file   • Transportation needs - non-medical: Not on file   Occupational History   • Not on file   Tobacco Use   • Smoking status: Former Smoker      Packs/day: 1.50     Years: 50.00     Pack years: 75.00     Types: Cigarettes     Last attempt to quit: 3/8/2004     Years since quittin.9   • Smokeless tobacco: Never Used   Substance and Sexual Activity   • Alcohol use: Yes     Comment: SOCIAL   • Drug use: No   • Sexual activity: Defer   Other Topics Concern   • Not on file   Social History Narrative   • Not on file       ALLERGIES  Patient has no known allergies.    REVIEW OF SYSTEMS  Review of Systems   Constitutional: Negative for activity change, appetite change and fever.   HENT: Negative for congestion, sore throat and trouble swallowing.    Eyes: Negative.    Respiratory: Positive for shortness of breath. Negative for cough.    Cardiovascular: Positive for leg swelling (BLE). Negative for chest pain.   Gastrointestinal: Negative for abdominal pain, diarrhea and vomiting.   Endocrine: Negative.    Genitourinary: Negative for decreased urine volume and dysuria.   Musculoskeletal: Negative for myalgias and neck pain.   Skin: Negative for rash and wound.   Allergic/Immunologic: Negative.    Neurological: Negative for weakness, numbness and headaches.   Hematological: Negative.    Psychiatric/Behavioral: Negative.    All other systems reviewed and are negative.      PHYSICAL EXAM  ED Triage Vitals [19 1641]   Temp Heart Rate Resp BP SpO2   -- 77 -- 116/58 98 %      Temp src Heart Rate Source Patient Position BP Location FiO2 (%)   -- -- -- -- --       Physical Exam   Constitutional: He is oriented to person, place, and time and well-developed, well-nourished, and in no distress. No distress.   HENT:   Mouth/Throat: Mucous membranes are normal.   Eyes: EOM are normal.   Cardiovascular: Normal rate and regular rhythm. Exam reveals no gallop and no friction rub.   No murmur heard.  Pulmonary/Chest: Effort normal. Tachypnea (mild) noted. No respiratory distress. He has no wheezes. He has rhonchi (bilaterally with referred upper airway sounds). He has no  rales.   Breath sounds are symmetric.   Abdominal: Soft. He exhibits no distension. There is no tenderness. There is no guarding.   Musculoskeletal: Normal range of motion. He exhibits edema (BLE 1+ pitting edema with erythema ). He exhibits no deformity.   Neurological: He is alert and oriented to person, place, and time.   moving all extremities, no focal deficits   Skin: Skin is warm and dry.   Psychiatric:   Calm, cooperative       LAB RESULTS  Lab Results (last 24 hours)     Procedure Component Value Units Date/Time    CBC & Differential [[id number]] Collected:  02/12/19 1824    Specimen:  Blood Updated:  02/12/19 1840    Narrative:       The following orders were created for panel order CBC & Differential.  Procedure                               Abnormality         Status                     ---------                               -----------         ------                     CBC Auto Differential[[id number]]        Abnormal            Final result                 Please view results for these tests on the individual orders.    Comprehensive Metabolic Panel [[id number]]  (Abnormal) Collected:  02/12/19 1824    Specimen:  Blood Updated:  02/12/19 1902     Glucose 102 mg/dL      BUN 31 mg/dL      Creatinine 0.76 mg/dL      Sodium 140 mmol/L      Potassium 4.6 mmol/L      Chloride 98 mmol/L      CO2 32.8 mmol/L      Calcium 9.2 mg/dL      Total Protein 6.6 g/dL      Albumin 3.30 g/dL      ALT (SGPT) 25 U/L      AST (SGOT) 12 U/L      Alkaline Phosphatase 40 U/L      Total Bilirubin 0.4 mg/dL      eGFR Non African Amer 99 mL/min/1.73      Globulin 3.3 gm/dL      A/G Ratio 1.0 g/dL      BUN/Creatinine Ratio 40.8     Anion Gap 9.2 mmol/L     Narrative:       The MDRD GFR formula is only valid for adults with stable renal function between ages 18 and 70.    Magnesium [[id number]]  (Normal) Collected:  02/12/19 1824    Specimen:  Blood Updated:  02/12/19 1902     Magnesium 1.8 mg/dL     Troponin [[id number]]  (Normal)  Collected:  02/12/19 1824    Specimen:  Blood Updated:  02/12/19 1902     Troponin T <0.010 ng/mL     Narrative:       Troponin T Reference Range:  <= 0.03 ng/mL-   Negative for AMI  >0.03 ng/mL-     Abnormal for myocardial necrosis.  Clinicians would have to utilize clinical acumen, EKG, Troponin and serial changes to determine if it is an Acute Myocardial Infarction or myocardial injury due to an underlying chronic condition.     BNP [[id number]]  (Normal) Collected:  02/12/19 1824    Specimen:  Blood Updated:  02/12/19 1900     proBNP 1,665.0 pg/mL     Narrative:       Among patients with dyspnea, NT-proBNP is highly sensitive for the detection of acute congestive heart failure. In addition NT-proBNP of <300 pg/ml effectively rules out acute congestive heart failure with 99% negative predictive value.    CBC Auto Differential [[id number]]  (Abnormal) Collected:  02/12/19 1824    Specimen:  Blood Updated:  02/12/19 1840     WBC 9.18 10*3/mm3      RBC 4.31 10*6/mm3      Hemoglobin 12.2 g/dL      Hematocrit 40.3 %      MCV 93.5 fL      MCH 28.3 pg      MCHC 30.3 g/dL      RDW 14.6 %      RDW-SD 50.4 fl      MPV 11.6 fL      Platelets 239 10*3/mm3      Neutrophil % 76.5 %      Lymphocyte % 13.0 %      Monocyte % 8.6 %      Eosinophil % 1.2 %      Basophil % 0.2 %      Immature Grans % 0.5 %      Neutrophils, Absolute 7.02 10*3/mm3      Lymphocytes, Absolute 1.19 10*3/mm3      Monocytes, Absolute 0.79 10*3/mm3      Eosinophils, Absolute 0.11 10*3/mm3      Basophils, Absolute 0.02 10*3/mm3      Immature Grans, Absolute 0.05 10*3/mm3      nRBC 0.0 /100 WBC     Urinalysis With Microscopic If Indicated (No Culture) - Urine, Catheter [[id number]]  (Normal) Collected:  02/12/19 1924    Specimen:  Urine, Catheter Updated:  02/12/19 1931     Color, UA Yellow     Appearance, UA Clear     pH, UA <=5.0     Specific Gravity, UA 1.020     Glucose, UA Negative     Ketones, UA Negative     Bilirubin, UA Negative     Blood, UA Negative      Protein, UA Negative     Leuk Esterase, UA Negative     Nitrite, UA Negative     Urobilinogen, UA 0.2 E.U./dL    Narrative:       Urine microscopic not indicated.          I ordered the above labs and reviewed the results    RADIOLOGY  CT Angiogram Chest With Contrast   Final Result       1. Study is positive for acute pulmonary thromboembolus and pulmonary   arterial branches to the right upper lobe and right lower lobe. While   this is nonocclusive thrombus, the patient does have an abnormal RV LV   ratio, which may reflect right heart strain.   2. Areas of consolidation noted at the lung bases bilaterally. Similar   findings were present on prior exam from July 2018. These may reflect   some fibrotic changes, but the possibility of superimposed infiltrate is   not excluded. Patient is also noted to have some enlarged hilar nodes.   These may be reactive, but attention to them on short-term follow-up   suggested. Furthermore, the patient has a left lower lobe pulmonary   nodule, which is increased in size compared to prior exam from July 2018. Metastatic disease cannot be excluded, and correlation with PET is   recommended on a nonemergent outpatient basis.       Findings were called to Dr. Israel in the emergency room at 9:18 PM.       Radiation dose reduction techniques were utilized, including automated   exposure control and exposure modulation based on body size.       This report was finalized on 2/12/2019 9:20 PM by Dr. Nadia Baez M.D.               I ordered the above noted radiological studies. Interpreted by radiologist. Discussed with radiologist (Dr. Baez). Reviewed by me in PACS.       PROCEDURES  Procedures  EKG          EKG time: 7:05 PM  Rhythm/Rate: NSR 75  P waves and ND: normal  QRS, axis: RBBB   ST and T waves: no acute changes, no STEMI  Artifact in anterior leads    Interpreted Contemporaneously by me, independently viewed  unchanged compared to prior Jan 2019    PROGRESS  AND CONSULTS   6:31 PM  Labs, EKG, and CTA chest ordered for evaluation.     9:18 PM  Consult placed to [facility].     9:19 PM  Dr. Baez, radiology, reports CTA chest shows upper and lower right PEs. Informed pt of plan to admit. Pt understands and agrees with the plan, all questions answered.     9:36 PM  Lovenox ordered for anticoagulation.     9:46 PM  Discussed pt case with Dr. Ceballos [facility] who agrees to admit.     MEDICAL DECISION MAKING  Results were reviewed/discussed with the patient and they were also made aware of online access. Pt also made aware that some labs, such as cultures, will not be resulted during ER visit and follow up with PMD is necessary.     MDM  Number of Diagnoses or Management Options  Dyspnea, unspecified type:   History of treatment for malignancy:   Hypoxia:   Pulmonary embolism on right (CMS/HCC):   Diagnosis management comments: Initial concern for possible aspiration pneumonia based on reports from his son that he coughs with every liquid feed. However, CTA Chest shows RUL and RLL pulmonary emboli, with evidence of possible right heart strain. These were treated with 1mg/kg SC Lovenox. Discussed with Dr. Ceballos with [facility] for hospitalization. Patient and son updated on the course and plan and need for hospital stay.        Amount and/or Complexity of Data Reviewed  Clinical lab tests: ordered and reviewed (Troponin is <0.010  BNP-1,665)  Tests in the radiology section of CPT®: ordered and reviewed (CTA chest-right upper and lower lobe PEs)  Tests in the medicine section of CPT®: ordered and reviewed (See ekg note)  Discussion of test results with the performing providers: yes (Dr. Baez )  Decide to obtain previous medical records or to obtain history from someone other than the patient: yes  Review and summarize past medical records: yes  Discuss the patient with other providers: yes (Dr. Ceballos [facility])           DIAGNOSIS  Final diagnoses:   Pulmonary embolism on right  (CMS/HCC)   Hypoxia   Dyspnea, unspecified type   History of treatment for malignancy       DISPOSITION  ADMISSION    Discussed treatment plan and reason for admission with pt/family and admitting physician.  Pt/family voiced understanding of the plan for admission for further testing/treatment as needed.       Latest Documented Vital Signs:  As of 10:07 PM  BP- 121/73 HR- 82 Temp- 98.2 °F (36.8 °C) (Oral) O2 sat- 93%    --  Documentation assistance provided by casey Solano for Dr. Israel.  Information recorded by the casey was done at my direction and has been verified and validated by me.         Barbra Solano  02/12/19 [id number]       Joel Israel MD  02/12/19 9796

## 2019-02-12 NOTE — OUTREACH NOTE
COPD/PN Week 1 Survey      Responses   Facility patient discharged from?  [place]   Does the patient have one of the following disease processes/diagnoses(primary or secondary)?  COPD/Pneumonia   Is there a successful TCM telephone encounter documented?  No   Was the primary reason for admission:  COPD exacerbation [Pneumonia as well. ]   Week 1 attempt successful?  Yes   Call start time  1128   Call end time  1137   General alerts for this patient  continue tube feeds via PEG    Discharge diagnosis  Laryngeal cancer,    COPD,     Aspiration pneumonia of both lower lobes     Meds reviewed with patient/caregiver?  Yes   Is the patient having any side effects they believe may be caused by any medication additions or changes?  No   Does the patient have all medications ordered at discharge?  Yes   Is the patient taking all medications as directed (includes completed medication regime)?  Yes   Does the patient have a primary care provider?   Yes   Does the patient have an appointment with their PCP or pulmonologist within 7 days of discharge?  No   Comments regarding PCP  Dr Howard/ PCP- encouraged patient to make followup with PCP   What is preventing the patient from scheduling follow up appointments within 7 days of discharge?  Haven't had time   Nursing Interventions  Educated patient on importance of making appointment, Advised patient to make appointment   Has the patient kept scheduled appointments due by today?  N/A   What is the Home health agency?   [facility]    Has home health visited the patient within 72 hours of discharge?  Yes   What DME was ordered?  [employer]    Psychosocial issues?  No   Comments  Patient reports he is weak and frustrated he cannot go up and down his stairs in his house yet. He admits to being inpatient. he is using his walker. Has a caregiver as well.    Did the patient receive a copy of their discharge instructions?  Yes   Nursing interventions  Reviewed instructions with patient  "  What is the patient's perception of their health status since discharge?  Improving   Nursing Interventions  Nurse provided patient education   Is the patient/caregiver able to teach back the hierarchy of who to call/visit for symptoms/problems? PCP, Specialist, Home health nurse, Urgent Care, ED, 911  Yes   Is the patient able to teach back COPD zones?  Yes   Nursing interventions  Education provided on various zones   Patient reports what zone on this call?  Yellow Zone   Yellow Zone  Increased shortness of air, Unable to complete daily activities   Yellow interventions  Continue to use daily medications, Use quick relief inhaler as ordered, Call provider immediatly if symptoms do not improve   Is the patient/caregiver able to teach back signs and symptoms of worsening condition:  Fever/chills, Shortness of breath, Chest pain   Is the patient/caregiver able to teach back importance of completing antibiotic course of treatment?  Yes   Week 1 call completed?  Yes   Revoked  No further contact(revokes)-requires comment   Graduated/Revoked comments  Patient declines further calls, states \"I will call my doctor if I have a question.\"          Laron Hand RN  "

## 2019-02-12 NOTE — ED NOTES
"Per family, pt o2 saturations have been decreasing over the past few days that he has been at home after admission and d/c on Saturday. Pt also c/o increased swelling of lower extremities and was taken off of lasix during recent admission. Pt's family also c/o \"grunting\" respirations. Reassurance given; call light in reach. Family at bedside. MD Israel at bedside for eval.        Santa Samuel RN  02/12/19 1824    "

## 2019-02-13 PROBLEM — R13.10 DYSPHAGIA: Status: ACTIVE | Noted: 2019-01-01

## 2019-02-13 PROBLEM — J44.9 COPD (CHRONIC OBSTRUCTIVE PULMONARY DISEASE) (HCC): Status: ACTIVE | Noted: 2019-01-01

## 2019-02-13 NOTE — THERAPY EVALUATION
Acute Care - Speech Language Pathology   Swallow Initial Evaluation [facility]     Patient Name: Rosalio Rene  : 1940  MRN: [id number]  Today's Date: 2019               Admit Date: 2019    Visit Dx:     ICD-10-CM ICD-9-CM   1. Pulmonary embolism on right (CMS/HCC) I26.99 415.19   2. Hypoxia R09.02 799.02   3. Dyspnea, unspecified type R06.00 786.09   4. 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)   • Laryngeal cancer (CMS/HCC)   • Acute respiratory failure with hypercapnia (CMS/HCC)   • Dysphagia   • COPD (chronic obstructive pulmonary disease) (CMS/HCC)   • Hypertension   • Type 2 diabetes mellitus (CMS/HCC)   • Vocal cord paralysis   • Aspiration pneumonia of both lower lobes (healthcare associated)   • Acute respiratory failure with hypoxia and hypercapnia (CMS/HCC)   • Hypokalemia   • Decubitus ulcer of coccyx, unspecified pressure ulcer stage   • Pulmonary embolism on right (CMS/HCC)     Past Medical History:   Diagnosis Date   • Abdominal cramping     AT TIMES   • Anxiety    • Cellulitis     BILATERAL LOWER EXTREMITIES   • Chronic cough    • COPD (chronic obstructive pulmonary disease) (CMS/HCC)    • Dysphasia    • Edema, lower extremity    • Enlarged heart     PER PATIENT?? STATES SAW CARDIOLOGIST YRS AGO BUT DOES NOT REMEMBER NAME    • History of laryngeal cancer     HISTORY OF LARYNGECTOMY, RADIATION   • Hyperlipidemia    • Hypertension    • Hypogonadism male    • Type 2 diabetes mellitus (CMS/HCC)    • Vocal cord paralysis      Past Surgical History:   Procedure Laterality Date   • CHOLECYSTECTOMY     • ENDOSCOPY N/A 2019    Procedure:  "ESOPHAGOGASTRODUODENOSCOPY;  Surgeon: Twin Bobby MD;  Location: [facility] OR;  Service: Gastroenterology   • LARYNGOSCOPY N/A 1/29/2019    Procedure: DIRECT SUSPENSION MICROLARYNGOSCOPY BIOPSY AND POSSIBLE CERVICE ESOPHAGOSCOPY;  Surgeon: Michael Bhagat MD;  Location: [facility] MAIN OR;  Service: ENT   • PEG TUBE INSERTION N/A 2/2/2019    Procedure: PERCUTANEOUS ENDOSCOPIC GASTROSTOMY TUBE INSERTION;  Surgeon: Twin Bobby MD;  Location: [facility] OR;  Service: Gastroenterology   • PROSTATECTOMY  2004   • TOTAL LARYNGECTOMY  2005        SWALLOW EVALUATION (last 72 hours)      SLP Adult Swallow Evaluation     Row Name 02/13/19 0830          Document Type  evaluation  -SH    Subjective Information  no complaints  -SH    Patient Observations  alert;cooperative  -    Patient Effort  excellent  -          Patient Profile Reviewed  yes  -SH    Pertinent History Of Current Problem  R pulm emoblism. Recent D/C from [facility] with asp pna. SLP was consulted, but patient later intubated and order was D/Davi. Pt reports being cleared to eat with supplemental tube feedings at D/C that admit. ENT assessment 1/29: \"FINDINGS:Both vocal cords appeared normal but paralyzed in the paramedian position.  He had a diffuse necrotic ulcerative foul smelling lesion involving the posterior surface of the arytenoids, post cricoid and medial aspect of the pyriform sinuses.  The cervical esophagus was normal.  Frozen section is positive for invasive squamous cell carcinoma.\". Prior hx of XRT for laryngeal CA. Plan to chemo only at D/C per patient. -SH    Current Method of Nutrition  pureed;runny pureed;thin liquids;gastrostomy feedings  -    Precautions/Limitations, Hearing  WFL;for purposes of eval  -    Prior Level of Function-Communication  other (see comments) dysphonia, voice disorder  -    Prior Level of Function-Swallowing  other (see comments) coughing with PO  -SH    Plans/Goals Discussed with  " patient;agreed upon  -    Barriers to Rehab  medically complex  -    Patient's Goals for Discharge  patient did not state  -          Additional Documentation  Pain Scale: Numbers Pre/Post-Treatment (Group)  -          Pain Scale: Numbers, Pretreatment  0/10 - no pain  -    Pain Scale: Numbers, Post-Treatment  0/10 - no pain  -          Dentition Assessment  natural, present and adequate  -    Secretion Management  problems swallowing secretions  -    Mucosal Quality  moist, healthy  -    Volitional Swallow  delayed;weak  -    Volitional Cough  weak  -          Vocal Impairment, Detail. Cranial Nerve X (Vagus)  impaired throat clear/cough (see comments);vocal quality abnormality (see comments)  -          Clinical Swallow Evaluation Summary  Bedside swallow completed. Of note, pt exhibited nirali aspiration on esophagram in December with cough. Pt admits to coughing with PO at home. OME revealed breathy voice with harsh quality. Cough fair, suspected false vocal cord recruitment. Intermittent wet voice with ice, difficult to assess 2/2 dysphonia. Hyolaryngeal elevation slightly reduced upon palpation. SLP recs NPO w/ tube feedings and to allow ice chips sparingly after oral care. . An instrumental assessment was also recommended following assessment. SLP provided education to patient on two choices for instrumental assessment (FEES, MBSS). Pt is to decide if he wishes to proceed with SLP's recs to pursue further assessment of swallow function. Pt aware of the aspiration risks (death, asp pna, prolonged hospitalization) and teach back provided. Pt to discuss with family. RN notified.   -          SLP Swallowing Diagnosis  suspected pharyngeal dysfunction  -    Functional Impact  risk of aspiration/pneumonia  -    Rehab Potential/Prognosis, Swallowing  re-evaluate goals as necessary  -    Swallow Criteria for Skilled Therapeutic Interventions Met  demonstrates skilled criteria  -           Therapy Frequency (Swallow)  PRN  -    Predicted Duration Therapy Intervention (Days)  until discharge  -    SLP Diet Recommendation  ice chips between meals after oral care, with supervision  -    Recommended Diagnostics  VFSS (MBS);FEES  -    SLP Rec. for Method of Medication Administration  meds via alternate route  -    Monitor for Signs of Aspiration  yes;notify SLP if any concerns  -    Anticipated Dischage Disposition  anticipate therapy at next level of care  [facility]          Oral Nutrition/Hydration Goal Selection (SLP)  oral nutrition/hydration, SLP goal 1  -          Oral Nutrition/Hydration Goal 1, SLP  Pt will tolerate free water protocol with ice chips.  -    Time Frame (Oral Nutrition/Hydration Goal 1, SLP)  by discharge  -      User Key  (r) = Recorded By, (t) = Taken By, (c) = Cosigned By    Initials Name Effective Dates     Linda Low MS CCC-SLP 03/07/18 -           EDUCATION  The patient has been educated in the following areas:   Dysphagia (Swallowing Impairment).    SLP Recommendation and Plan  SLP Swallowing Diagnosis: suspected pharyngeal dysfunction  SLP Diet Recommendation: ice chips between meals after oral care, with supervision        Monitor for Signs of Aspiration: yes, notify SLP if any concerns  Recommended Diagnostics: VFSS (MBS), FEES  Swallow Criteria for Skilled Therapeutic Interventions Met: demonstrates skilled criteria  Anticipated Dischage Disposition: anticipate therapy at next level of care  Rehab Potential/Prognosis, Swallowing: re-evaluate goals as necessary  Therapy Frequency (Swallow): PRN  Predicted Duration Therapy Intervention (Days): until discharge       Plan of Care Reviewed With: patient  Plan of Care Review  Plan of Care Reviewed With: patient  Outcome Summary: Bedside swallow completed. SLP recs NPO w/ tube feedings and to allow ice chips sparingly after oral care. SLP provided education to patient on two choices for instrumental  assessment (FEES, MBSS). Pt is to decide if he wishes to proceed with SLP's recs or to pursue further assessment of swallow function. Pt aware of the aspiration risks (death, asp pna, prolonged hospitalization) and teach back provided.     SLP GOALS     Row Name 02/13/19 0830             Oral Nutrition/Hydration Goal 1 (SLP)    Oral Nutrition/Hydration Goal 1, SLP  Pt will tolerate free water protocol with ice chips.  -      Time Frame (Oral Nutrition/Hydration Goal 1, SLP)  by discharge  -        User Key  (r) = Recorded By, (t) = Taken By, (c) = Cosigned By    Initials Name Provider Type     Linda Low MS CCC-SLP Speech and Language Pathologist           SLP Outcome Measures (last 72 hours)      SLP Outcome Measures     Row Name 02/13/19 0900             SLP Outcome Measures    Outcome Measure Used?  Adult NOMS  -         Adult FCM Scores    FCM Chosen  Swallowing  -      Swallowing FCM Score  1  -        User Key  (r) = Recorded By, (t) = Taken By, (c) = Cosigned By    Initials Name Effective Dates     Linda Low MS CCC-SLP 03/07/18 -            Time Calculation:   Time Calculation- SLP     Row Name 02/13/19 0941             Time Calculation- [facility]    SLP Start Time  0830  -      SLP Received On  02/13/19  -        User Key  (r) = Recorded By, (t) = Taken By, (c) = Cosigned By    Initials Name Provider Type     Linda Low MS CCC-SLP Speech and Language Pathologist          Therapy Charges for Today     Code Description Service Date Service Provider Modifiers Qty    08262697855 HC ST EVAL ORAL PHARYNG SWALLOW 4 2/13/2019 Linda Low MS CCC-SLP GN 1               Linda Low MS CCC-DANILO  2/13/2019

## 2019-02-13 NOTE — PROGRESS NOTES
BLEV doppler completed. Prelim is pos for acute rt calf vein thrombus without extension into popliteal vein called to [facility].

## 2019-02-13 NOTE — PLAN OF CARE
Problem: Patient Care Overview  Goal: Plan of Care Review  Outcome: Ongoing (interventions implemented as appropriate)   02/13/19 0627   Coping/Psychosocial   Plan of Care Reviewed With patient   Plan of Care Review   Progress improving   OTHER   Outcome Summary Pt admited from ER with PE, 4L NC. Q2 turn, pressure injuries present. See pictures in chart. Pulmonary, cardiology, and ENT consult. NPO. PEG tube in place, order for nutrition to see. Speech to see. vss. will continue to monitor.        Problem: VTE, DVT and PE (Adult)  Goal: Signs and Symptoms of Listed Potential Problems Will be Absent, Minimized or Managed (VTE, DVT and PE)  Outcome: Ongoing (interventions implemented as appropriate)   02/13/19 0627   Goal/Outcome Evaluation   Problems Assessed (VTE, DVT, PE) all   Problems Present (VTE, DVT, PE) pulmonary embolism       Problem: Skin Injury Risk (Adult)  Goal: Identify Related Risk Factors and Signs and Symptoms  Outcome: Outcome(s) achieved Date Met: 02/13/19 02/13/19 0627   Skin Injury Risk (Adult)   Related Risk Factors (Skin Injury Risk) advanced age;edema;fluid intake inadequate;mobility impaired;tissue perfusion altered     Goal: Skin Health and Integrity   02/13/19 0627   Skin Injury Risk (Adult)   Skin Health and Integrity making progress toward outcome

## 2019-02-13 NOTE — PROGRESS NOTES
Name: Rosalio Rene ADMIT: 2019   : 1940  PCP: Erinn Howard MD    MRN: [id number] LOS: 1 days   AGE/SEX: 79 y.o. male  ROOM: [facility]   Subjective     Shortness of breath continues today.  On 4 L nasal cannula, no oxygen at baseline  No chest pain or palpitations  Has been cleared restart his tube feeding  No nausea or vomiting  Duplex today shows right calf DVT  Objective   Vital Signs  Temp:  [97.6 °F (36.4 °C)-98.5 °F (36.9 °C)] 98.5 °F (36.9 °C)  Heart Rate:  [65-90] 90  Resp:  [18-24] 18  BP: (116-140)/(58-73) 140/67  SpO2:  [88 %-98 %] 97 %  on  Flow (L/min):  [2-4] 3;   Device (Oxygen Therapy): nasal cannula  Body mass index is 26.08 kg/m².    Physical Exam   Constitutional: He is oriented to person, place, and time. No distress.   HENT:   Head: Normocephalic and atraumatic.   Eyes: EOM are normal. Pupils are equal, round, and reactive to light. No scleral icterus.   Neck: No JVD present.   Cardiovascular: Normal rate and regular rhythm.   No murmur heard.  Pulmonary/Chest: Effort normal and breath sounds normal. No respiratory distress. He has no wheezes. He has no rales.   Abdominal: Soft. Bowel sounds are normal. He exhibits no distension. There is no tenderness. There is no guarding.   Left-sided PEG tube without erythema or warmth   Musculoskeletal: He exhibits edema (1+ bilaterally, chronic venous stasis changes).   Neurological: He is alert and oriented to person, place, and time. No cranial nerve deficit.   Very soft spoken   Skin: Skin is warm and dry. No rash noted. He is not diaphoretic. No erythema.   Psychiatric: His behavior is normal.       Results Review:       I reviewed the patient's new clinical results.  Results from last 7 days   Lab Units 19  1824 19  0624   WBC 10*3/mm3 9.18 11.77*   HEMOGLOBIN g/dL 12.2* 12.2*   PLATELETS 10*3/mm3 239 221     Results from last 7 days   Lab Units 19  1824 19  0624 19  0450   SODIUM mmol/L 140 139 138    POTASSIUM mmol/L 4.6 4.1 4.1   CHLORIDE mmol/L 98 98 100   CO2 mmol/L 32.8* 29.6* 29.3*   BUN mg/dL 31* 17 18   CREATININE mg/dL 0.76 0.65* 0.68*   GLUCOSE mg/dL 102* 170* 161*   Estimated Creatinine Clearance: 89.8 mL/min (by C-G formula based on SCr of 0.76 mg/dL).  Results from last 7 days   Lab Units 02/12/19  1824   ALBUMIN g/dL 3.30*   BILIRUBIN mg/dL 0.4   ALK PHOS U/L 40   AST (SGOT) U/L 12   ALT (SGPT) U/L 25     Results from last 7 days   Lab Units 02/12/19  1824 02/08/19  0624 02/07/19  0450   CALCIUM mg/dL 9.2 9.2 8.7   ALBUMIN g/dL 3.30*  --   --    MAGNESIUM mg/dL 1.8  --   --            carvedilol 3.125 mg Oral BID With Meals   castor oil-balsam peru  Topical Q12H   enoxaparin 1 mg/kg Subcutaneous Q12H   insulin lispro 0-9 Units Subcutaneous 4x Daily With Meals & Nightly   lisinopril 20 mg Oral Daily   montelukast 10 mg Oral Nightly   sodium chloride 3 mL Intravenous Q12H      NPO Diet      Assessment/Plan      Active Hospital Problems    Diagnosis Date Noted   • Pulmonary embolism on right (CMS/[facility]) [I26.99] 02/12/2019   • COPD (chronic obstructive pulmonary disease) (CMS/[facility]) [J44.9] 02/03/2019   • Vocal cord paralysis [J38.00] 02/03/2019   • Decubitus ulcer of coccyx, unspecified pressure ulcer stage [L89.159] 02/03/2019   • Laryngeal cancer (CMS/[facility]) [C32.9] 01/29/2019   • Dysphagia [R13.10] 01/24/2019   • Diabetes mellitus type 2, uncontrolled (CMS/[facility]) [E11.65] 01/21/2016      Resolved Hospital Problems   No resolved problems to display.     Pulmonary embolism on right    -echo results pending  -cardiology has seen, no right heart strain  -lovenox  -right calf DVT on duplex  -continue lovenox with eventual change to oral anticoagulation      Diabetes mellitus type 2, uncontrolled    -ssi  -goal A1c is higher in him due to recurrence of laryngeal cancer       Laryngeal cancer    -ENT to see       COPD (chronic obstructive pulmonary disease)    -continue to home inhaler and prn treatments        Vocal cord paralysis       Dysphagia  -peg tube with feeds  -npo per speech        Decubitus ulcer of coccyx, unspecified pressure ulcer stage     Fibrotic changes at the bases of the lungs likely from chronic aspiration and pneumonitis.  Patient also has a nodule concern for metastatic disease.  We will ask the pulmonary service to see the patient consultation.     Acute resp failure with hypoxia due to PE, with sats of 93 on 4 liters     HTN- continue with coreg and lisinopril    DISPO: TBELLA Shipley MD  [facility]  02/13/19  3:56 PM

## 2019-02-13 NOTE — PLAN OF CARE
Problem: Patient Care Overview  Goal: Plan of Care Review  Outcome: Ongoing (interventions implemented as appropriate)   02/13/19 1424 02/13/19 1818   Coping/Psychosocial   Plan of Care Reviewed With patient --    Plan of Care Review   Progress --  improving   OTHER   Outcome Summary --  Wean 02. changed to low air loss mattress. SLP consult. Video swallow in am. Bolus TFs. CTM      Goal: Individualization and Mutuality  Outcome: Ongoing (interventions implemented as appropriate)    Goal: Discharge Needs Assessment  Outcome: Ongoing (interventions implemented as appropriate)    Goal: Interprofessional Rounds/Family Conf  Outcome: Ongoing (interventions implemented as appropriate)      Problem: Fall Risk (Adult)  Goal: Identify Related Risk Factors and Signs and Symptoms  Outcome: Ongoing (interventions implemented as appropriate)    Goal: Absence of Fall  Outcome: Ongoing (interventions implemented as appropriate)      Problem: VTE, DVT and PE (Adult)  Goal: Signs and Symptoms of Listed Potential Problems Will be Absent, Minimized or Managed (VTE, DVT and PE)  Outcome: Ongoing (interventions implemented as appropriate)      Problem: Skin Injury Risk (Adult)  Goal: Skin Health and Integrity  Outcome: Ongoing (interventions implemented as appropriate)

## 2019-02-13 NOTE — PLAN OF CARE
Problem: Patient Care Overview  Goal: Plan of Care Review   02/13/19 2695   Coping/Psychosocial   Plan of Care Reviewed With patient   OTHER   Outcome Summary Bedside swallow completed. Pt admits to coughing with PO at home. OME revealed breathy voice with harsh quality. Cough fair, suspected false vocal cord recruitment. Intermittent wet voice with ice, difficult to assess 2/2 dysphonia. Hyolaryngeal elevation slightly reduced upon palpation.     SLP recs NPO w/ tube feedings and to allow ice chips sparingly after oral care. Of note, pt exhibited nirali aspiration on esophagram in December with cough. An instrumental assessment was also recommended following assessment. SLP provided education to patient on two choices for instrumental assessment (FEES, MBSS).     Pt is to decide if he wishes to proceed with SLP's recs to pursue further assessment of swallow function. Pt aware of the aspiration risks (death, asp pna, prolonged hospitalization) and teach back provided. Pt to discuss with family. RN notified.

## 2019-02-13 NOTE — CONSULTS
Group: [facility]         CONSULT NOTE    Patient Identification:    Rosalio Rene  79 y.o.  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  Patient did have persistent dysphagia and underwent a G-tube placement.  He says that he has not been as active since discharge.  Started noticing increasing shortness of breath for the last 3 days he did have persistent bilateral edema and intermittent cough.  CT chest was done which showed evidence of acute right-sided pulmonary embolism.  We are asked to be involved in the care of the patient given known lung disease and new CAT scan changes.  Patient states that he is feeling better since admission now.    Objective     Review of Systems:  Constitutional: No fever, chills, weight loss or loss of appetite.   ENMT: No sinus congestion, post nasal drip, epistaxis, sore throat  Cardiovascular: No chest pain, palpitation but + legs swelling.    Respiratory: No hemoptysis, orthopnea, PND.  Gastrointestinal: No constipation, diarrhea or abdominal pain   Neurology: No headache, focal weakness, numbness or dizziness.   Musculoskeletal: No joint stiffness or swelling.   Psychiatry: No agitation or behavioral changes  Lymphatic: No swollen neck glands.  Integumentary: No rash.    Past Medical History:  Past Medical History:   Diagnosis Date    • Abdominal cramping     AT TIMES   • Anxiety    • Cellulitis     BILATERAL LOWER EXTREMITIES   • Chronic cough    • COPD (chronic obstructive pulmonary disease) (CMS/HCC)    • Dysphasia    • Edema, lower extremity    • Enlarged heart     PER PATIENT?? STATES SAW CARDIOLOGIST YRS AGO BUT DOES NOT REMEMBER NAME    • History of laryngeal cancer     HISTORY OF LARYNGECTOMY, RADIATION   • Hyperlipidemia    • Hypertension    • Hypogonadism male    • Type 2 diabetes mellitus (CMS/HCC)    • Vocal cord paralysis        Past Surgical History:  Past Surgical History:   Procedure Laterality Date   • CHOLECYSTECTOMY     • ENDOSCOPY N/A 2/2/2019    Procedure: ESOPHAGOGASTRODUODENOSCOPY;  Surgeon: Twin Bobby MD;  Location: [facility] OR;  Service: Gastroenterology   • LARYNGOSCOPY N/A 1/29/2019    Procedure: DIRECT SUSPENSION MICROLARYNGOSCOPY BIOPSY AND POSSIBLE CERVICE ESOPHAGOSCOPY;  Surgeon: Michael Bhagat MD;  Location: [facility] OR;  Service: ENT   • PEG TUBE INSERTION N/A 2/2/2019    Procedure: PERCUTANEOUS ENDOSCOPIC GASTROSTOMY TUBE INSERTION;  Surgeon: Twin Bobby MD;  Location: [facility];  Service: Gastroenterology   • PROSTATECTOMY  2004   • TOTAL LARYNGECTOMY  2005        Home Meds:  Medications Prior to Admission   Medication Sig Dispense Refill Last Dose   • albuterol sulfate  (90 Base) MCG/ACT inhaler Inhale 2 puffs Every 4 (Four) Hours As Needed for Wheezing.   1/29/2019 at 0300   • amoxicillin (AMOXIL) 500 MG capsule Take 1,000 mg by mouth 2 (Two) Times a Day. ALTERNATES WEEKS WITH CIPRO   1/28/2019 at Unknown time   • aspirin 81 MG tablet Take 81 mg by mouth Daily. HELD FOR OR   Past Week at Unknown time   • carvedilol (COREG) 3.125 MG tablet Take 3.125 mg by mouth 2 (Two) Times a Day With Meals.   1/29/2019 at 0300   • ciprofloxacin (CIPRO) 500 MG tablet Take 500 mg by mouth 2 (Two) Times a Day. ALTERNATES WEEKS WITH AMOXICILLIN   1/26/2019 at Unknown time   •  clotrimazole-betamethasone (LOTRISONE) cream Apply  topically to the appropriate area as directed 2 (Two) Times a Day.      • diphenoxylate-atropine (LOMOTIL) 2.5-0.025 MG per tablet Take 1 tablet by mouth 4 (Four) Times a Day As Needed for Diarrhea.   Unknown at Unknown time   • econazole nitrate (SPECTAZOLE) 1 % cream Apply topically.      • fluticasone (FLONASE) 50 MCG/ACT nasal spray 2 sprays into the nostril(s) as directed by provider 2 (Two) Times a Day.   Unknown at Unknown time   • glimepiride (AMARYL) 4 MG tablet Take 4 mg by mouth Every Morning Before Breakfast.   1/28/2019 at Unknown time   • glimepiride (AMARYL) 4 MG tablet Take 8 mg by mouth Every Evening.   1/28/2019 at Unknown time   • ipratropium-albuterol (DUO-NEB) 0.5-2.5 mg/3 ml nebulizer Take 3 mL by nebulization Every 4 (Four) Hours As Needed for Wheezing.      • lidocaine-prilocaine (EMLA) cream Apply  topically to the appropriate area as directed As Needed.   1/29/2019 at Unknown time   • lisinopril (PRINIVIL,ZESTRIL) 20 MG tablet Take 20 mg by mouth Daily.   1/28/2019 at 0800   • metFORMIN (GLUCOPHAGE) 1000 MG tablet Take 1,000 mg by mouth 2 (Two) Times a Day.   1/28/2019 at Unknown time   • montelukast (SINGULAIR) 10 MG tablet Take 10 mg by mouth Every Night.      • umeclidinium-vilanterol (ANORO ELLIPTA) 62.5-25 MCG/INH aerosol powder  inhaler Inhale 1 puff Daily.   1/29/2019 at Unknown time   • JANUVIA 50 MG tablet Take 50 mg by mouth Daily. CURRENTLY NOT TAKING D/T COST  0 Unknown at Unknown time       Allergies:  No Known Allergies    Social History:   Social History     Socioeconomic History   • Marital status: Single     Spouse name: Not on file   • Number of children: Not on file   • Years of education: Not on file   • Highest education level: Not on file   Social Needs   • Financial resource strain: Not on file   • Food insecurity - worry: Not on file   • Food insecurity - inability: Not on file   • Transportation needs - medical:  "Not on file   • Transportation needs - non-medical: Not on file   Occupational History   • Not on file   Tobacco Use   • Smoking status: Former Smoker     Packs/day: 1.50     Years: 50.00     Pack years: 75.00     Types: Cigarettes     Last attempt to quit: 3/8/2004     Years since quittin.9   • Smokeless tobacco: Never Used   Substance and Sexual Activity   • Alcohol use: Yes     Comment: SOCIAL   • Drug use: No   • Sexual activity: Defer   Other Topics Concern   • Not on file   Social History Narrative   • Not on file       Family History:  Family History   Problem Relation Age of Onset   • Cancer Other    • Heart disease Other    • Malig Hyperthermia Neg Hx        Physical Exam:  /67 (BP Location: Right arm, Patient Position: Lying)   Pulse 90   Temp 98.5 °F (36.9 °C) (Oral)   Resp 18   Ht 180.3 cm (71\")   Wt 84.8 kg (187 lb)   SpO2 97%   BMI 26.08 kg/m²  Body mass index is 26.08 kg/m². 97% 84.8 kg (187 lb)    Physical Exam     Constitutional: Elderly WM, No acute distress, + accessory muscle use  Head: - NCAT  Eyes: No pallor, Anicteric conjunctiva, EOMI.  ENMT:  Mallampati 3, no oral thrush. No palpable cervical lymphadenopathy, Hoarse.  Heart: RRR, no murmur  Lungs: RONI +, No wheezes. B/l mild basillar crackles heard    Abdomen: Soft. No tenderness, guarding or rigidity  Extremities: Extremities warm and well perfused, Chronic venous stasis. 1+ pitting edema  Neuro: Conscious, alert, oriented x3, no gross focal neuro deficits    Lab Review:   LABS:  Lab Results   Component Value Date    CALCIUM 9.2 2019     Results from last 7 days   Lab Units 19  1824 19  0624 19  0450   MAGNESIUM mg/dL 1.8  --   --    SODIUM mmol/L 140 139 138   POTASSIUM mmol/L 4.6 4.1 4.1   CHLORIDE mmol/L 98 98 100   CO2 mmol/L 32.8* 29.6* 29.3*   BUN mg/dL 31* 17 18   CREATININE mg/dL 0.76 0.65* 0.68*   GLUCOSE mg/dL 102* 170* 161*   CALCIUM mg/dL 9.2 9.2 8.7   WBC 10*3/mm3 9.18 11.77*  --  "   HEMOGLOBIN g/dL 12.2* 12.2*  --    PLATELETS 10*3/mm3 239 221  --    ALT (SGPT) U/L 25  --   --    AST (SGOT) U/L 12  --   --    PROBNP pg/mL 1,665.0  --   --      Lab Results   Component Value Date    TROPONINT <0.010 02/12/2019                 Results from last 7 days   Lab Units 02/12/19  1924   NITRITE UA  Negative             Imaging: I personally visualized the images of chest scans/ x-rays performed within last 3 days and summarized below.    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  Agree with evaluation by cardiology about pulmonary embolism.  Agree with anticoagulation plan.  We will get a pro-calcitonin but there is low suspicion for pneumonia.  He likely has silent aspiration.  Agree with video laryngoscopy.  New left lower lobe lung nodule is certainly concerning with recent diagnosis of squamous cell cancer.  We will need outpatient PET scan.  We will have to coordinate that with ENT.  Interstitial lung changes from bilateral chronic aspiration.  Wean supplemental oxygen as tolerated  Would be okay to discharge soon to continue with outpatient plan of treatment for cancer.     Thank you for letting me participate in the care of this pleasant patient.  I have discussed my findings and recommendations with patient and nursing staff.     Rudy Cook MD  2/13/2019  6:05 PM

## 2019-02-13 NOTE — NURSING NOTE
WOCN consult: _Pressure injury DTI on Coccyx/ Buttock. Significant change since last admission.  Patient states he sits up long intervals due to breathing issues. Please order waffle cushion and heel boots  Low Air loss mattress ordered.      02/13/19 1500   Wound 01/30/19 0800 Bilateral gluteal pressure injury   Date first assessed/Time first assessed: 01/30/19 0800   Present On Admission : yes  Side: Bilateral  Location: gluteal  Type: pressure injury  Stage, Pressure Injury: deep tissue injury   Dressing Appearance open to air   Base purple   Periwound blanchable   Wound Length (cm) (@ 11/4cm left [id number] right)   Care, Wound (recommend venelex)

## 2019-02-13 NOTE — ED NOTES
Pt requests tube feeding at this time; MD Israel notified. Pt to wait until results back from CT, pt notified.      Santa Samuel RN  02/12/19 2051

## 2019-02-13 NOTE — SIGNIFICANT NOTE
02/13/19 1449   Rehab Time/Intention   Evaluation Not Performed other (see comments)  (SLP followed up with patient, patient requesting VFSS next date. )   Rehab Treatment   Discipline speech language pathologist

## 2019-02-13 NOTE — PROGRESS NOTES
Name: Rosalio Rene ADMIT: 2019   : 1940  PCP: Erinn Howard MD    MRN: [id number] LOS: 1 days   AGE/SEX: 79 y.o. male  ROOM: [facility]   Subjective     Shortness of breath continues today.  On 4 L nasal cannula, no oxygen at baseline  No chest pain or palpitations  Has been cleared restart his tube feeding  No nausea or vomiting  Duplex today shows right calf DVT  Objective   Vital Signs  Temp:  [97.6 °F (36.4 °C)-98.5 °F (36.9 °C)] 98.5 °F (36.9 °C)  Heart Rate:  [65-90] 90  Resp:  [18-24] 18  BP: (116-140)/(58-73) 140/67  SpO2:  [88 %-98 %] 97 %  on  Flow (L/min):  [2-4] 3;   Device (Oxygen Therapy): nasal cannula  Body mass index is 26.08 kg/m².    Physical Exam   Constitutional: He is oriented to person, place, and time. No distress.   HENT:   Head: Normocephalic and atraumatic.   Eyes: EOM are normal. Pupils are equal, round, and reactive to light. No scleral icterus.   Neck: No JVD present.   Cardiovascular: Normal rate and regular rhythm.   No murmur heard.  Pulmonary/Chest: Effort normal and breath sounds normal. No respiratory distress. He has no wheezes. He has no rales.   Abdominal: Soft. Bowel sounds are normal. He exhibits no distension. There is no tenderness. There is no guarding.   Left-sided PEG tube without erythema or warmth   Musculoskeletal: He exhibits edema (1+ bilaterally, chronic venous stasis changes).   Neurological: He is alert and oriented to person, place, and time. No cranial nerve deficit.   Very soft spoken   Skin: Skin is warm and dry. No rash noted. He is not diaphoretic. No erythema.   Psychiatric: His behavior is normal.       Results Review:       I reviewed the patient's new clinical results.  Results from last 7 days   Lab Units 19  1824 19  0624   WBC 10*3/mm3 9.18 11.77*   HEMOGLOBIN g/dL 12.2* 12.2*   PLATELETS 10*3/mm3 239 221     Results from last 7 days   Lab Units 19  1824 19  0624 19  0450   SODIUM mmol/L 140 139 138    POTASSIUM mmol/L 4.6 4.1 4.1   CHLORIDE mmol/L 98 98 100   CO2 mmol/L 32.8* 29.6* 29.3*   BUN mg/dL 31* 17 18   CREATININE mg/dL 0.76 0.65* 0.68*   GLUCOSE mg/dL 102* 170* 161*   Estimated Creatinine Clearance: 89.8 mL/min (by C-G formula based on SCr of 0.76 mg/dL).  Results from last 7 days   Lab Units 02/12/19  1824   ALBUMIN g/dL 3.30*   BILIRUBIN mg/dL 0.4   ALK PHOS U/L 40   AST (SGOT) U/L 12   ALT (SGPT) U/L 25     Results from last 7 days   Lab Units 02/12/19  1824 02/08/19  0624 02/07/19  0450   CALCIUM mg/dL 9.2 9.2 8.7   ALBUMIN g/dL 3.30*  --   --    MAGNESIUM mg/dL 1.8  --   --            carvedilol 3.125 mg Oral BID With Meals   castor oil-balsam peru  Topical Q12H   enoxaparin 1 mg/kg Subcutaneous Q12H   insulin lispro 0-9 Units Subcutaneous 4x Daily With Meals & Nightly   lisinopril 20 mg Oral Daily   montelukast 10 mg Oral Nightly   sodium chloride 3 mL Intravenous Q12H      NPO Diet      Assessment/Plan      Active Hospital Problems    Diagnosis Date Noted   • Pulmonary embolism on right (CMS/[facility]) [I26.99] 02/12/2019   • COPD (chronic obstructive pulmonary disease) (CMS/[facility]) [J44.9] 02/03/2019   • Vocal cord paralysis [J38.00] 02/03/2019   • Decubitus ulcer of coccyx, unspecified pressure ulcer stage [L89.159] 02/03/2019   • Laryngeal cancer (CMS/[facility]) [C32.9] 01/29/2019   • Dysphagia [R13.10] 01/24/2019   • Diabetes mellitus type 2, uncontrolled (CMS/[facility]) [E11.65] 01/21/2016      Resolved Hospital Problems   No resolved problems to display.     Pulmonary embolism on right    -echo results pending  -cardiology has seen, no right heart strain  -lovenox  -right calf DVT on duplex  -continue lovenox with eventual change to oral anticoagulation      Diabetes mellitus type 2, uncontrolled    -ssi  -goal A1c is higher in him due to recurrence of laryngeal cancer       Laryngeal cancer    -ENT to see       COPD (chronic obstructive pulmonary disease)    -continue to home inhaler and prn treatments        Vocal cord paralysis       Dysphagia  -peg tube with feeds  -npo per speech        Decubitus ulcer of coccyx, unspecified pressure ulcer stage     Fibrotic changes at the bases of the lungs likely from chronic aspiration and pneumonitis.  Patient also has a nodule concern for metastatic disease.  We will ask the pulmonary service to see the patient consultation.     Acute resp failure with hypoxia due to PE, with sats of 93 on 4 liters     He DOES NOT have chronic cellulitis.  No need for chronic antibiotics. He has chronic venous stasis    HTN- continue with coreg and lisinopril    DISPO: TBD    Chema Shipley MD  [facility]  02/13/19  4:02 PM

## 2019-02-13 NOTE — H&P
Patient Care Team:  Erinn Howard MD as PCP - General (Internal Medicine)  Erinn Howard MD as PCP - Family Medicine  LuigiNida mcfarlane MD as Consulting Physician (Radiation Oncology)    Chief complaint soa    Subjective     History of Present Illness    Pt is a 79 y.o. h/o Type II DM, HTN, COPD, and hyperlipidemia with a prior diagnosis of laryngeal cancer and now with recurrence of his cancer who was just here in the hospital at [facility] with a brief stay in the ICU after a biopsy who comes in tonight with soa.  The SOA is worsening since his discharge 3 days ago. Pt also c/o BLE edema and cough. He states he coughs when swallowing, he has a peg and he has refused tracheostomy.       He is currently on Cipro and amoxicillin for his BLE cellulitis which he stays on chronically.  Pt is not on O2 at home.     The patient was mentioned above was admitted on January 29th for laryngeal biopsy and post op went icu, he was treated for aspiration pneumonia.  He had a peg placed on 2/2/19. He declined a trach. He was also treated with steroids and improved.    The patient had Laryngeal cancer in 2005 and was treated with resection and 7 weeks of radiation.    In the Er he was found to have pulmonary embolisms by Ct scan and was given lovenox.    Using tube feeds at home and cough on all oral intake, but he has been taking a soft diet home.    Review of Systems   Constitutional: Positive for unexpected weight change ( lost 60 lbs prior to the biopsy). Negative for activity change, appetite change ( tolerating a soft diet), chills, diaphoresis, fatigue and fever.   HENT: Negative for congestion and hearing loss.    Eyes: Negative for redness and visual disturbance.   Respiratory: Positive for cough and shortness of breath. Negative for chest tightness.    Cardiovascular: Negative for chest pain and palpitations.   Gastrointestinal: Positive for diarrhea ( from metformin and tube feed). Negative for abdominal  distention, abdominal pain, blood in stool, nausea and vomiting.   Endocrine: Negative for cold intolerance and heat intolerance.   Genitourinary: Negative for difficulty urinating, dysuria and frequency.   Musculoskeletal: Negative for arthralgias, back pain and myalgias.   Skin: Positive for rash. Negative for color change and wound.   Neurological: Negative for syncope, weakness and headaches.   Hematological: Negative for adenopathy. Does not bruise/bleed easily.   Psychiatric/Behavioral: Negative for confusion. The patient is not nervous/anxious.         Past Medical History:   Diagnosis Date   • Abdominal cramping     AT TIMES   • Anxiety    • Cellulitis     BILATERAL LOWER EXTREMITIES   • Chronic cough    • COPD (chronic obstructive pulmonary disease) (CMS/HCC)    • Dysphasia    • Edema, lower extremity    • Enlarged heart     PER PATIENT?? STATES SAW CARDIOLOGIST YRS AGO BUT DOES NOT REMEMBER NAME    • History of laryngeal cancer     HISTORY OF LARYNGECTOMY, RADIATION   • Hyperlipidemia    • Hypertension    • Hypogonadism male    • Type 2 diabetes mellitus (CMS/HCC)    • Vocal cord paralysis      Past Surgical History:   Procedure Laterality Date   • CHOLECYSTECTOMY     • ENDOSCOPY N/A 2/2/2019    Procedure: ESOPHAGOGASTRODUODENOSCOPY;  Surgeon: Twin Bobby MD;  Location: [facility] OR;  Service: Gastroenterology   • LARYNGOSCOPY N/A 1/29/2019    Procedure: DIRECT SUSPENSION MICROLARYNGOSCOPY BIOPSY AND POSSIBLE CERVICE ESOPHAGOSCOPY;  Surgeon: Michael Bhagat MD;  Location: [facility] OR;  Service: ENT   • PEG TUBE INSERTION N/A 2/2/2019    Procedure: PERCUTANEOUS ENDOSCOPIC GASTROSTOMY TUBE INSERTION;  Surgeon: Twin Bobby MD;  Location: [facility] OR;  Service: Gastroenterology   • PROSTATECTOMY  2004   • TOTAL LARYNGECTOMY  2005     Family History   Problem Relation Age of Onset   • Cancer Other    • Heart disease Other    • Malig Hyperthermia Neg Hx      Social History      Tobacco Use   • Smoking status: Former Smoker     Packs/day: 1.50     Years: 50.00     Pack years: 75.00     Types: Cigarettes     Last attempt to quit: 3/8/2004     Years since quittin.9   • Smokeless tobacco: Never Used   Substance Use Topics   • Alcohol use: Yes     Comment: SOCIAL   • Drug use: No     Medications Prior to Admission   Medication Sig Dispense Refill Last Dose   • albuterol sulfate  (90 Base) MCG/ACT inhaler Inhale 2 puffs Every 4 (Four) Hours As Needed for Wheezing.   2019 at 0300   • amoxicillin (AMOXIL) 500 MG capsule Take 1,000 mg by mouth 2 (Two) Times a Day. ALTERNATES WEEKS WITH CIPRO   2019 at Unknown time   • aspirin 81 MG tablet Take 81 mg by mouth Daily. HELD FOR OR   Past Week at Unknown time   • carvedilol (COREG) 3.125 MG tablet Take 3.125 mg by mouth 2 (Two) Times a Day With Meals.   2019 at 0300   • ciprofloxacin (CIPRO) 500 MG tablet Take 500 mg by mouth 2 (Two) Times a Day. ALTERNATES WEEKS WITH AMOXICILLIN   2019 at Unknown time   • clotrimazole-betamethasone (LOTRISONE) cream Apply  topically to the appropriate area as directed 2 (Two) Times a Day.      • diphenoxylate-atropine (LOMOTIL) 2.5-0.025 MG per tablet Take 1 tablet by mouth 4 (Four) Times a Day As Needed for Diarrhea.   Unknown at Unknown time   • econazole nitrate (SPECTAZOLE) 1 % cream Apply topically.      • fluticasone (FLONASE) 50 MCG/ACT nasal spray 2 sprays into the nostril(s) as directed by provider 2 (Two) Times a Day.   Unknown at Unknown time   • glimepiride (AMARYL) 4 MG tablet Take 4 mg by mouth Every Morning Before Breakfast.   2019 at Unknown time   • glimepiride (AMARYL) 4 MG tablet Take 8 mg by mouth Every Evening.   2019 at Unknown time   • ipratropium-albuterol (DUO-NEB) 0.5-2.5 mg/3 ml nebulizer Take 3 mL by nebulization Every 4 (Four) Hours As Needed for Wheezing.      • lidocaine-prilocaine (EMLA) cream Apply  topically to the appropriate area as  directed As Needed.   1/29/2019 at Unknown time   • lisinopril (PRINIVIL,ZESTRIL) 20 MG tablet Take 20 mg by mouth Daily.   1/28/2019 at 0800   • metFORMIN (GLUCOPHAGE) 1000 MG tablet Take 1,000 mg by mouth 2 (Two) Times a Day.   1/28/2019 at Unknown time   • montelukast (SINGULAIR) 10 MG tablet Take 10 mg by mouth Every Night.      • umeclidinium-vilanterol (ANORO ELLIPTA) 62.5-25 MCG/INH aerosol powder  inhaler Inhale 1 puff Daily.   1/29/2019 at Unknown time   • JANUVIA 50 MG tablet Take 50 mg by mouth Daily. CURRENTLY NOT TAKING D/T COST  0 Unknown at Unknown time     Allergies:  Patient has no known allergies.    Objective      Vital Signs  Temp:  [98.2 °F (36.8 °C)-98.3 °F (36.8 °C)] 98.2 °F (36.8 °C)  Heart Rate:  [67-87] 82  Resp:  [18-24] 20  BP: (116-140)/(58-73) 121/73    Physical Exam   Constitutional: He appears well-developed and well-nourished. No distress.   Wet voice   HENT:   Head: Normocephalic and atraumatic.   Eyes: Conjunctivae and EOM are normal.   Cardiovascular: Normal rate, regular rhythm and normal heart sounds. Exam reveals no gallop and no friction rub.   No murmur heard.  Pulmonary/Chest: Effort normal and breath sounds normal. He has no wheezes. He has no rales.   Abdominal: Soft. Bowel sounds are normal. He exhibits no distension and no mass. There is no tenderness. There is no rebound and no guarding.   Musculoskeletal: He exhibits edema.   Neurological: He is alert.   Skin: Skin is warm and dry. Rash noted. He is not diaphoretic. There is erythema.   Legs erythema bilaterlly   Psychiatric: He has a normal mood and affect. His behavior is normal.       Results Review:   I reviewed the patient's new clinical results.  I reviewed the patient's new imaging results and agree with the interpretation.  I personally viewed and interpreted the patient's EKG/Telemetry data      Assessment/Plan       Pulmonary embolism on right (CMS/HCC)    Diabetes mellitus type 2, uncontrolled (CMS/[facility])     Laryngeal cancer (CMS/[facility])    COPD (chronic obstructive pulmonary disease) (CMS/[facility])    Vocal cord paralysis    Dysphagia    Decubitus ulcer of coccyx, unspecified pressure ulcer stage      Assessment & Plan    Pulmonary embolism on right (CMS/[facility])  -echo  -cardiology to see because of concern for right heart strain on the ct scan  -lovenox  -leg edema so will hold on fluids and ultimately needs diuretics  -lower extremity dopplers    Diabetes mellitus type 2, uncontrolled (CMS/[facility])  -ssi      Laryngeal cancer (CMS/[facility])  -ENT to see      COPD (chronic obstructive pulmonary disease) (CMS/[facility])  -continue to home inhaler and prn treatments      Vocal cord paralysis      Dysphagia  -peg tube with feeds  -npo until seen by speech tx      Decubitus ulcer of coccyx, unspecified pressure ulcer stage    Fibrotic changes at the bases of the lungs likely from chronic aspiration and pneumonitis.  Patient also has a nodule concern for metastatic disease.  We will ask the pulmonary service to see the patient consultation.    resp failure with sats of 93 on 2 liters    HTN- continue with coreg and lisinopril    I discussed the patients findings and my recommendations with patient, nursing staff and consulting provider    Alo Ceballos MD  02/12/19  11:54 PM    Patient was seen and examined on 2/12 and documentation was completed on 2/13.

## 2019-02-13 NOTE — CONSULTS
Adult Nutrition  Assessment/PES    Patient Name:  Rosalio Rene  YOB: 1940  MRN: [id number]  Admit Date:  2/12/2019    Assessment Date:  2/13/2019    Comments:  Nutrition assessment completed. Per past RD notes and discussion with pt, recommend diet per MD and bolus PEG feeds of 360cc Nutren 1.5 x4/day( Breakfast, Lunch, Dinner, and HS). If po intake at meal time is 50% can adjust bolus to 180cc. Water flushes per MD.    Reason for Assessment     Row Name 02/13/19 0811          Reason for Assessment    Reason For Assessment  identified at risk by screening criteria;TF/PN     Diagnosis  -- Pulmonary embolism, laryngeal cancer, dysphagia, DM, COPD,         Nutrition/Diet History     Row Name 02/13/19 0813          Nutrition/Diet History    Typical Food/Fluid Intake  pt to give Nutren 1.5 bolus feeds pending his intake per meal         Anthropometrics     Row Name 02/13/19 0814          Body Mass Index (BMI)    BMI Assessment  BMI 25-29.9: overweight         Labs/Tests/Procedures/Meds     Row Name 02/13/19 0814          Labs/Procedures/Meds    Lab Results Reviewed  reviewed     Lab Results Comments  glu, alb,        Diagnostic Tests/Procedures    Diagnostic Test/Procedure Reviewed  reviewed        Medications    Pertinent Medications Reviewed  reviewed     Pertinent Medications Comments  lovenox, insulin         Physical Findings     Row Name 02/13/19 0815          Physical Findings    Overall Physical Appearance  on oxygen therapy     Gastrointestinal  feeding tube     Tubes  PEG     Skin  pressure injury bilateral gluteral, abd incision         Estimated/Assessed Needs     Row Name 02/13/19 0816          Calculation Measurements    Weight Used For Calculations  84.8 kg (186 lb 15.2 oz)        Estimated/Assessed Needs    Additional Documentation  -- 2120-2500kcals, 85-100gm pro, 2120cc fluid        KCAL/KG    14 Kcal/Kg (kcal)  1187.2     15 Kcal/Kg (kcal)  1272     18 Kcal/Kg (kcal)  1526.4     20  Kcal/Kg (kcal)  1696     25 Kcal/Kg (kcal)  2120     30 Kcal/Kg (kcal)  2544     35 Kcal/Kg (kcal)  2968     40 Kcal/Kg (kcal)  3392     45 Kcal/Kg (kcal)  3816     50 Kcal/Kg (kcal)  4240        Washington-St. Jeor Equation    RMR (Washington-St. Jeor Equation)  1585.13        Fluid Requirements    Nellie-Toby Method (over 20 kg)  3196         Nutrition Prescription Ordered     Row Name 02/13/19 0817          Nutrition Prescription PO    Current PO Diet  -- no order         Evaluation of Received Nutrient/Fluid Intake     Row Name 02/13/19 0816          Calculation Measurements    Weight Used For Calculations  84.8 kg (186 lb 15.2 oz)         Evaluation of Prescribed Nutrient/Fluid Intake     Row Name 02/13/19 0816          Calculation Measurements    Weight Used For Calculations  84.8 kg (186 lb 15.2 oz)             Problem/Interventions:  Problem 1     Row Name 02/13/19 0818          Nutrition Diagnoses Problem 1    Problem 1  Swallowing Difficulty     Etiology (related to)  -- laryngeal cancer with PEG to supplement po                 Intervention Goal     Row Name 02/13/19 0819          Intervention Goal    General  Maintain nutrition     PO  Initiate feeding     TF/PN  Inititiate TF/PN     Weight  Maintain weight         Nutrition Intervention     Row Name 02/13/19 0820          Nutrition Intervention    RD/Tech Action  Follow Tx progress;Care plan reviewd         Nutrition Prescription     Row Name 02/13/19 0820          Nutrition Prescription EN    Enteral Prescription  Enteral begin/change;Enteral to supply     Enteral Route  PEG     Product  Other (comment) Nutren 1.5     TF Delivery Method  Bolus     TF Bolus Goal Volume (mL)  360 mL for BLD if no po give 360cc, if po 50% give 180cc, give 360cc for every HS     TF Bolus Frequency  4 times a day        EN to Supply    Kcal/Day  2160 Kcal/Day     Protein (gm/day)  97 gm/day     Meet Estimated Kcal Need (%)  100 %     Meet Estimated Protein Need (%)  100 %      TF Free H2O (mL)  1094 mL             Electronically signed by:  Naila Francis RD  02/13/19 8:24 AM

## 2019-02-14 NOTE — CONSULTS
Adult Nutrition  Assessment/PES    Patient Name:  Rosalio Rene  YOB: 1940  MRN: [id number]  Admit Date:  2/12/2019    Assessment Date:  2/14/2019    Comments:  Pt receiving Nutren 1.5 360cc x 4 per day plus water flushes. Note speech rec's of NPO and pt refusing and understands risk.     Reason for Assessment     Row Name 02/14/19 1044          Reason for Assessment    Reason For Assessment  follow-up protocol;TF/PN     Identified At Risk by Screening Criteria  difficulty chewing/swallowing         Nutrition/Diet History     Row Name 02/14/19 1044          Nutrition/Diet History    Typical Food/Fluid Intake  VFSS completed and pt refusing NPO recommendations         Anthropometrics     Row Name 02/14/19 0500          Anthropometrics    Weight  86.2 kg (190 lb)         Labs/Tests/Procedures/Meds     Row Name 02/14/19 1045          Labs/Procedures/Meds    Lab Results Reviewed  reviewed        Diagnostic Tests/Procedures    Diagnostic Test/Procedure Reviewed  reviewed     Diagnostic Test/Procedures Comments  VFSS        Medications    Pertinent Medications Reviewed  reviewed         Physical Findings     Row Name 02/14/19 1045          Physical Findings    Overall Physical Appearance  on oxygen therapy     Gastrointestinal  feeding tube     Tubes  PEG     Skin  pressure injury;edema bilateral gluteral, abd incision           Nutrition Prescription Ordered     Row Name 02/14/19 1046          Nutrition Prescription PO    Current PO Diet  NPO        Nutrition Prescription EN    Enteral Route  PEG     Product  Nutren 1.5 (Osmolite 1.5)     TF Delivery Method  Bolus     TF Bolus Goal Volume (mL)  360 mL     TF Bolus Current Volume (mL)  360 mL     TF Bolus Frequency  4 times a day     Water flush (mL)   60 mL     Water Flush Frequency  Every feeding         Evaluation of Received Nutrient/Fluid Intake     Row Name 02/14/19 1047          Calories Evaluation    Enteral Calories (kcal)  2160     % of Kcal  Needs  101        Protein Evaluation    Enteral Protein (gm)  97     % of Protein Needs  100        Fluid Intake Evaluation    Enteral (Free Water) Fluid (mL)  1094     Free Water Flush Fluid (mL)  240     Total Free Water Intake (mL)  1334 mL        EN Evaluation    HOB  Greater than or equal to 30 degress               Problem/Interventions:            Intervention Goal     Row Name 02/14/19 1049          Intervention Goal    General  Maintain nutrition;Nutrition support treatment     PO  Tolerate PO     TF/PN  Tolerate TF at goal     Weight  Maintain weight         Nutrition Intervention     Row Name 02/14/19 1050          Nutrition Intervention    RD/Tech Action  Follow Tx progress;Care plan reviewd           Education/Evaluation     Row Name 02/14/19 1050          Education    Education  Will Instruct as appropriate        Monitor/Evaluation    Monitor  Per protocol           Electronically signed by:  Naila Francis RD  02/14/19 10:50 AM

## 2019-02-14 NOTE — THERAPY EVALUATION
Acute Care - Physical Therapy Initial Evaluation  [facility]     Patient Name: Rosalio Rene  : 1940  MRN: [id number]  Today's Date: 2019   Onset of Illness/Injury or Date of Surgery: 19            Admit Date: 2019    Visit Dx:     ICD-10-CM ICD-9-CM   1. Pulmonary embolism on right (CMS/HCC) I26.99 415.19   2. Hypoxia R09.02 799.02   3. Dyspnea, unspecified type R06.00 786.09   4. History of treatment for malignancy Z85.9 V10.90   5. 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)   • Laryngeal cancer (CMS/HCC)   • Acute respiratory failure with hypercapnia (CMS/HCC)   • Dysphagia   • COPD (chronic obstructive pulmonary disease) (CMS/HCC)   • Hypertension   • Type 2 diabetes mellitus (CMS/HCC)   • Vocal cord paralysis   • Aspiration pneumonia of both lower lobes (healthcare associated)   • Acute respiratory failure with hypoxia and hypercapnia (CMS/HCC)   • Hypokalemia   • Decubitus ulcer of coccyx, unspecified pressure ulcer stage   • Pulmonary embolism on right (CMS/HCC)     Past Medical History:   Diagnosis Date   • Abdominal cramping     AT TIMES   • Anxiety    • Cellulitis     BILATERAL LOWER EXTREMITIES   • Chronic cough    • COPD (chronic obstructive pulmonary disease) (CMS/[facility])    • Dysphasia    • Edema, lower extremity    • Enlarged heart     PER PATIENT?? STATES SAW CARDIOLOGIST YRS AGO BUT DOES NOT REMEMBER NAME    • History of laryngeal cancer     HISTORY OF LARYNGECTOMY, RADIATION   • Hyperlipidemia    • Hypertension    • Hypogonadism male    • Type 2 diabetes mellitus (CMS/HCC)    • Vocal cord paralysis      Past Surgical History:   Procedure Laterality  Date   • CHOLECYSTECTOMY     • ENDOSCOPY N/A 2/2/2019    Procedure: ESOPHAGOGASTRODUODENOSCOPY;  Surgeon: Twin Bobby MD;  Location: [facility] OR;  Service: Gastroenterology   • LARYNGOSCOPY N/A 1/29/2019    Procedure: DIRECT SUSPENSION MICROLARYNGOSCOPY BIOPSY AND POSSIBLE CERVICE ESOPHAGOSCOPY;  Surgeon: Michael Bhagat MD;  Location: [facility] OR;  Service: ENT   • PEG TUBE INSERTION N/A 2/2/2019    Procedure: PERCUTANEOUS ENDOSCOPIC GASTROSTOMY TUBE INSERTION;  Surgeon: Twin Bobby MD;  Location: [facility] OR;  Service: Gastroenterology   • PROSTATECTOMY  2004   • TOTAL LARYNGECTOMY  2005        PT ASSESSMENT (last 12 hours)      Physical Therapy Evaluation     Row Name 02/14/19 1019          PT Evaluation Time/Intention    Subjective Information  no complaints  -EM     Document Type  evaluation  -EM     Mode of Treatment  physical therapy  -EM     Patient Effort  good  -EM     Row Name 02/14/19 1019          General Information    Onset of Illness/Injury or Date of Surgery  02/12/19  -EM     Patient Observations  alert;cooperative;agree to therapy  -EM     Patient/Family Observations  family at bedside   -EM     General Observations of Patient   male in supine, awake and alert   -EM     Prior Level of Function  min assist:;all household mobility pt current with home health   -EM     Equipment Currently Used at Home  walker, rolling  -EM     Pertinent History of Current Functional Problem  PE (recent hospitalization for laryngeal cancer surgery, post op resp failure, d/c home 2/9/19 with home health)   -EM     Existing Precautions/Restrictions  oxygen therapy device and L/min 2L   -EM     Row Name 02/14/19 1019          Relationship/Environment    Lives With  other relative(s)  -EM     Row Name 02/14/19 1019          Resource/Environmental Concerns    Current Living Arrangements  home/apartment/condo  -EM     Row Name 02/14/19 1019          Cognitive  Assessment/Intervention- PT/OT    Orientation Status (Cognition)  oriented x 4  -EM     Follows Commands (Cognition)  WNL  -EM     Row Name 02/14/19 1019          Bed Mobility Assessment/Treatment    Bed Mobility Assessment/Treatment  supine-sit;sit-supine  -EM     Supine-Sit [place] (Bed Mobility)  contact guard  -EM     Sit-Supine [place] (Bed Mobility)  contact guard  -EM     Row Name 02/14/19 1019          Transfer Assessment/Treatment    Transfer Assessment/Treatment  sit-stand transfer;stand-sit transfer  -EM     Sit-Stand [place] (Transfers)  contact guard  -EM     Stand-Sit [place] (Transfers)  contact guard  -EM     Row Name 02/14/19 1019          Sit-Stand Transfer    Assistive Device (Sit-Stand Transfers)  walker, front-wheeled  -EM     Row Name 02/14/19 1019          Stand-Sit Transfer    Assistive Device (Stand-Sit Transfers)  walker, front-wheeled  -EM     Row Name 02/14/19 1019          Gait/Stairs Assessment/Training    [place] Level (Gait)  contact guard  -EM     Assistive Device (Gait)  walker, front-wheeled  -EM     Distance in Feet (Gait)  85  -EM     Deviations/Abnormal Patterns (Gait)  gait speed decreased;stride length decreased  -EM     Bilateral Gait Deviations  forward flexed posture  -EM     Row Name 02/14/19 1019          General ROM    GENERAL ROM COMMENTS  ROM WNL   -EM     Row Name 02/14/19 1019          MMT (Manual Muscle Testing)    General MMT Comments  no focal deficits identified   -EM     Row Name 02/14/19 1019          Motor Assessment/Intervention    Additional Documentation  Therapeutic Exercise (Group);Therapeutic Exercise Interventions (Group);Balance (Group)  -EM     Row Name 02/14/19 1019          Therapeutic Exercise    Lower Extremity (Therapeutic Exercise)  LAQ (long arc quad), bilateral;marching while seated  -EM     Lower Extremity Range of Motion (Therapeutic Exercise)  ankle dorsiflexion/plantar flexion, bilateral  -EM     Sets/Reps  (Therapeutic Exercise)  1/10  -EM     Row Name 02/14/19 1019          Balance    Balance  static sitting balance;static standing balance  -EM     Row Name 02/14/19 1019          Static Sitting Balance    Level of [place] (Unsupported Sitting, Static Balance)  supervision  -EM     Sitting Position (Unsupported Sitting, Static Balance)  sitting on edge of bed  -EM     Row Name 02/14/19 1019          Static Standing Balance    Level of [place] (Supported Standing, Static Balance)  supervision  -EM     Assistive Device Utilized (Supported Standing, Static Balance)  walker, rolling  -EM     Row Name 02/14/19 1019          Pain Assessment    Additional Documentation  Pain Scale: Numbers Pre/Post-Treatment (Group)  -EM     [facility] Name 02/14/19 1019          Pain Scale: Numbers Pre/Post-Treatment    Pain Scale: Numbers, Pretreatment  0/10 - no pain  -EM     Row Name             Wound 01/30/19 0800 Bilateral gluteal pressure injury    Wound - Properties Group Date first assessed: 01/30/19  -MS Time first assessed: 0800  -MS Present On Admission : yes  -MS Side: Bilateral  -MS Location: gluteal  -MS Type: pressure injury  -MS Stage, Pressure Injury: deep tissue injury  -MS    Row Name 02/14/19 1019          Plan of Care Review    Plan of Care Reviewed With  patient  -EM     [facility] Name 02/14/19 1019          Physical Therapy Clinical Impression    Patient/Family Goals Statement (PT Clinical Impression)  go to rehab, get stronger   -EM     Criteria for Skilled Interventions Met (PT Clinical Impression)  yes;treatment indicated  -EM     Impairments Found (describe specific impairments)  gait, locomotion, and balance;aerobic capacity/endurance  -EM     Rehab Potential (PT Clinical Summary)  good, to achieve stated therapy goals  -EM     Row Name 02/14/19 1019          Vital Signs    Pre SpO2 (%)  93  -EM     O2 Delivery Pre Treatment  supplemental O2  -EM     Post SpO2 (%)  92  -EM     O2 Delivery Post Treatment   supplemental O2  -EM     Row Name 02/14/19 1019          Physical Therapy Goals    Bed Mobility Goal Selection (PT)  bed mobility, PT goal 1  -EM     Transfer Goal Selection (PT)  transfer, PT goal 1  -EM     Gait Training Goal Selection (PT)  gait training, PT goal 1  -EM     Row Name 02/14/19 1019          Bed Mobility Goal 1 (PT)    Activity/Assistive Device (Bed Mobility Goal 1, PT)  bed mobility activities, all  -EM     [place] Level/Cues Needed (Bed Mobility Goal 1, PT)  supervision required  -EM     Time Frame (Bed Mobility Goal 1, PT)  1 week  -EM     Row Name 02/14/19 1019          Transfer Goal 1 (PT)    Activity/Assistive Device (Transfer Goal 1, PT)  sit-to-stand/stand-to-sit;walker, rolling  -EM     [place] Level/Cues Needed (Transfer Goal 1, PT)  supervision required  -EM     Time Frame (Transfer Goal 1, PT)  1 week  -EM     Row Name 02/14/19 1019          Gait Training Goal 1 (PT)    Activity/Assistive Device (Gait Training Goal 1, PT)  walker, rolling  -EM     [place] Level (Gait Training Goal 1, PT)  contact guard assist  -EM     Distance (Gait Goal 1, PT)  150  -EM     Time Frame (Gait Training Goal 1, PT)  1 week  -EM     Row Name 02/14/19 1019          Positioning and Restraints    Pre-Treatment Position  in bed  -EM     Post Treatment Position  bed  -EM     In Bed  supine;call light within reach;with family/caregiver;with nsg  -EM       User Key  (r) = Recorded By, (t) = Taken By, (c) = Cosigned By    Initials Name Provider Type    Stacia Holt PT Physical Therapist    Arina Hill, RN Registered Nurse        Physical Therapy Education     Title: PT OT SLP Therapies (In Progress)     Topic: Physical Therapy (In Progress)     Point: Mobility training (In Progress)     Learning Progress Summary           Patient Acceptance, E, NR by EM at 2/14/2019 10:29 AM                               User Key     Initials Effective Dates Name Provider Type Discipline     04/03/18  -  Stacia Mendez, PT Physical Therapist PT              PT Recommendation and Plan  Anticipated Discharge Disposition (PT): skilled nursing facility  Planned Therapy Interventions (PT Eval): bed mobility training, gait training, home exercise program  Therapy Frequency (PT Clinical Impression): daily  Outcome Summary/Treatment Plan (PT)  Anticipated Discharge Disposition (PT): skilled nursing facility  Plan of Care Reviewed With: patient  Outcome Summary: Patient is a pleasant 79 y.o. [facility] PMHx includes recent hospitalization for laryngeal cancer surgery with post op resp failure, pt d/c home with  on 2/9/19. Patient is independent at baseline prior to previous hospitalization and lives with family. Patient current with home health services, states he was walking with rwx at home prior to this admission. Today, patient performed bed mobility with CGA, required CGA for transfers, and ambulated 85 feet with rwx and CGA.  Patient demonstrates impairments consisting of generalized weakness, decreased activity tolerance. Patient may benefit from skilled PT services acutely to address functional deficits as well as improve level of independence prior to discharge. Anticipate SNU  upon DC.  Outcome Measures     Row Name 02/14/19 1000             How much help from another person do you currently need...    Turning from your back to your side while in flat bed without using bedrails?  3  -EM      Moving from lying on back to sitting on the side of a flat bed without bedrails?  3  -EM      Moving to and from a bed to a chair (including a wheelchair)?  3  -EM      Standing up from a chair using your arms (e.g., wheelchair, bedside chair)?  3  -EM      Climbing 3-5 steps with a railing?  2  -EM      To walk in hospital room?  3  -EM      AM-PAC 6 Clicks Score  17  -EM         Functional Assessment    Outcome Measure Options  AM-PAC 6 Clicks Basic Mobility (PT)  -EM        User Key   (r) = Recorded By, (t) = Taken By, (c) = Cosigned By    Initials Name Provider Type    EM Stacia Mendez PT Physical Therapist         Time Calculation:   PT Charges     Row Name 02/14/19 1032             Time Calculation    Start Time  0955  -EM      Stop Time  1015  -EM      Time Calculation (min)  20 min  -EM      PT Received On  02/14/19  -EM      PT - Next Appointment  02/15/19  -EM      PT Goal Re-Cert Due Date  02/21/19  -EM         Time Calculation- PT    Total Timed Code Minutes- PT  8 minute(s)  -EM        User Key  (r) = Recorded By, (t) = Taken By, (c) = Cosigned By    Initials Name Provider Type    Stacia Holt PT Physical Therapist        Therapy Suggested Charges     Code   Minutes Charges    None           Therapy Charges for Today     Code Description Service Date Service Provider Modifiers Qty    53574274953 HC PT EVAL MOD COMPLEXITY 2 2/14/2019 Stacia Mendez, PT GP 1    03494727788 HC PT THER PROC EA 15 MIN 2/14/2019 Stacia Mendez PT GP 1          PT G-Codes  Outcome Measure Options: AM-PAC 6 Clicks Basic Mobility (PT)  AM-PAC 6 Clicks Score: 17      Stacia Mendez PT  2/14/2019

## 2019-02-14 NOTE — PROGRESS NOTES
Continued Stay Note  [facility]     Patient Name: Rosalio Rene  MRN: [id number]  Today's Date: 2/14/2019    Admit Date: 2/12/2019    Discharge Plan     Row Name 02/14/19 1629       Plan    Plan  [facility], pre-cert initated with [place]    Patient/Family in Agreement with Plan  yes    Plan Comments  Spoke with [facility], pt accepted and will initate pre-cert. She states she already spoke with pt and he is in agreeance. Partial Packet in CCP office. [place] MI/CCP        Discharge Codes    No documentation.             Shelbie Lowry RN

## 2019-02-14 NOTE — PROGRESS NOTES
Continued Stay Note  [facility]     Patient Name: Rosalio Rene  MRN: [id number]  Today's Date: 2/14/2019    Admit Date: 2/12/2019    Discharge Plan     Row Name 02/14/19 1253       Plan    Plan  Referral to [facility] or [facility]- pending [facility] pre-cert    Patient/Family in Agreement with Plan  yes    Plan Comments  Spoke with pt at bedside, introduced self and explained CCP role. Verified facesheet and confirmed pharmacy is [facility] [address]. Pt denies problems with medication costs. Pt states he lives with his niece Itzel and nephew lilliam. Pt denies advance directive but agreeable for CCP to bring Informed decisons pamphlet. Pt has a walker, and nebulizer from [place], cane and glucometer. Pt currently requiring contionous oxygen. Pt also states he is current with [facility] that was assisting with TF. Pt denies hx of SNF but requests referral to [facility] for [facility]hab [place], CCP also explained [facility] as an option and he was interested. [facility] pre-cert process reviewed. He verbalized understanding. Referral to [facility]. Dr. Shipley provided prescription for Eliquis for price check. CCP called [facility] pharmacy and spoke with Kadeem and he states after run thru insurance pts copay is $47.00. Updated Dr. Shipley. bryn ferreira/ccp        Discharge Codes    No documentation.             Shelbie Lowry RN

## 2019-02-14 NOTE — PLAN OF CARE
Problem: Patient Care Overview  Goal: Plan of Care Review   02/14/19 1029   OTHER   Outcome Summary Patient is a pleasant 79 y.o. male admitted to [facility] for PE on 2/12/2019. PMHx includes recent hospitalization for laryngeal cancer surgery with post op resp failure, pt d/c home with  on 2/9/19. Patient is independent at baseline prior to previous hospitalization and lives with family. Patient current with home health services, states he was walking with rwx at home prior to this admission. Today, patient performed bed mobility with CGA, required CGA for transfers, and ambulated 85 feet with rwx and CGA. Patient demonstrates impairments consisting of generalized weakness, decreased activity tolerance. Patient may benefit from skilled PT services acutely to address functional deficits as well as improve level of independence prior to discharge. Anticipate SNU upon DC.

## 2019-02-14 NOTE — DISCHARGE PLACEMENT REQUEST
"RadhaRosalio johnson P (79 y.o. Male)     Date of Birth Social Security Number Address Home Phone MRN    1940  [id number] [phone] [id number]    Taoism Marital Status          Sabianism Single       Admission Date Admission Type Admitting Provider Attending Provider Department, Room/Bed    1/29/19 Urgent Michael Bhagat MD  [facility] [address] [place], P392/1    Discharge Date Discharge Disposition Discharge Destination        2/9/2019 Home-Health Care c              Attending Provider:  (none)   Allergies:  No Known Allergies    Isolation:  None   Infection:  None   Code Status:  CPR    Ht:  180.3 cm (70.98\")   Wt:  88.3 kg (194 lb 9.6 oz)    Admission Cmt:  None   Principal Problem:  Laryngeal cancer (CMS/HCC) [C32.9]                 Active Insurance as of 1/29/2019     Primary Coverage     Payor Plan Insurance Group Employer/Plan Group    ANTHEM MEDICARE REPLACEMENT ANTHEM MEDICARE ADVANTAGE [id number]     Payor Plan Address Payor Plan Phone Number Payor Plan Fax Number Effective Dates    [address] [phone]  6/1/2016 - None Entered    [facility] [id number]       Subscriber Name Subscriber Birth Date Member ID       ROSALIO WEEKS 1940 [id number]                 Emergency Contacts      (Rel.) Home Phone Work Phone Mobile Phone    Alan Hook (Care Giver) [phone] -- --    Benjamin Herman (Friend) [phone] -- [phone]              "

## 2019-02-14 NOTE — PLAN OF CARE
Problem: Patient Care Overview  Goal: Plan of Care Review   02/14/19 0914   Coping/Psychosocial   Plan of Care Reviewed With patient   Plan of Care Review   Progress no change   OTHER   Outcome Summary VFSS completed. Pt exhibited silent penetration/aspiration across consistencies. SLP recs continuing NPO with free water protocol. SLP reviewed images in detail with patient and handout was provided. Pt is politely refusing NPO recs and therapy at this time. Pt voiced understanding of aspiration risks; pna, death, prolonged hospitalization and wishes to initiate a soft diet. SLP to sign off.

## 2019-02-14 NOTE — PLAN OF CARE
Problem: Patient Care Overview  Goal: Plan of Care Review  Outcome: Ongoing (interventions implemented as appropriate)   02/14/19 1427 02/14/19 8154   Coping/Psychosocial   Plan of Care Reviewed With patient --    Plan of Care Review   Progress --  improving   OTHER   Outcome Summary --  Bolus TF as needed. video swallow, patient refusing NPO diet after education and will be put on mech soft. Meds per order. D/C soon? wean o2. CTM      Goal: Individualization and Mutuality  Outcome: Ongoing (interventions implemented as appropriate)    Goal: Discharge Needs Assessment  Outcome: Ongoing (interventions implemented as appropriate)    Goal: Interprofessional Rounds/Family Conf  Outcome: Ongoing (interventions implemented as appropriate)      Problem: Fall Risk (Adult)  Goal: Identify Related Risk Factors and Signs and Symptoms  Outcome: Ongoing (interventions implemented as appropriate)    Goal: Absence of Fall  Outcome: Ongoing (interventions implemented as appropriate)      Problem: VTE, DVT and PE (Adult)  Goal: Signs and Symptoms of Listed Potential Problems Will be Absent, Minimized or Managed (VTE, DVT and PE)  Outcome: Ongoing (interventions implemented as appropriate)      Problem: Skin Injury Risk (Adult)  Goal: Skin Health and Integrity  Outcome: Ongoing (interventions implemented as appropriate)

## 2019-02-14 NOTE — PROGRESS NOTES
Name: Rosalio Rene ADMIT: 2019   : 1940  PCP: Erinn Howard MD    MRN: [id number] LOS: 2 days   AGE/SEX: 79 y.o. male  ROOM: [facility]   Subjective     Shortness of breath continues today but is better  On 2 L nasal cannula now, no oxygen at baseline  No chest pain or palpitations  No nausea or vomiting  Duplex showed right calf DVT  Objective   Vital Signs  Temp:  [97.7 °F (36.5 °C)-98.9 °F (37.2 °C)] 98.9 °F (37.2 °C)  Heart Rate:  [57-94] 64  Resp:  [18-24] 20  BP: (106-162)/(52-72) 162/72  SpO2:  [90 %-94 %] 94 %  on  Flow (L/min):  [2-3] 3;   Device (Oxygen Therapy): nasal cannula  Body mass index is 26.5 kg/m².    Physical Exam   Constitutional: He is oriented to person, place, and time. No distress.   HENT:   Head: Normocephalic and atraumatic.   Eyes: EOM are normal. Pupils are equal, round, and reactive to light. No scleral icterus.   Neck: No JVD present.   Cardiovascular: Normal rate and regular rhythm.   No murmur heard.  Pulmonary/Chest: Effort normal and breath sounds normal. No respiratory distress. He has no wheezes. He has no rales.   Abdominal: Soft. Bowel sounds are normal. He exhibits no distension. There is no tenderness. There is no guarding.   Left-sided PEG tube without erythema or warmth   Musculoskeletal: He exhibits edema (1+ bilaterally, chronic venous stasis changes).   Neurological: He is alert and oriented to person, place, and time. No cranial nerve deficit.   Very soft spoken   Skin: Skin is warm and dry. No rash noted. He is not diaphoretic. No erythema.   Psychiatric: His behavior is normal.       Results Review:       I reviewed the patient's new clinical results.  Results from last 7 days   Lab Units 19  0510 19  1824 19  0624   WBC 10*3/mm3 8.73 9.18 11.77*   HEMOGLOBIN g/dL 11.0* 12.2* 12.2*   PLATELETS 10*3/mm3 238 239 221     Results from last 7 days   Lab Units 19  0510 19  1824 19  0624   SODIUM mmol/L 139 140 139    POTASSIUM mmol/L 4.9 4.6 4.1   CHLORIDE mmol/L 98 98 98   CO2 mmol/L 36.0* 32.8* 29.6*   BUN mg/dL 18 31* 17   CREATININE mg/dL 0.65* 0.76 0.65*   GLUCOSE mg/dL 214* 102* 170*   Estimated Creatinine Clearance: 91.3 mL/min (A) (by C-G formula based on SCr of 0.65 mg/dL (L)).  Results from last 7 days   Lab Units 02/12/19  1824   ALBUMIN g/dL 3.30*   BILIRUBIN mg/dL 0.4   ALK PHOS U/L 40   AST (SGOT) U/L 12   ALT (SGPT) U/L 25     Results from last 7 days   Lab Units 02/14/19  0510 02/12/19  1824 02/08/19  0624   CALCIUM mg/dL 9.1 9.2 9.2   ALBUMIN g/dL  --  3.30*  --    MAGNESIUM mg/dL  --  1.8  --            carvedilol 3.125 mg Oral BID With Meals   castor oil-balsam peru  Topical Q12H   enoxaparin 1 mg/kg Subcutaneous Q12H   insulin lispro 0-9 Units Subcutaneous 4x Daily With Meals & Nightly   lisinopril 20 mg Oral Daily   montelukast 10 mg Oral Nightly   sodium chloride 3 mL Intravenous Q12H      Diet Soft Texture; Ground; Consistent Carbohydrate      Assessment/Plan      Active Hospital Problems    Diagnosis Date Noted   • Pulmonary embolism on right (CMS/[facility]) [I26.99] 02/12/2019   • COPD (chronic obstructive pulmonary disease) (CMS/[facility]) [J44.9] 02/03/2019   • Vocal cord paralysis [J38.00] 02/03/2019   • Decubitus ulcer of coccyx, unspecified pressure ulcer stage [L89.159] 02/03/2019   • Laryngeal cancer (CMS/[facility]) [C32.9] 01/29/2019   • Dysphagia [R13.10] 01/24/2019   • Diabetes mellitus type 2, uncontrolled (CMS/[facility]) [E11.65] 01/21/2016      Resolved Hospital Problems   No resolved problems to display.     Pulmonary embolism on right    -echo cancelled  -cardiology has seen, no right heart strain  -lovenox  -right calf DVT on duplex  -continue lovenox with change to eliquis on DC      Diabetes mellitus type 2, uncontrolled    -ssi  -goal A1c is higher in him due to recurrence of laryngeal cancer       Laryngeal cancer    -ENT to see still       COPD (chronic obstructive pulmonary disease)    -continue to home  inhaler and prn treatments       Vocal cord paralysis       Dysphagia  -peg tube with feeds  -npo per speech but pt wishes to eat  -he understands risks of aspiration  -will give him soft diet with thin liquids       Decubitus ulcer of coccyx, unspecified pressure ulcer stage     Fibrotic changes at the bases of the lungs likely from chronic aspiration and pneumonitis.  Patient also has a nodule concern for metastatic disease.  He will need outpatient PET scan.  We will have to coordinate that with ENT or oncology. Appreciate Pulmonology    Acute resp failure with hypoxia due to PE, with sats of 88% on Room air at rest today     He DOES NOT have chronic cellulitis.  No need for chronic antibiotics. He has chronic venous stasis    HTN- continue with coreg and lisinopril    DISPO: TBELLA Shipley MD  [facility]  02/14/19  3:42 PM

## 2019-02-14 NOTE — PROGRESS NOTES
Rudy Cook MD                          [phone]      Patient ID:    Name:  Rosalio Rene    MRN:  [id number]    1940   79 y.o.  male            Patient Care Team:  Erinn Howard MD as PCP - General (Internal Medicine)  Erinn Howard MD as PCP - Family Medicine  Nida Meyers MD as Consulting Physician (Radiation Oncology)  Rudy Cook MD as Consulting Physician (Pulmonary Disease)    CC/ Reason for visit: Per Assessment mentioned below    Subjective: Pt seen and examined this AM. No acute overnight events noted. Doing better.  Lying in the bed.  Does have some conversational dyspnea but otherwise is feeling better.    ROS: Denies any subjective fevers, chest pain, nausea/ vomiting    PMH/ PSH/ FH/SH reviewed and edited as needed    Objective     Vital Signs past 24hrs    BP range: BP: (106-162)/(52-72) 162/72  Pulse range: Heart Rate:  [57-94] 64  Resp rate range: Resp:  [18-24] 20  Temp range: Temp (24hrs), Av.5 °F (36.9 °C), Min:97.7 °F (36.5 °C), Max:98.9 °F (37.2 °C)      Ventilator/Non-Invasive Ventilation Settings (From admission, onward)    None          Device (Oxygen Therapy): nasal cannula       86.2 kg (190 lb); Body mass index is 26.5 kg/m².      Intake/Output Summary (Last 24 hours) at 2019 1816  Last data filed at 2019 1623  Gross per 24 hour   Intake 1610 ml   Output 300 ml   Net 1310 ml       Exam:  Constitutional: Elderly WM, No acute distress, + accessory muscle use  Head: - NCAT  Eyes: No pallor, Anicteric conjunctiva, EOMI.  ENMT:  Mallampati 3, no oral thrush. No palpable cervical lymphadenopathy, Hoarse.  Heart: RRR, no murmur  Lungs: RONI +, No wheezes. B/l mild basillar crackles heard    Abdomen: Soft. No tenderness, guarding or rigidity  Extremities: Extremities warm and well perfused, Chronic venous stasis. 1+ pitting edema  Neuro: Conscious, alert, oriented x3, no gross focal neuro deficits    Scheduled  meds:    carvedilol 3.125 mg Oral BID With Meals   castor oil-balsam peru  Topical Q12H   enoxaparin 1 mg/kg Subcutaneous Q12H   insulin lispro 0-9 Units Subcutaneous 4x Daily With Meals & Nightly   lisinopril 20 mg Oral Daily   montelukast 10 mg Oral Nightly   sodium chloride 3 mL Intravenous Q12H       IV meds:                           Data Review:      Results from last 7 days   Lab Units 02/14/19  0510 02/12/19  1824 02/08/19  0624   SODIUM mmol/L 139 140 139   POTASSIUM mmol/L 4.9 4.6 4.1   CHLORIDE mmol/L 98 98 98   CO2 mmol/L 36.0* 32.8* 29.6*   BUN mg/dL 18 31* 17   CREATININE mg/dL 0.65* 0.76 0.65*   CALCIUM mg/dL 9.1 9.2 9.2   BILIRUBIN mg/dL  --  0.4  --    ALK PHOS U/L  --  40  --    ALT (SGPT) U/L  --  25  --    AST (SGOT) U/L  --  12  --    GLUCOSE mg/dL 214* 102* 170*   WBC 10*3/mm3 8.73 9.18 11.77*   HEMOGLOBIN g/dL 11.0* 12.2* 12.2*   PLATELETS 10*3/mm3 238 239 221   PROBNP pg/mL  --  1,665.0  --        Lab Results   Component Value Date    CALCIUM 9.1 02/14/2019                    Results Review:    I have reviewed the relevant laboratory results and reviewed the chest imaging from the last 3 days personally and summarized it below    Assessment    Acute hypoxic respiratory failure  Acute right-sided pulmonary embolism  Vocal cord edema/paralysis  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  New left lower lobe lung nodule is certainly concerning with recent diagnosis of squamous cell cancer.  We will need outpatient PET scan.  We will have to coordinate that with ENT. Nodule might be too small for PET. Will f/u as o/p.   Interstitial lung changes from bilateral chronic aspiration.  Wean supplemental oxygen as tolerated  Would rec DC to rehab.      I have discussed my findings  and recommendations with patient.     Rudy Cook MD  2/14/2019

## 2019-02-14 NOTE — PLAN OF CARE
Problem: Patient Care Overview  Goal: Plan of Care Review  Outcome: Ongoing (interventions implemented as appropriate)   02/14/19 0710   Coping/Psychosocial   Plan of Care Reviewed With patient   Plan of Care Review   Progress improving   OTHER   Outcome Summary 3L O2 NC. Congested with a very weak cough. PRN breathing treatment given with mild relief. Low airloss mattress, venelex cream applied to wounds. Q2 turn. Bolus tube feedings given. Video swallow scheduled for today. will continue to monitor.

## 2019-02-14 NOTE — MBS/VFSS/FEES
Acute Care - Speech Language Pathology   Swallow Initial Evaluation & D/C [facility]     Patient Name: Rosalio Rnee  : 1940  MRN: [id number]  Today's Date: 2019  Onset of Illness/Injury or Date of Surgery: 19            Admit Date: 2019    Visit Dx:     ICD-10-CM ICD-9-CM   1. Pulmonary embolism on right (CMS/HCC) I26.99 415.19   2. Hypoxia R09.02 799.02   3. Dyspnea, unspecified type R06.00 786.09   4. History of treatment for malignancy Z85.9 V10.90   5. 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)   • Laryngeal cancer (CMS/HCC)   • Acute respiratory failure with hypercapnia (CMS/HCC)   • Dysphagia   • COPD (chronic obstructive pulmonary disease) (CMS/HCC)   • Hypertension   • Type 2 diabetes mellitus (CMS/HCC)   • Vocal cord paralysis   • Aspiration pneumonia of both lower lobes (healthcare associated)   • Acute respiratory failure with hypoxia and hypercapnia (CMS/HCC)   • Hypokalemia   • Decubitus ulcer of coccyx, unspecified pressure ulcer stage   • Pulmonary embolism on right (CMS/HCC)     Past Medical History:   Diagnosis Date   • Abdominal cramping     AT TIMES   • Anxiety    • Cellulitis     BILATERAL LOWER EXTREMITIES   • Chronic cough    • COPD (chronic obstructive pulmonary disease) (CMS/[facility])    • Dysphasia    • Edema, lower extremity    • Enlarged heart     PER PATIENT?? STATES SAW CARDIOLOGIST YRS AGO BUT DOES NOT REMEMBER NAME    • History of laryngeal cancer     HISTORY OF LARYNGECTOMY, RADIATION   • Hyperlipidemia    • Hypertension    • Hypogonadism male    • Type 2 diabetes mellitus (CMS/HCC)    • Vocal cord paralysis      Past Surgical History:  "  Procedure Laterality Date   • CHOLECYSTECTOMY     • ENDOSCOPY N/A 2/2/2019    Procedure: ESOPHAGOGASTRODUODENOSCOPY;  Surgeon: Twin Bobby MD;  Location: [facility] MAIN OR;  Service: Gastroenterology   • LARYNGOSCOPY N/A 1/29/2019    Procedure: DIRECT SUSPENSION MICROLARYNGOSCOPY BIOPSY AND POSSIBLE CERVICE ESOPHAGOSCOPY;  Surgeon: Michael Bhagat MD;  Location: [facility] MAIN OR;  Service: ENT   • PEG TUBE INSERTION N/A 2/2/2019    Procedure: PERCUTANEOUS ENDOSCOPIC GASTROSTOMY TUBE INSERTION;  Surgeon: Twin Bobby MD;  Location: [facility] MAIN OR;  Service: Gastroenterology   • PROSTATECTOMY  2004   • TOTAL LARYNGECTOMY  2005        SWALLOW EVALUATION (last 72 hours)      SLP Adult Swallow Evaluation     Row Name 02/14/19 0830 02/13/19 0830          Document Type  evaluation  -SH  evaluation  -SH    Subjective Information  no complaints  -SH  no complaints  -SH    Patient Observations  alert;cooperative  -SH  alert;cooperative  -SH    Patient Effort  good  -SH  excellent  -SH          Patient Profile Reviewed  yes  -SH  yes  -SH    Pertinent History Of Current Problem  Vocal cord paralysis/pulm emoblism. See initial case hx.   -SH  R pulm emoblism. Recent D/C from [facility] with asp pna. SLP was consulted, but patient later intubated and order was D/Davi. Pt reports being cleared to eat with supplemental tube feedings at D/C that admit. ENT assessment 1/29: \"FINDINGS:Both vocal cords appeared normal but paralyzed in the paramedian position.  He had a diffuse necrotic ulcerative foul smelling lesion involving the posterior surface of the arytenoids, post cricoid and medial aspect of the pyriform sinuses.  The cervical esophagus was normal.  Frozen section is positive for invasive squamous cell carcinoma.\".  -SH    Current Method of Nutrition  NPO  -SH  pureed;runny pureed;thin liquids;gastrostomy feedings  -SH    Precautions/Limitations, Hearing  WFL;for purposes of eval  -SH  WFL;for purposes of " eval  -    Prior Level of Function-Communication  other (see comments) dysphonia  -SH  other (see comments) dysphonia, voice disorder  -SH    Prior Level of Function-Swallowing  other (see comments) asp on esophagram  -  other (see comments) coughing with PO  -    Plans/Goals Discussed with  patient;agreed upon  -  patient;agreed upon  -    Barriers to Rehab  medically complex  -SH  medically complex  -    Patient's Goals for Discharge  patient did not state  -  patient did not state  -          Additional Documentation  Pain Scale: Numbers Pre/Post-Treatment (Group)  -  Pain Scale: Numbers Pre/Post-Treatment (Group)  -          Pain Scale: Numbers, Pretreatment  0/10 - no pain  -SH  0/10 - no pain  -SH    Pain Scale: Numbers, Post-Treatment  0/10 - no pain  -SH  0/10 - no pain  -SH          Dentition Assessment  natural, present and adequate  -  natural, present and adequate  -    Secretion Management  problems swallowing secretions  -  problems swallowing secretions  -    Mucosal Quality  moist, healthy  -  moist, healthy  -    Volitional Swallow  --  delayed;weak  -    Volitional Cough  --  weak  -SH          Vocal Impairment, Detail. Cranial Nerve X (Vagus)  --  impaired throat clear/cough (see comments);vocal quality abnormality (see comments)  -          Clinical Swallow Evaluation Summary  --  Bedside swallow completed. Of note, pt exhibited nirali aspiration on esophagram in December with cough. Pt admits to coughing with PO at home. OME revealed breathy voice with harsh quality. Cough fair, suspected false vocal cord recruitment. Intermittent wet voice with ice, difficult to assess 2/2 dysphonia. Hyolaryngeal elevation slightly reduced upon palpation. SLP recs NPO w/ tube feedings and to allow ice chips sparingly after oral care. . An instrumental assessment was also recommended following assessment. SLP provided education to patient on two choices for instrumental  assessment (FEES, MBSS). Pt is to decide if he wishes to proceed with SLP's recs to pursue further assessment of swallow function. Pt aware of the aspiration risks (death, asp pna, prolonged hospitalization) and teach back provided. Pt to discuss with family. RN notified.   -          VFSS Summary  Pt presents with severe oropharyngeal dysphagia characterized by reduced hyolaryngeal elevation/excursion, incomplete epiglottic deflection, incomplete opening upper esophageal sphincter, and suspected poor glottic closure. Spill to valleculae before the swallow with nectar via spoon with non transient posterior penetration during the swallow, which eventually lead to trace silent aspiration with subsequent swallows. Spill to the pyriforms before the swallow with thins via spoon with non transient penetration before and during the swallow, which eventually lead to trace silent aspiration (anterior and posterior) with subsequent swallows. Non transient penetration/aspiration during the swallow with honey via spoon. Aspiration amount increased with more viscous liquids/foods. Posterior penetration/aspiration after the swallow with puree d/t residue at pyriforms. Pt able to clear partially with cues.   -  --          Summary Statement  Pt presents with severe oropharyngeal dysphagia characterized by reduced hyolaryngeal elevation/excursion, incomplete epiglottic deflection, and incomplete opening upper esophageal sphincter. Penetration/aspiration across trials of thin, nectar, honey, and puree.   -  --          SLP Swallowing Diagnosis  severe;pharyngeal dysfunction  -  suspected pharyngeal dysfunction  -    Functional Impact  risk of aspiration/pneumonia  -  risk of aspiration/pneumonia  -    Rehab Potential/Prognosis, Swallowing  --  re-evaluate goals as necessary  -    Swallow Criteria for Skilled Therapeutic Interventions Met  patient/family declined skilled intervention at this time  -  demonstrates  skilled criteria  -          Therapy Frequency (Swallow)  evaluation only  -  PRN  -    Predicted Duration Therapy Intervention (Days)  --  until discharge  -    SLP Diet Recommendation  ice chips between meals after oral care, with supervision;other (see comments) pt refused  -  ice chips between meals after oral care, with supervision  -    Recommended Diagnostics  --  [place]ES  -    SLP Rec. for Method of Medication Administration  --  meds via alternate route  -    Monitor for Signs of Aspiration  --  yes;notify SLP if any concerns  -    Anticipated Dischage Disposition  --  anticipate therapy at next level of care  -          Oral Nutrition/Hydration Goal Selection (SLP)  oral nutrition/hydration, SLP goal 1  -  oral nutrition/hydration, SLP goal 1  -          Oral Nutrition/Hydration Goal 1, SLP  Pt will tolerate free water protocol with ice chips.  -  Pt will tolerate free water protocol with ice chips.  -    Time Frame (Oral Nutrition/Hydration Goal 1, SLP)  other (see comments) pt refused tx at this time  -  by discharge  -      User Key  (r) = Recorded By, (t) = Taken By, (c) = Cosigned By    Initials Name Effective Dates     Linda Low, MS CCC-SLP 03/07/18 -           EDUCATION  The patient has been educated in the following areas:   Dysphagia (Swallowing Impairment).    SLP Recommendation and Plan  SLP Swallowing Diagnosis: severe, pharyngeal dysfunction  SLP Diet Recommendation: ice chips between meals after oral care, with supervision, other (see comments)(pt refused)              Swallow Criteria for Skilled Therapeutic Interventions Met: patient/family declined skilled intervention at this time        Therapy Frequency (Swallow): evaluation only          Plan of Care Reviewed With: patient  Plan of Care Review  Plan of Care Reviewed With: patient  Progress: no change  Outcome Summary: VFSS completed. Pt exhibited penetration/aspiration across consistencies.  SLP recs continuing NPO with free water protocol. SLP reviewed images in detail with patient and handout was provided. Pt is politely refusing NPO recs and therapy at this time. Pt voiced understanding of aspiration risks; pna, death, prolonged hospitalization.     SLP GOALS     Row Name 02/14/19 0830 02/13/19 0830          Oral Nutrition/Hydration Goal 1 (SLP)    Oral Nutrition/Hydration Goal 1, SLP  Pt will tolerate free water protocol with ice chips.  -  Pt will tolerate free water protocol with ice chips.  -     Time Frame (Oral Nutrition/Hydration Goal 1, SLP)  other (see comments) pt refused tx at this time  -  by discharge  -       User Key  (r) = Recorded By, (t) = Taken By, (c) = Cosigned By    Initials Name Provider Type    Linda Ayers MS CCC-SLP Speech and Language Pathologist           SLP Outcome Measures (last 72 hours)      SLP Outcome Measures     Row Name 02/14/19 1300 02/13/19 0900          SLP Outcome Measures    Outcome Measure Used?  Adult NOMS  -  Adult NOMS  -        Adult FCM Scores    FCM Chosen  Swallowing  -  Swallowing  -     Swallowing FCM Score  1  -  1  -       User Key  (r) = Recorded By, (t) = Taken By, (c) = Cosigned By    Initials Name Effective Dates     Linda Low MS CCC-SLP 03/07/18 -            Time Calculation:   Time Calculation- SLP     Row Name 02/14/19 1337             Time Calculation- [facility]    SLP Start Time  0830  -      SLP Received On  02/14/19  -        User Key  (r) = Recorded By, (t) = Taken By, (c) = Cosigned By    Initials Name Provider Type     Linda Low MS CCC-SLP Speech and Language Pathologist          Therapy Charges for Today     Code Description Service Date Service Provider Modifiers Qty    79122193541 HC ST EVAL ORAL PHARYNG SWALLOW 4 2/13/2019 Linda Low MS CCC-SLP GN 1    19100012843 HC ST MOTION FLUORO EVAL SWALLOW 4 2/14/2019 Linda Low MS CCC-SLP GN 1               Linda Low MS  CCC-SLP  2/14/2019

## 2019-02-14 NOTE — DISCHARGE PLACEMENT REQUEST
"Rosalio Rene (79 y.o. Male)     Date of Birth Social Security Number Address Home Phone MRN    1940  [id number] [address] [phone] [id number]    [employer] Marital Status          Latter day Single       Admission Date Admission Type Admitting Provider Attending Provider Department, Room/Bed    2/12/19 Emergency EdlingAlo MD Hogancamp, David Ryan, MD [address], [address]    Discharge Date Discharge Disposition Discharge Destination                       Attending Provider:  Chema Shipley MD    Allergies:  No Known Allergies    Isolation:  None   Infection:  None   Code Status:  CPR    Ht:  180.3 cm (71\")   Wt:  86.2 kg (190 lb)    Admission Cmt:  None   Principal Problem:  None                Active Insurance as of 2/12/2019     Primary Coverage     Payor Plan Insurance Group Employer/Plan Group    ANTHEM MEDICARE REPLACEMENT ANTHEM MEDICARE ADVANTAGE [id number]     Payor Plan Address Payor Plan Phone Number Payor Plan Fax Number Effective Dates    [address] [phone]  6/1/2016 - None Entered    [place], [place] [address]       Subscriber Name Subscriber Birth Date Member ID       ROSALIO RENE 1940 [id number]                 Emergency Contacts      (Rel.) Home Phone Work Phone Mobile Phone    Alan Hook (Care Giver) [phone] -- --    Benjamin Herman (Friend) [phone] -- [phone]            "

## 2019-02-15 NOTE — PROGRESS NOTES
Continued Stay Note  [facility]     Patient Name: Rosalio Rene  MRN: [id number]  Today's Date: 2/15/2019    Admit Date: 2/12/2019    Discharge Plan     Row Name 02/15/19 1240       Plan    Plan  [facility] pre-cert obtained bed available    Patient/Family in Agreement with Plan  yes    Plan Comments  Spoke with [facility], precert obtained from [facility] and bed available. Spoke with pt at bedside, discussed transportation options. Pt requested I notify his nephew Alan. Spoke with Charo/Hospitalist coordinator for Dr. Shipley and RN Irma. Spoke with pts nephew Alan [phone] via outbound call, discussed transportation options and provided ambulance disclaimer, he states probable ambulance but can discuss at day of dc. Packet in chart adalid. [place]/CCP        Discharge Codes    No documentation.             Shelbie Lowry RN

## 2019-02-15 NOTE — NURSING NOTE
Spoke with Dr Sal JENKINS states that he knows patient very well and has nothing new to add. MD has reviewed notes and is aware of PE. Patient should follow up with Oncology at discharge. E.King MI

## 2019-02-15 NOTE — PLAN OF CARE
Problem: Patient Care Overview  Goal: Plan of Care Review  Outcome: Ongoing (interventions implemented as appropriate)   02/15/19 8348   Coping/Psychosocial   Plan of Care Reviewed With patient   Plan of Care Review   Progress improving   OTHER   Outcome Summary Pt tolerated treatment well this date. Ambulated 100ft w/ Rw and CGA. Pt had c/o pain in lower L abdomen, though no worse w/ activity. PT will continue to address functional mobility deficits as tolerated.

## 2019-02-15 NOTE — PROGRESS NOTES
Rudy Cook MD                          [phone]      Patient ID:    Name:  Rosalio Rene    MRN:  [id number]    1940   79 y.o.  male            Patient Care Team:  Erinn Howard MD as PCP - General (Internal Medicine)  Erinn Howard MD as PCP - Family Medicine  Nida Meyers MD as Consulting Physician (Radiation Oncology)  Rudy Cook MD as Consulting Physician (Pulmonary Disease)    CC/ Reason for visit: Per Assessment mentioned below    Subjective: Pt seen and examined this AM. No acute overnight events noted. Doing better.  Lying in the bed.  Does have some conversational dyspnea but otherwise is feeling better.    ROS: Denies any subjective fevers, chest pain, nausea/ vomiting    PMH/ PSH/ FH/SH reviewed and edited as needed    Objective     Vital Signs past 24hrs    BP range: BP: (122-138)/(55-72) 133/72  Pulse range: Heart Rate:  [64-93] 79  Resp rate range: Resp:  [20-22] 20  Temp range: Temp (24hrs), Av.7 °F (37.1 °C), Min:98.3 °F (36.8 °C), Max:99 °F (37.2 °C)      Ventilator/Non-Invasive Ventilation Settings (From admission, onward)    None          Device (Oxygen Therapy): nasal cannula       86.2 kg (190 lb); Body mass index is 26.5 kg/m².      Intake/Output Summary (Last 24 hours) at 2/15/2019 1725  Last data filed at 2/15/2019 1400  Gross per 24 hour   Intake 1335 ml   Output 600 ml   Net 735 ml       Exam:  Constitutional: Elderly WM, No acute distress, + accessory muscle use  Head: - NCAT  Eyes: No pallor, Anicteric conjunctiva, EOMI.  ENMT:  Mallampati 3, no oral thrush. No palpable cervical lymphadenopathy, Hoarse.  Heart: RRR, no murmur  Lungs: RONI +, No wheezes. B/l mild basillar crackles heard    Abdomen: Soft. No tenderness, guarding or rigidity  Extremities: Extremities warm and well perfused, Chronic venous stasis. 1+ pitting edema  Neuro: Conscious, alert, oriented x3, no gross focal neuro deficits    Scheduled  meds:      carvedilol 3.125 mg Oral BID With Meals   castor oil-balsam peru  Topical Q12H   enoxaparin 1 mg/kg Subcutaneous Q12H   insulin lispro 0-9 Units Subcutaneous 4x Daily With Meals & Nightly   lisinopril 20 mg Oral Daily   montelukast 10 mg Oral Nightly   sodium chloride 3 mL Intravenous Q12H       IV meds:                           Data Review:      Results from last 7 days   Lab Units 02/15/19  0334 02/14/19  0510 02/12/19  1824   SODIUM mmol/L 141 139 140   POTASSIUM mmol/L 4.7 4.9 4.6   CHLORIDE mmol/L 97* 98 98   CO2 mmol/L 37.8* 36.0* 32.8*   BUN mg/dL 21 18 31*   CREATININE mg/dL 0.67* 0.65* 0.76   CALCIUM mg/dL 9.0 9.1 9.2   BILIRUBIN mg/dL  --   --  0.4   ALK PHOS U/L  --   --  40   ALT (SGPT) U/L  --   --  25   AST (SGOT) U/L  --   --  12   GLUCOSE mg/dL 199* 214* 102*   WBC 10*3/mm3 9.22 8.73 9.18   HEMOGLOBIN g/dL 10.8* 11.0* 12.2*   PLATELETS 10*3/mm3 207 238 239   PROBNP pg/mL  --   --  1,665.0       Lab Results   Component Value Date    CALCIUM 9.0 02/15/2019                    Results Review:    I have reviewed the relevant laboratory results and reviewed the chest imaging from the last 3 days personally and summarized it below    Assessment    Acute hypoxic respiratory failure  Acute right-sided pulmonary embolism  Vocal cord edema/paralysis  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  New left lower lobe lung nodule is certainly concerning with recent diagnosis of squamous cell cancer.  We will need outpatient PET scan.  We will have to coordinate that with ENT. Nodule might be too small for PET. Will f/u as o/p.   Interstitial lung changes from bilateral chronic aspiration.  Wean supplemental oxygen as tolerated  Ok to DC to rehab.      I have discussed my findings and  recommendations with patient.     Rudy Cook MD  2/15/2019

## 2019-02-15 NOTE — DISCHARGE SUMMARY
Name: Rosalio Rene  Age: 79 y.o.  Sex: male  :  1940  MRN: [id number]         Primary Care Physician: Erinn Howard MD      Date of Admission:  2019  Date of Discharge:  2/15/2019      Chief Complaint:  Leg Swelling and Shortness of Breath      Discharge Diagnosis:  Active Hospital Problems    Diagnosis Date Noted   • **Pulmonary embolism on right (CMS/HCC) [I26.99] 2019   • COPD (chronic obstructive pulmonary disease) (CMS/[facility], located within [facility]) [J44.9] 2019   • Vocal cord paralysis [J38.00] 2019   • Decubitus ulcer of coccyx, unspecified pressure ulcer stage [L89.159] 2019   • Laryngeal cancer (CMS/HCC) [C32.9] 2019   • Dysphagia [R13.10] 2019   • Diabetes mellitus type 2, uncontrolled (CMS/HCC) [E11.65] 2016      Resolved Hospital Problems   No resolved problems to display.       Secondary Diagnoses:  Past Medical History:   Diagnosis Date   • Abdominal cramping     AT TIMES   • Anxiety    • Cellulitis     BILATERAL LOWER EXTREMITIES   • Chronic cough    • COPD (chronic obstructive pulmonary disease) (CMS/[facility], located within [facility])    • Dysphasia    • Edema, lower extremity    • Enlarged heart     PER PATIENT?? STATES SAW CARDIOLOGIST YRS AGO BUT DOES NOT REMEMBER NAME    • History of laryngeal cancer     HISTORY OF LARYNGECTOMY, RADIATION   • Hyperlipidemia    • Hypertension    • Hypogonadism male    • Type 2 diabetes mellitus (CMS/[facility], located [facility])    • Vocal cord paralysis          Consults:  Consult Orders (all) (From admission, onward)    Start     Ordered    02/15/19 1116  Inpatient General Surgery Consult  Once     Specialty:  General Surgery  Provider:  Twin Bobby MD    02/15/19 1115    19 0209  Inpatient Pulmonology Consult  Once     Specialty:  Pulmonary Disease  Provider:  Hussein Dubon MD    19 0209    19 0025  Inpatient ENT Consult  Once     Specialty:  Otolaryngology  Provider:  Michael Bhagat MD    19 0025    19 0024  Inpatient  Cardiology Consult  Once     Specialty:  Cardiology  Provider:  Leo Kerns MD    02/13/19 0024          Procedures Performed:  Ct Angiogram Chest With Contrast    Result Date: 2/12/2019   1. Study is positive for acute pulmonary thromboembolus and pulmonary arterial branches to the right upper lobe and right lower lobe. While this is nonocclusive thrombus, the patient does have an abnormal RV LV ratio, which may reflect right heart strain. 2. Areas of consolidation noted at the lung bases bilaterally. Similar findings were present on prior exam from July 2018. These may reflect some fibrotic changes, but the possibility of superimposed infiltrate is not excluded. Patient is also noted to have some enlarged hilar nodes. These may be reactive, but attention to them on short-term follow-up suggested. Furthermore, the patient has a left lower lobe pulmonary nodule, which is increased in size compared to prior exam from July 2018. Metastatic disease cannot be excluded, and correlation with PET is recommended on a nonemergent outpatient basis.  Findings were called to Dr. Israel in the emergency room at 9:18 PM.  Radiation dose reduction techniques were utilized, including automated exposure control and exposure modulation based on body size.  This report was finalized on 2/12/2019 9:20 PM by Dr. Nadia Baez M.D.        Hospital Course:    The patient is a very pleasant 79-year-old male history of type 2 diabetes, hypertension, COPD and hyperlipidemia who was recently diagnosed with a recurrence of laryngeal cancer.  He presented to the hospital for shortness of breath.  He was diagnosed with right-sided pulmonary embolism via CT scan noticed given Lovenox.  There is question of right heart strain and he was admitted to our service for this and acute respiratory failure with hypoxia related to the pulmonary embolism.  He was seen by cardiology and did not feel like he had any right heart strain and they signed off.   He was seen by pulmonology as well and he has does have a new left lung nodule that needs to be followed up on by oncology.  He was transitioned from Lovenox to Eliquis without any difficulty.  The day of discharge he developed some oozing around his recently placed PEG tube and have asked general surgery to help evaluate him prior to discharge.  Otherwise he is medically stable to be discharged to rehab today.  Of note, he has been on antibiotics with ciprofloxacin and amoxicillin for bilateral lower extremity cellulitis chronically for some time.  He has alternated these.  He does not have any signs or symptoms of cellulitis and his antibiotics have been stopped.  He has chronic venous stasis but no signs of cellulitis.    Tube feeding instructions: Osmolite 1.5, bolus feeding 360 mL.  60 mL water flush with every bolus    Physical Exam:  Temp:  [98.3 °F (36.8 °C)-99 °F (37.2 °C)] 98.3 °F (36.8 °C)  Heart Rate:  [64-93] 93  Resp:  [20-22] 20  BP: (122-138)/(55-72) 133/72  Body mass index is 26.5 kg/m².  Physical Exam  Constitutional: He is oriented to person, place, and time. No distress.   HENT:   Head: Normocephalic and atraumatic.   Eyes: EOM are normal. Pupils are equal, round, and reactive to light. No scleral icterus.   Neck: No JVD present.   Cardiovascular: Normal rate and regular rhythm.   No murmur heard.  Pulmonary/Chest: Effort normal and breath sounds normal. No respiratory distress. He has no wheezes. He has no rales.   Abdominal: Soft. Bowel sounds are normal. He exhibits no distension. There is no tenderness. There is no guarding.   Left-sided PEG tube with some surrounding oozing Musculoskeletal: He exhibits edema (1+ bilaterally, chronic venous stasis changes).   Neurological: He is alert and oriented to person, place, and time. No cranial nerve deficit.   Very soft spoken   Skin: Skin is warm and dry. No rash noted. He is not diaphoretic. No erythema.   Psychiatric: His behavior is normal.      Condition on Discharge:  Stable.    Discharge Disposition:   [facility]    Allergies:   No Known Allergies    Discharge Medications:      Discharge Medications      New Medications      Instructions Start Date   apixaban 5 MG tablet tablet  Commonly known as:  ELIQUIS   5 mg, Oral, Every 12 Hours Scheduled      castor oil-balsam peru ointment   1 each, Topical, Every 12 Hours Scheduled         Changes to Medications      Instructions Start Date   glimepiride 4 MG tablet  Commonly known as:  AMARYL  What changed:  Another medication with the same name was removed. Continue taking this medication, and follow the directions you see here.   4 mg, Oral, Every Morning Before Breakfast         Continue These Medications      Instructions Start Date   albuterol sulfate  (90 Base) MCG/ACT inhaler  Commonly known as:  PROVENTIL HFA;VENTOLIN HFA;PROAIR HFA   2 puffs, Inhalation, Every 4 Hours PRN      ANORO ELLIPTA 62.5-25 MCG/INH aerosol powder  inhaler  Generic drug:  umeclidinium-vilanterol   1 puff, Inhalation, Daily - RT      carvedilol 3.125 MG tablet  Commonly known as:  COREG   3.125 mg, Oral, 2 Times Daily With Meals      clotrimazole-betamethasone 1-0.05 % cream  Commonly known as:  LOTRISONE   Topical, 2 Times Daily      diphenoxylate-atropine 2.5-0.025 MG per tablet  Commonly known as:  LOMOTIL   1 tablet, Oral, 4 Times Daily PRN      econazole nitrate 1 % cream  Commonly known as:  SPECTAZOLE   Topical      fluticasone 50 MCG/ACT nasal spray  Commonly known as:  FLONASE   2 sprays, Nasal, 2 Times Daily      ipratropium-albuterol 0.5-2.5 mg/3 ml nebulizer  Commonly known as:  DUO-NEB   3 mL, Nebulization, Every 4 Hours PRN      lidocaine-prilocaine 2.5-2.5 % cream  Commonly known as:  EMLA   Topical, As Needed      lisinopril 20 MG tablet  Commonly known as:  PRINIVIL,ZESTRIL   20 mg, Oral, Daily      metFORMIN 1000 MG tablet  Commonly known as:  GLUCOPHAGE   1,000 mg, Oral, 2 Times Daily       montelukast 10 MG tablet  Commonly known as:  SINGULAIR   10 mg, Oral, Nightly         Stop These Medications    amoxicillin 500 MG capsule  Commonly known as:  AMOXIL     aspirin 81 MG tablet     ciprofloxacin 500 MG tablet  Commonly known as:  CIPRO     JANUVIA 50 MG tablet  Generic drug:  SITagliptin            Discharge Diet:   Diet Instructions     Advance Diet As Tolerated      Continue current tube feeding regimen          Activity at Discharge:   Activity Instructions     Activity as Tolerated            Follow-up Appointments:  No future appointments.  Additional Instructions for the Follow-ups that You Need to Schedule     Call MD With Problems / Concerns   As directed         Contact information for follow-up providers     Erinn Howard MD .    Specialty:  Internal Medicine  Contact information:  [address]  [place] [address]  [phone]                   Contact information for after-discharge care     Destination     [facility] .    Service:  Skilled Nursing  Contact information:  [address] [address]  [phone]                           Additional Instructions for the Follow-ups that You Need to Schedule     Call MD With Problems / Concerns   As directed            Test Results Pending at Discharge:  None     Code status:   Code Status and Medical Interventions:   Ordered at: 02/13/19 0019     Code Status:    CPR     Medical Interventions (Level of Support Prior to Arrest):    Full         Chema Shipley MD  [facility]  02/15/19  2:55 PM      Time: greater than 30 minutes.

## 2019-02-15 NOTE — PLAN OF CARE
Problem: Patient Care Overview  Goal: Plan of Care Review  Outcome: Ongoing (interventions implemented as appropriate)   02/15/19 7642   Coping/Psychosocial   Plan of Care Reviewed With patient   Plan of Care Review   Progress improving   OTHER   Outcome Summary plan for d/c today after surgery sees pt for bleeding around gtube. LUQ pain has lessened. up w/PT. tried to wean oxygen off, desats to 88%--placed back on O2. skin care completed. VSS--AAMNDA Gurrola RN     Goal: Individualization and Mutuality  Outcome: Ongoing (interventions implemented as appropriate)    Goal: Discharge Needs Assessment  Outcome: Ongoing (interventions implemented as appropriate)    Goal: Interprofessional Rounds/Family Conf  Outcome: Ongoing (interventions implemented as appropriate)      Problem: Fall Risk (Adult)  Goal: Identify Related Risk Factors and Signs and Symptoms  Outcome: Ongoing (interventions implemented as appropriate)    Goal: Absence of Fall  Outcome: Ongoing (interventions implemented as appropriate)      Problem: VTE, DVT and PE (Adult)  Goal: Signs and Symptoms of Listed Potential Problems Will be Absent, Minimized or Managed (VTE, DVT and PE)  Outcome: Ongoing (interventions implemented as appropriate)      Problem: Skin Injury Risk (Adult)  Goal: Skin Health and Integrity  Outcome: Ongoing (interventions implemented as appropriate)

## 2019-02-15 NOTE — PLAN OF CARE
Problem: Patient Care Overview  Goal: Plan of Care Review  Outcome: Ongoing (interventions implemented as appropriate)   02/14/19 1706 02/15/19 0058 02/15/19 0437   Coping/Psychosocial   Plan of Care Reviewed With --  patient --    Plan of Care Review   Progress improving --  --    OTHER   Outcome Summary --  --  no falls. vital signs stable. 2L NC. HS bolus TF given.q2 turn. will continue to monitor.        Problem: Fall Risk (Adult)  Goal: Absence of Fall  Outcome: Ongoing (interventions implemented as appropriate)      Problem: VTE, DVT and PE (Adult)  Goal: Signs and Symptoms of Listed Potential Problems Will be Absent, Minimized or Managed (VTE, DVT and PE)  Outcome: Ongoing (interventions implemented as appropriate)      Problem: Skin Injury Risk (Adult)  Goal: Skin Health and Integrity  Outcome: Ongoing (interventions implemented as appropriate)

## 2019-02-15 NOTE — THERAPY TREATMENT NOTE
Acute Care - Physical Therapy Treatment Note  [facility]     Patient Name: Rosalio Rene  : 1940  MRN: [id number]  Today's Date: 2/15/2019  Onset of Illness/Injury or Date of Surgery: 19          Admit Date: 2019    Visit Dx:    ICD-10-CM ICD-9-CM   1. Pulmonary embolism on right (CMS/HCC) I26.99 415.19   2. Hypoxia R09.02 799.02   3. Dyspnea, unspecified type R06.00 786.09   4. History of treatment for malignancy Z85.9 V10.90   5. Generalized weakness R53.1 780.79     Patient Active Problem List   Diagnosis   • Diabetes mellitus type 2, uncontrolled (CMS/HCC)   • Hypogonadism in male   • BP (high blood pressure)   • HLD (hyperlipidemia)   • Abnormal weight gain   • Diabetes mellitus type 2, uncontrolled, with complications (CMS/[facility])   • Type 2 diabetes mellitus with diabetic nephropathy (CMS/[facility])   • Laryngeal cancer (CMS/[facility])   • Acute respiratory failure with hypercapnia (CMS/[facility])   • Dysphagia   • COPD (chronic obstructive pulmonary disease) (CMS/[facility])   • Hypertension   • Type 2 diabetes mellitus (CMS/HCC)   • Vocal cord paralysis   • Aspiration pneumonia of both lower lobes (healthcare associated)   • Acute respiratory failure with hypoxia and hypercapnia (CMS/[facility])   • Hypokalemia   • Decubitus ulcer of coccyx, unspecified pressure ulcer stage   • Pulmonary embolism on right (CMS/HCC)       Therapy Treatment    Rehabilitation Treatment Summary     Row Name 02/15/19 1040             Treatment Time/Intention    Discipline  physical therapy assistant  -SM      Document Type  therapy note (daily note)  -SM      Subjective Information  no complaints  -SM      Mode of Treatment  physical therapy  -SM      Patient Effort  good  -SM      Existing Precautions/Restrictions  fall;oxygen therapy device and L/min  -SM      Recorded by [SM] Linda Zepeda, PTA 02/15/19 1317      Row Name 02/15/19 1040             Cognitive Assessment/Intervention    Additional Documentation  Cognitive  Assessment/Intervention (Group)  -SM      Recorded by [SM] Linda Zepeda, \[facility] [place] [facility]\"" 02/15/19 1317      Row Name 02/15/19 1040             Cognitive Assessment/Intervention- PT/OT    Orientation Status (Cognition)  oriented x 4  -SM      Follows Commands (Cognition)  WNL  -SM      Personal Safety Interventions  fall prevention program maintained;gait belt;nonskid shoes/slippers when out of bed  -SM      Recorded by [SM] Linda Zepeda, \[facility] [place] [facility]\"" 02/15/19 1317      Row Name 02/15/19 1040             Bed Mobility Assessment/Treatment    Bed Mobility Assessment/Treatment  supine-sit;sit-supine  -SM      Supine-Sit [place] (Bed Mobility)  contact guard  -SM      Sit-Supine [place] (Bed Mobility)  contact guard  -      Assistive Device (Bed Mobility)  bed rails;head of bed elevated  -SM      Recorded by [] Linda Zepeda, \[facility] [place] [facility]\"" 02/15/19 1317      Row Name 02/15/19 1040             Transfer Assessment/Treatment    Transfer Assessment/Treatment  sit-stand transfer;stand-sit transfer  -SM      Recorded by [] Linda Zepeda, \[facility] [place] [facility]\"" 02/15/19 1317      Row Name 02/15/19 1040             Sit-Stand Transfer    Sit-Stand [place] (Transfers)  minimum assist (75% patient effort)  -      Assistive Device (Sit-Stand Transfers)  walker, front-wheeled  -      Recorded by [SM] Linda Zepeda, \[facility] [place] [facility]\"" 02/15/19 1317      Row Name 02/15/19 1040             Stand-Sit Transfer    Stand-Sit [place] (Transfers)  contact guard  -      Assistive Device (Stand-Sit Transfers)  walker, front-wheeled  -      Recorded by [SM] Linda Zepeda, \[facility] [place] [facility]\"" 02/15/19 1317      Row Name 02/15/19 1040             Gait/Stairs Assessment/Training    [place] Level (Gait)  contact guard;1 person to manage equipment  -      Assistive Device (Gait)  walker, front-wheeled  -      Distance in Feet (Gait)  100  -SM      Pattern (Gait)  step-through  -SM      Deviations/Abnormal Patterns (Gait)  april decreased;stride  length decreased  -SM      Bilateral Gait Deviations  forward flexed posture  -SM      Recorded by [] Linda Zepeda, KATTY 02/15/19 1317      Row Name 02/15/19 1040             Positioning and Restraints    Pre-Treatment Position  in bed  -SM      Post Treatment Position  bed  -SM      In Bed  supine;call light within reach;encouraged to call for assist;exit alarm on;with nsg  -SM      Recorded by [] Linda Zepeda, KATTY 02/15/19 1317      Row Name 02/15/19 1040             Pain Assessment    Additional Documentation  Pain Scale: Numbers Pre/Post-Treatment (Group)  -SM      Recorded by [] Linda Zepeda PTA 02/15/19 1317      Row Name 02/15/19 1040             Pain Scale: Numbers Pre/Post-Treatment    Pain Scale: Numbers, Pretreatment  0/10 - no pain  -SM      Pain Scale: Numbers, Post-Treatment  0/10 - no pain  -SM      Recorded by [] Linda Zepeda, KATTY 02/15/19 1317      Row Name                Wound 01/30/19 0800 Bilateral gluteal pressure injury    Wound - Properties Group Date first assessed: 01/30/19 [MS] Time first assessed: 0800 [MS] Present On Admission : yes [MS] Side: Bilateral [MS] Location: gluteal [MS] Type: pressure injury [MS] Stage, Pressure Injury: deep tissue injury [MS] Recorded by:  [MS] Arina Kirk, RN 01/30/19 1525      User Key  (r) = Recorded By, (t) = Taken By, (c) = Cosigned By    Initials Name Effective Dates Discipline     Linda Zepeda PTA 03/07/18 -  PT    Arina Hill, RN 06/16/16 -  Nurse          Wound 01/30/19 0800 Bilateral gluteal pressure injury (Active)   Dressing Appearance open to air 2/15/2019  8:40 AM   Base purple 2/15/2019  8:40 AM   Periwound blanchable 2/15/2019  8:40 AM   Care, Wound other (see comments) 2/15/2019  8:40 AM           Physical Therapy Education     Title: PT OT SLP Therapies (Done)     Topic: Physical Therapy (Done)     Point: Mobility training (Done)     Learning Progress Summary           Patient Acceptance, E,  VU,NR by  at 2/15/2019  1:17 PM    Acceptance, E, NR by  at 2/14/2019 10:29 AM                   Point: Home exercise program (Done)     Learning Progress Summary           Patient Acceptance, E, VU,NR by  at 2/15/2019  1:17 PM                   Point: Body mechanics (Done)     Learning Progress Summary           Patient Acceptance, E, VU,NR by  at 2/15/2019  1:17 PM                   Point: Precautions (Done)     Learning Progress Summary           Patient Acceptance, E, VU,NR by  at 2/15/2019  1:17 PM                               User Key     Initials Effective Dates Name Provider Type Discipline     04/03/18 -  Stacia Mendez, PT Physical Therapist PT     03/07/18 -  Linda Zepeda, PTA Physical Therapy Assistant PT                PT Recommendation and Plan     Plan of Care Reviewed With: patient  Progress: improving  Outcome Summary: Pt tolerated treatment well this date. Ambulated 100ft w/ Rw and CGA. Pt had c/o pain in lower L abdomen, though no worse w/ activity. PT will continue to address functional mobility deficits as tolerated.  Outcome Measures     Row Name 02/15/19 1300 02/14/19 1000          How much help from another person do you currently need...    Turning from your back to your side while in flat bed without using bedrails?  3  -  3  -EM     Moving from lying on back to sitting on the side of a flat bed without bedrails?  3  -  3  -EM     Moving to and from a bed to a chair (including a wheelchair)?  3  -  3  -EM     Standing up from a chair using your arms (e.g., wheelchair, bedside chair)?  3  -  3  -EM     Climbing 3-5 steps with a railing?  2  -  2  -EM     To walk in hospital room?  3  -SM  3  -EM     AM-PAC 6 Clicks Score  17  -  17  -EM        Functional Assessment    Outcome Measure Options  AM-PAC 6 Clicks Basic Mobility (PT)  -  AM-PAC 6 Clicks Basic Mobility (PT)  -EM       User Key  (r) = Recorded By, (t) = Taken By, (c) = Cosigned By     Initials Name Provider Type    Stacia Holt, PT Physical Therapist    Linda Garcia, KATTY Physical Therapy Assistant         Time Calculation:   PT Charges     Row Name 02/15/19 1319             Time Calculation    Start Time  1040  -      Stop Time  1055  -      Time Calculation (min)  15 min  -      PT Received On  02/15/19  -      PT - Next Appointment  02/16/19  -        User Key  (r) = Recorded By, (t) = Taken By, (c) = Cosigned By    Initials Name Provider Type    Linda Garcia PTA Physical Therapy Assistant        Therapy Suggested Charges     Code   Minutes Charges    None           Therapy Charges for Today     Code Description Service Date Service Provider Modifiers Qty    88464052882 HC PT THER PROC EA 15 MIN 2/15/2019 Linda Zepeda PTA GP 1    87788875557 HC PT THER SUPP EA 15 MIN 2/15/2019 Linda Zepeda PTA GP 1          PT G-Codes  Outcome Measure Options: AM-PAC 6 Clicks Basic Mobility (PT)  AM-PAC 6 Clicks Score: 17    Linda Zepeda PTA  2/15/2019

## 2019-02-15 NOTE — PROGRESS NOTES
Continued Stay Note  [facility]     Patient Name: Rosalio Rene  MRN: [id number]  Today's Date: 2/15/2019    Admit Date: 2/12/2019    Discharge Plan     Row Name 02/15/19 1552       Plan    Plan  [facility] precert obtained- await medical clearance from Dr. Bobby    Patient/Family in Agreement with Plan  yes    Plan Comments  Spoke with Irma MI, Dr. Bobby to see pt prior to dc. CCP explained pt requests ambulance transfer at dc. Also [facility] has ordered pts [facility] but may not arrive till Monday, so Nursing will need to send supplies with pt at dc. Irma verbalized understanding. Packet in chart adalid. Judson MI/CCP        Discharge Codes    No documentation.       Expected Discharge Date and Time     Expected Discharge Date Expected Discharge Time    Feb 15, 2019             Shelbie Lowry RN

## 2019-02-15 NOTE — ACP (ADVANCE CARE PLANNING)
"I was requested to see patient regarding \"Informed decisions\" form.  Patient stated he already has an Advance Directive and it is all taken care of.    "

## 2019-02-16 NOTE — PLAN OF CARE
Problem: Patient Care Overview  Goal: Plan of Care Review  Outcome: Ongoing (interventions implemented as appropriate)   02/16/19 0625   Coping/Psychosocial   Plan of Care Reviewed With patient   Plan of Care Review   Progress improving   OTHER   Outcome Summary Pt was alert & oriented x4, peg bolus feeding as ordered & tolerated well, awaiting Dr Bobby to see for persistent bleeding around Peg tube prior discharge, vss, no distress noticed, I will continue to monitor.     Goal: Individualization and Mutuality  Outcome: Ongoing (interventions implemented as appropriate)    Goal: Discharge Needs Assessment  Outcome: Ongoing (interventions implemented as appropriate)    Goal: Interprofessional Rounds/Family Conf  Outcome: Ongoing (interventions implemented as appropriate)      Problem: Fall Risk (Adult)  Goal: Identify Related Risk Factors and Signs and Symptoms  Outcome: Ongoing (interventions implemented as appropriate)    Goal: Absence of Fall  Outcome: Ongoing (interventions implemented as appropriate)      Problem: VTE, DVT and PE (Adult)  Goal: Signs and Symptoms of Listed Potential Problems Will be Absent, Minimized or Managed (VTE, DVT and PE)  Outcome: Ongoing (interventions implemented as appropriate)      Problem: Skin Injury Risk (Adult)  Goal: Skin Health and Integrity  Outcome: Ongoing (interventions implemented as appropriate)

## 2019-02-16 NOTE — NURSING NOTE
S/w Dr. Shipley RE patient's PEG site no longer bleeding. Order given to cancel surgery consult and proceed with discharge.

## 2019-02-18 NOTE — PROGRESS NOTES
Case Management Discharge Note    Final Note: [facility], left message with Gemma to confirm arrival. Sharon RN/CCP    Destination - Selection Complete      Service Provider Request Status Selected Services Address Phone Number Fax Number    [facility] [address] [address] [phone]      Durable Medical Equipment      No service has been selected for the patient.      Dialysis/Infusion      No service has been selected for the patient.      Home Medical Care      No service has been selected for the patient.      Community Resources      No service has been selected for the patient.

## 2019-02-21 NOTE — PROGRESS NOTES
CC: Bleeding around PEG tube    HPI: The patient is a very pleasant 79-year-old male that is here in the office today for evaluation of bleeding around PEG tube.  The patient had bleeding after PEG tube was placed on 2/2/2019 and this was controlled with manual pressure on dressings.  For the past 2 days at the nursing home they have noticed patient has been having more drainage of bloody fluid around the tube.  He was referred today for evaluation.  He reports slight discomfort around the PEG tube area.  Otherwise the patient has been used without problem and there is no bleeding.  The patient is on Eliquis for PE    O:   Alert, chronically ill-appearing  Nasal cannula with oxygen in place  Abdomen is soft, nontender and nondistended.  There is a PEG tube in the left upper abdomen with a dressing that is stained with blood.  Upon evaluation of the PEG tube there is bleeding from the PEG tube tunnel.  There is no bleeding from the stomach and the tube flushes very easily.  There is slight erythema over the left side of the PEG tube    Assessment and plan    The patient is a very pleasant 79-year-old male with dysphagia status post PEG tube placement.  Patient has been on full anticoagulation and has been having bleeding around the PEG tube.  I move away the bumper and evaluated the tract from the PEG tube.  There is evidence of bleeding from the tract on with silver nitrate the area was treated.  These did not stop the bleeding since the bleeding was coming from deeper.  I placed a Surgicel gauze and this controlled the bleeding.  4 x 4 dressings were placed and the bumper was adjusted again the skin at 3-1/2 cm.     -Advised the patient to keep the dressing in place for a week until he follow-up with me in the office  -Follow-up with me in the office in a week  -Patient to continue Daphniequis    Twin Bobby MD, FACS  General, Minimally Invasive and Endoscopic Surgery  [facility]    [address], [address]  [place], [place], [address]  P: [phone]  F: [phone]

## 2019-02-26 NOTE — PROGRESS NOTES
CC: Bleeding around PEG tube follow-up    HPI: The patient is a very pleasant 79-year-old male that is here in the office today for follow-up evaluation of bleeding around PEG tube. The patient had bleeding after PEG tube was placed on 2/2/2019 and this was controlled with manual pressure on dressings.   He is him in the office last week on he was found to have bleeding around the PEG tube at the skin level.  I applied silver nitrate and treated the tract.  The tract was also dressed with hemostatic gauze.  Since then he has been having only 2 episodes of small amount of drainage around the tube.  He has been using the tube without any problem.  He denies any fevers or chills, culture from the PEG tube tract site grew MRSA    O:   Alert, chronically ill-appearing  Nasal cannula with oxygen in place  Abdomen is soft, nontender and nondistended.  There is a PEG tube in the left upper abdomen with a dressing that is dry.  Upon removal of dressing there is evidence of an enlarged PEG tube tract but no evidence of bleeding.  There is fibrinous exudate at the tract    Assessment and plan    79-year-old male with dysphagia status post PEG tube placement.  He has been on full anticoagulation and had bleeding around PEG tube.  There is no blood today and PEG tube is working without any problem.  I recommend that they apply a moist gauze over the tract site around the PEG tube at least twice a day.  I discussed with his son that PEG tube should be a 2.5 cm at the skin on that they should avoid trauma to the tube.  MRSA on culture from PEG tube.  I do not think there is any signs of infection and this is likely a contaminant.   Patient and son verbalized understanding and agreed with the plan    -Dressing changes with saline and gauze at the PEG tube exit site around the tube twice daily to allow healing  -Continue using PEG tube  -Follow-up in my office as needed    Twin Bobby MD, FACS  General, Minimally Invasive and  Endoscopic Surgery  [facility]    [address], [address]  [place], [place], [address]  P: [phone]  F: [phone]

## 2019-03-26 PROBLEM — R13.12 OROPHARYNGEAL DYSPHAGIA: Status: ACTIVE | Noted: 2019-01-01

## 2019-03-27 NOTE — PROGRESS NOTES
CC: PEG tube dysfunction     HPI: Patient is a very pleasant 79-year-old male that presents today for reevaluation for PEG tube dysfunction.  Patient had multiple issues with PEG tube including bleeding and drainage since it was placed on 2/2/19.  For the past several days he has been having issues using the PEG tube.  They feel intermittent resistant to the flow.  They report small amount of yellowish drainage.  The patient is still working with speech therapy and is still n.p.o. for dysphagia     PMH:   Past Medical History:   Diagnosis Date   • Abdominal cramping     AT TIMES   • Anxiety    • Cellulitis     BILATERAL LOWER EXTREMITIES   • Chronic cough    • COPD (chronic obstructive pulmonary disease) (CMS/HCC)    • Dysphasia    • Edema, lower extremity    • Enlarged heart     PER PATIENT?? STATES SAW CARDIOLOGIST YRS AGO BUT DOES NOT REMEMBER NAME    • Hyperlipidemia    • Hypertension    • Hypogonadism male    • Laryngeal cancer (CMS/HCC) 2005    Squamous cell carcinoma with recurrence in 2019   • MRSA (methicillin resistant Staphylococcus aureus)    • Type 2 diabetes mellitus (CMS/HCC)    • Vocal cord paralysis         PSH:   Past Surgical History:   Procedure Laterality Date   • CHOLECYSTECTOMY N/A    • ENDOSCOPY N/A 2/2/2019    Procedure: ESOPHAGOGASTRODUODENOSCOPY;  Surgeon: Twin Bobby MD;  Location: [facility] OR;  Service: Gastroenterology   • LARYNGOSCOPY N/A 1/29/2019    Procedure: DIRECT SUSPENSION MICROLARYNGOSCOPY BIOPSY AND POSSIBLE CERVICE ESOPHAGOSCOPY;  Surgeon: Michael Bhagat MD;  Location: [facility] OR;  Service: ENT   • PEG TUBE INSERTION N/A 2/2/2019    Procedure: PERCUTANEOUS ENDOSCOPIC GASTROSTOMY TUBE INSERTION;  Surgeon: Twin Bobby MD;  Location: [facility] OR;  Service: Gastroenterology   • PROSTATECTOMY N/A 2004    due to BPH and abnormal cells   • VOCAL CORD BIOPSY Right 05/09/2005    Direct suspension microlaryngoscopy with excision of right vocal  "cord tumor-Dr. Michael Bhagat       MEDS: He takes Eliquis.  All other medications were reviewed     ALL: No known drug allergies    FH and SH: Family history is noncontributory to current issue.  The patient is  on does not smoke or drink any alcohol     ROS:   Constitutional: denies any weight changes, fatigue.  Reports weakness.  HEENT: Denies hearing loss and rhinorrhea  Cardiovascular: denies chest pain, palpitations, edemas.  Respiratory: Reports shortness of breath  Gastrointestinal: denies N&V, abd pain, diarrhea, constipation.  Genitourinary: denies dysuria, frequency.  Endocrine: denies cold intolerance, lethargy and flushing.  Hem: denies excessive bruising and postop bleeding.  Musculoskeletal: denies weakness, joint swelling, pain or stiffness.  Reports unstable gait  Neuro: denies seizures, CVA, paresthesia, or peripheral neuropathy.   Skin: denies change in nevi, rashes, masses.     PE:   Vitals:    03/26/19 1452   Height: 180.3 cm (71\")     Alert and oriented ×3, no acute distress.  Head is normocephalic and atraumatic.  Neck is supple there is no thyromegaly or lymphadenopathy  Chest is clear bilaterally there is no added sounds, on oxygen  Regular rate and rhythm, no murmurs  Abdomen is soft and nontender, is nondistended.  G-tube in place, tube is located 1 cm at the skin level.  There is small amount of granulation tissue around the tube.  The tube was unable to be flushed  No clubbing cyanosis or edema in lower or upper extremities    Assessment and plan    The patient is a very pleasant 79-year-old male with dysphagia and PEG tube dysfunction.  The tube was removed by me bedside since it was not working.  I attempted to place a 24 and a 20 Turkmen replacement G-tube and this was not able to be performed.  I think the tube was in the subcutaneous tissue.  I do not understand how they were able to use the tube but this may have happened recently.    -I recommend that the patient gets admitted " to the hospital for PEG tube placement.  He does not have any enteral access and will need to have IV hydration and medications before PEG placement is performed  -He is on Eliquis and will need to wait at least for 24 hours until PEG tube is placed  -Patient and his son verbalized understanding and agree with the plan     Twin Bobby MD  General, Minimally Invasive and Endoscopic Surgery  [facility]     [address], [address]  [place], [place], [address]  P: [phone]  F: [phone]

## 2019-03-28 NOTE — ANESTHESIA PREPROCEDURE EVALUATION
Anesthesia Evaluation     Patient summary reviewed                Airway   No difficulty expected  Dental      Pulmonary    (+) pulmonary embolism, COPD,   Cardiovascular     ECG reviewed  Rhythm: regular    (+) hypertension,       Neuro/Psych  GI/Hepatic/Renal/Endo    (+)   diabetes mellitus,     Musculoskeletal     Abdominal    Substance History      OB/GYN          Other                        Anesthesia Plan    ASA 3     MAC     Anesthetic plan, all risks, benefits, and alternatives have been provided, discussed and informed consent has been obtained with: patient.  Use of blood products discussed with patient .

## 2019-03-28 NOTE — ANESTHESIA POSTPROCEDURE EVALUATION
Patient: Rosalio Rene    Procedure Summary     Date:  03/28/19 Room / Location:  [facility] ENDOSCOPY 10 /  [facility]    Anesthesia Start:  0758 Anesthesia Stop:  0831    Procedure:  ESOPHAGOGASTRODUODENOSCOPY WITH GASTROSTOMY TUBE INSERTION AT AT 4.5 CM (N/A Esophagus) Diagnosis:       Oropharyngeal dysphagia      (Oropharyngeal dysphagia [R13.12])    Surgeon:  Twin Bobby MD Provider:  Davon Prado MD    Anesthesia Type:  MAC ASA Status:  3          Anesthesia Type: MAC  Last vitals  BP   134/81 (03/28/19 0839)   Temp   36.7 °C (98 °F) (03/28/19 0829)   Pulse   92 (03/28/19 0839)   Resp   12 (03/28/19 0839)     SpO2   92 % (03/28/19 0839)     Post Anesthesia Care and Evaluation    Patient location during evaluation: PACU  Patient participation: complete - patient participated  Level of consciousness: awake  Pain score: 1  Pain management: adequate  Airway patency: patent  Anesthetic complications: No anesthetic complications  PONV Status: none  Cardiovascular status: acceptable  Respiratory status: acceptable  Hydration status: acceptable

## 2019-03-30 NOTE — OUTREACH NOTE
Prep Survey      Responses   Facility patient discharged from?  [place]   Is patient eligible?  Yes   Discharge diagnosis  Orapharyngeal dysphagia-PEG tube placement this visit   Does the patient have one of the following disease processes/diagnoses(primary or secondary)?  General Surgery   Does the patient have Home health ordered?  Yes   What is the Home health agency?   [facility]   Is there a DME ordered?  No   Prep survey completed?  Yes          Kellen Negrete RN

## 2019-03-31 NOTE — OUTREACH NOTE
General Surgery Week 1 Survey      Responses   Facility patient discharged from?  [place]   Does the patient have one of the following disease processes/diagnoses(primary or secondary)?  General Surgery   Is there a successful TCM telephone encounter documented?  No   Week 1 attempt successful?  Yes   Call start time  1417   Call end time  1423   General alerts for this patient  Please call Alan (Caregiver) as patient has throat cancer and difficulty speaking.    Discharge diagnosis  Orapharyngeal dysphagia-PEG tube placement this visit   Is patient permission given to speak with other caregiver?  Yes   List who call center can speak with  Alan   Person spoke with today (if not patient) and relationship  Alan, Caregiver   Meds reviewed with patient/caregiver?  N/A   Is the patient having any side effects they believe may be caused by any medication additions or changes?  No   Does the patient have all medications related to this admission filled (includes all antibiotics, pain medications, etc.)  N/A   Is the patient taking all medications as directed (includes completed medication regime)?  N/A   Does the patient have a follow up appointment scheduled with their surgeon?  No   Has the patient kept scheduled appointments due by today?  N/A   What is the Home health agency?   [facility] for tube feedings   Has home health visited the patient within 72 hours of discharge?  Call prior to 72 hours   Psychosocial issues?  No   Did the patient receive a copy of their discharge instructions?  Yes   Nursing interventions  Reviewed instructions with patient   What is the patient's perception of their health status since discharge?  Same   Nursing interventions  Nurse provided patient education   Is the patient /caregiver able to teach back basic post-op care?  Lifting as instructed by MD in discharge instructions, Drive as instructed by MD in discharge instructions, Keep incision areas clean,dry and protected, Do  not remove steri-strips, Take showers only when approved by MD-sponge bathesther until then   Is the patient/caregiver able to teach back signs and symptoms of incisional infection?  Increased redness, swelling or pain at the incisonal site, Increased drainage or bleeding, Incisional warmth, Pus or odor from incision, Fever   Is the patient/caregiver able to teach back steps to recovery at home?  Set small, achievable goals for return to baseline health, Rest and rebuild strength, gradually increase activity, Make a list of questions for surgeon's appointment   Is the patient/caregiver able to teach back the hierarchy of who to call/visit for symptoms/problems? PCP, Specialist, Home health nurse, Urgent Care, ED, 911  Yes   Week 1 call completed?  Yes          Alissa Marie RN

## 2019-04-03 NOTE — ED NOTES
This nurse and Dr. Wheatley aspirated and flushed gtube successfully with no resistance or leakage.      Sade Livingston, SHMUEL  04/03/19 1924

## 2019-04-03 NOTE — ED PROVIDER NOTES
EMERGENCY DEPARTMENT ENCOUNTER    Room Number:  13/13  Date seen:  4/3/2019  Time seen: 7:07 PM  PCP: Erinn Howard MD   Surgeon: Dr. Bobby  Historian: Patient, Family      HPI:  Chief Complaint: Feeding tube issue    Context: Rosalio Rene is a 79 y.o. male who presents to the ED c/o feeding tube issue. Pt had a new G tube placed 5 days ago. Today, while being fed, family noted that drainage was coming from his previous G-tube site. He has also had some dark brown drainage that is malodorous. Pt has G-tube in place due to hx of throat CA. Family reports that pt has appeared increasingly weak.     Location: [facility]  Radiation: none  Intensity/Severity: mild  Duration: PTA  Timing: intermittent  Progression: unchanged  Aggravating Factors: tube feeding  Alleviating Factors: none  Previous Episodes: none  Treatment before arrival: had new G-tube placed 5 days ago  Associated Symptoms: none    PAST MEDICAL HISTORY  Active Ambulatory Problems     Diagnosis Date Noted   • Diabetes mellitus type 2, uncontrolled (CMS/[facility]) 01/21/2016   • Hypogonadism in male 01/21/2016   • BP (high blood pressure) 01/21/2016   • HLD (hyperlipidemia) 01/21/2016   • Abnormal weight gain 01/21/2016   • Diabetes mellitus type 2, uncontrolled, with complications (CMS/[facility]) 01/21/2016   • Type 2 diabetes mellitus with diabetic nephropathy (CMS/[facility]) 01/21/2016   • Laryngeal cancer (CMS/[facility]) 01/29/2019   • Acute respiratory failure with hypercapnia (CMS/[facility]) 01/30/2019   • Dysphagia 01/24/2019   • COPD (chronic obstructive pulmonary disease) (CMS/[facility]) 02/03/2019   • Hypertension 02/03/2019   • Type 2 diabetes mellitus (CMS/[facility]) 02/03/2019   • Vocal cord paralysis 02/03/2019   • Aspiration pneumonia of both lower lobes (healthcare associated) 02/03/2019   • Acute respiratory failure with hypoxia and hypercapnia (CMS/[facility]) 02/03/2019   • Hypokalemia 02/03/2019   • Decubitus ulcer of coccyx, unspecified pressure ulcer stage 02/03/2019   • Pulmonary  embolism on right (CMS/[facility]) 02/12/2019   • Oropharyngeal dysphagia 03/26/2019     Resolved Ambulatory Problems     Diagnosis Date Noted   • No Resolved Ambulatory Problems     Past Medical History:   Diagnosis Date   • Abdominal cramping    • Anxiety    • Cellulitis    • Chronic cough    • COPD (chronic obstructive pulmonary disease) (CMS/[facility])    • Dysphasia    • Edema, lower extremity    • Enlarged heart    • Hyperlipidemia    • Hypertension    • Hypogonadism male    • Laryngeal cancer (CMS/[facility]) 2005   • MRSA (methicillin resistant Staphylococcus aureus)    • Type 2 diabetes mellitus (CMS/[facility])    • Vocal cord paralysis          PAST SURGICAL HISTORY  Past Surgical History:   Procedure Laterality Date   • CHOLECYSTECTOMY N/A    • ENDOSCOPY N/A 2/2/2019    Procedure: ESOPHAGOGASTRODUODENOSCOPY;  Surgeon: Twin Bobby MD;  Location: [facility] MAIN OR;  Service: Gastroenterology   • ENDOSCOPY W/ PEG TUBE PLACEMENT N/A 3/28/2019    Procedure: ESOPHAGOGASTRODUODENOSCOPY WITH GASTROSTOMY TUBE INSERTION AT AT 4.5 CM;  Surgeon: Twin Bobby MD;  [facility];  Service: General   • LARYNGOSCOPY N/A 1/29/2019    Procedure: DIRECT SUSPENSION MICROLARYNGOSCOPY BIOPSY AND POSSIBLE CERVICE ESOPHAGOSCOPY;  Surgeon: Michael Bhagat MD;  Location: [facility] MAIN OR;  Service: ENT   • PEG TUBE INSERTION N/A 2/2/2019    Procedure: PERCUTANEOUS ENDOSCOPIC GASTROSTOMY TUBE INSERTION;  Surgeon: Twin Bobby MD;  Location: [facility] MAIN OR;  Service: Gastroenterology   • PROSTATECTOMY N/A 2004    due to BPH and abnormal cells   • VOCAL CORD BIOPSY Right 05/09/2005    Direct suspension microlaryngoscopy with excision of right vocal cord tumor-Dr. Michael Bhagat         FAMILY HISTORY  Family History   Problem Relation Age of Onset   • Cancer Other    • Heart disease Other    • Malig Hyperthermia Neg Hx          SOCIAL HISTORY  Social History     Socioeconomic History   • Marital status: Single      Spouse name: Not on file   • Number of children: Not on file   • Years of education: Not on file   • Highest education level: Not on file   Tobacco Use   • Smoking status: Former Smoker     Packs/day: 1.50     Years: 50.00     Pack years: 75.00     Types: Cigarettes     Last attempt to quit: 3/8/2004     Years since quitting: 15.0   • Smokeless tobacco: Never Used   Substance and Sexual Activity   • Alcohol use: Yes     Comment: SOCIAL   • Drug use: No   • Sexual activity: Defer         ALLERGIES  Patient has no known allergies.        REVIEW OF SYSTEMS  Review of Systems   Constitutional: Negative for fever.   HENT: Negative for sore throat.    Respiratory: Negative for shortness of breath.    Cardiovascular: Negative for chest pain.   Gastrointestinal: Negative for abdominal pain.        G-tube issue   Endocrine: Negative for polyuria.   Genitourinary: Negative for dysuria.   Musculoskeletal: Negative for neck pain.   Skin: Negative for rash.   Neurological: Negative for headaches.   All other systems reviewed and are negative.           PHYSICAL EXAM  ED Triage Vitals [04/03/19 1814]   Temp Heart Rate Resp BP SpO2   98.4 °F (36.9 °C) (!) 126 18 139/79 92 %      Temp src Heart Rate Source Patient Position BP Location FiO2 (%)   -- -- Lying Right arm --         GENERAL: not distressed  HENT: nares patent  EYES: no scleral icterus  CV: regular rhythm, regular rate  RESPIRATORY: normal effort  ABDOMEN: soft, tenderness to the L upper side of new G-tube site, no tenderness to old G-tube site, [facility] G-tube with surrounding erythema and dried blood. There is an old G-tube insertion site medially.  MUSCULOSKELETAL: no deformity  NEURO: alert, VALDOVINOS, FC  SKIN: warm, dry, no fluctuance     Vital signs and nursing notes reviewed.      PROCEDURES  Procedures        MEDICATIONS GIVEN IN ER  Medications - No data to display                PROGRESS AND CONSULTS     7:21 PM  G-tube is able to flush without any drainage from old  G-tube site. I discussed this with pt family and plan to discharge. Pt and family understands and agrees with plan. All concerns were addressed.        MEDICAL DECISION MAKING      MDM  Number of Diagnoses or Management Options  Drainage from gastrostomy tube site (CMS/[facility]):   Diagnosis management comments: he presents with complaint of drainage from his old gastrostomy tube site.  He does have some mild drainage that was evidence on the gauze overlying his old site.  However, there is no fluctuance or redness or warmth to that old site.  With deep palpation, I am unable to express any contents that would be suggestive of any purulence or other active infection.  The patient's nephew stated that whenever food or liquid was injected into the fresh G-tube site the patient would then have intragastric contents leave the old site.  We therefore tested this with infusion of 120 cc of water.  There is A[place]ley no drainage from the old gastrostomy tube site.  Patient states that he feels well and he agrees that his tube is functioning properly without any complications that he can observe to healed site.  I discussed the case with the patient's nephew and we are all in agreement that patient is safe for discharge home.  I see no indication for any further imaging at this time.  I did give clear return precautions should there be any developing signs concerning for abscess or any intra-abdominal pathology.  He will follow-up with Dr. Bobby.       Amount and/or Complexity of Data Reviewed  Obtain history from someone other than the patient: yes (Nephew)  Review and summarize past medical records: yes (PEG tube replaced 3/29/19)               DIAGNOSIS  Final diagnoses:   Drainage from gastrostomy tube site (CMS/[facility])         DISPOSITION  DISCHARGE    Patient discharged in stable condition.    Reviewed implications of results, diagnosis, meds, responsibility to follow up, warning signs and symptoms of possible worsening,  potential complications and reasons to return to ER.    Patient/Family voiced understanding of above instructions.    Discussed plan for discharge, as there is no emergent indication for admission. Patient referred to primary care provider for BP management due to today's BP. Pt/family is agreeable and understands need for follow up and repeat testing.  Pt is aware that discharge does not mean that nothing is wrong but it indicates no emergency is present that requires admission and they must continue care with follow-up as given below or physician of their choice.     FOLLOW-UP  Twin Bobby MD  [address] [address]  [phone]    Call in 1 day  For Surgeon follow-up         Medication List      No changes were made to your prescriptions during this visit.                   Latest Documented Vital Signs:  As of 7:22 PM  BP- 139/79 HR- 115 Temp- 98.4 °F (36.9 °C) O2 sat- 92%        --  Documentation assistance provided by casey French for Dr. Dioni MD.  Information recorded by the scribe was done at my direction and has been verified and validated by me.                 Ambrocio French  04/03/19 2779       Leo Wheatley II, MD  04/03/19 7615

## 2019-04-03 NOTE — ED NOTES
"PT HAD RECENT GTUBE PUT IN ON Thursday, WHILE FAMILY WAS TRYING TO FEED PT, TUBE STARTED TO COME OUT AND \"DARK BROWN FOUL SMELLING SUBSTANCE WAS COMING OUT OF THE OTHER HOLE.\"      Skyla Vazquez RN  04/03/19 1817    "

## 2019-04-08 NOTE — OUTREACH NOTE
General Surgery Week 2 Survey      Responses   Facility patient discharged from?  [facility]   Does the patient have one of the following disease processes/diagnoses(primary or secondary)?  General Surgery   Week 2 attempt successful?  Yes   Call start time  1536   Call end time  1558   General alerts for this patient  Please call Alan (Caregiver) as patient has throat cancer and difficulty speaking.    Discharge diagnosis  Orapharyngeal dysphagia-PEG tube placement this visit   Is patient permission given to speak with other caregiver?  Yes   List who call center can speak with  Alan   Meds reviewed with patient/caregiver?  N/A   Is the patient having any side effects they believe may be caused by any medication additions or changes?  No   Does the patient have all medications related to this admission filled (includes all antibiotics, pain medications, etc.)  N/A   Is the patient taking all medications as directed (includes completed medication regime)?  N/A   Does the patient have a follow up appointment scheduled with their surgeon?  Yes   Has the patient kept scheduled appointments due by today?  Yes   What is the Home health agency?   [facility] for tube feedings   Has home health visited the patient within 72 hours of discharge?  Yes   Psychosocial issues?  No   Did the patient receive a copy of their discharge instructions?  Yes   Nursing interventions  Reviewed instructions with patient   What is the patient's perception of their health status since discharge?  Improving   Nursing interventions  Nurse provided patient education   Is the patient /caregiver able to teach back basic post-op care?  Lifting as instructed by MD in discharge instructions, Drive as instructed by MD in discharge instructions, Keep incision areas clean,dry and protected, Do not remove steri-strips, Take showers only when approved by MD-sponge bathe until then   Is the patient/caregiver able to teach back signs and symptoms of  incisional infection?  Increased redness, swelling or pain at the incisonal site, Increased drainage or bleeding, Incisional warmth, Pus or odor from incision, Fever   Is the patient/caregiver able to teach back steps to recovery at home?  Set small, achievable goals for return to baseline health, Rest and rebuild strength, gradually increase activity, Make a list of questions for surgeon's appointment   Is the patient/caregiver able to teach back the hierarchy of who to call/visit for symptoms/problems? PCP, Specialist, Home health nurse, Urgent Care, ED, 911  Yes   Additional teach back comments  [facility] will be in tomorrow.  RN/OT out on Wednesday and Caregiver will make an appt with Clinical Dietician for Wednesday while they see the ENT.   Week 2 call completed?  Yes          Kym Howard RN

## 2019-04-08 NOTE — PROGRESS NOTES
CC: Drainage of old G-tube site    HPI: The patient is a very pleasant 79-year-old male that is here today for follow-up after being seen in the emergency room for drainage from old G-tube site.  The patient originally had a PEG tube placed by me on 2/2/2019.  He had bleeding at the PEG tube site and eventually the PEG tube was dislodged.  He underwent another PEG tube placement on 3/28/2019.  Since then he had no problems with tube until last Wednesday when he noticed drainage of brownish and malodorous fluid at the prior G-tube site.  The emergency room the tube was flushed and he was discharged home.  Since then he has been having drainage at the prior G-tube site that has been decreasing on a daily basis.  He reports also drainage of small amount of fluid around the new PEG tube.  He has not have any problems with the new PEG tube    Past Medical History:   Diagnosis Date   • Abdominal cramping       AT TIMES   • Anxiety     • Cellulitis       BILATERAL LOWER EXTREMITIES   • Chronic cough     • COPD (chronic obstructive pulmonary disease) (CMS/HCC)     • Dysphasia     • Edema, lower extremity     • Enlarged heart       PER PATIENT?? STATES SAW CARDIOLOGIST YRS AGO BUT DOES NOT REMEMBER NAME    • Hyperlipidemia     • Hypertension     • Hypogonadism male     • Laryngeal cancer (CMS/HCC) 2005     Squamous cell carcinoma with recurrence in 2019   • MRSA (methicillin resistant Staphylococcus aureus)     • Type 2 diabetes mellitus (CMS/HCC)     • Vocal cord paralysis              PSH:   Surgical History             Past Surgical History:   Procedure Laterality Date   • CHOLECYSTECTOMY N/A     • ENDOSCOPY N/A 2/2/2019     Procedure: ESOPHAGOGASTRODUODENOSCOPY;  Surgeon: Twin Bobby MD;  Location: [facility];  Service: Gastroenterology   • LARYNGOSCOPY N/A 1/29/2019     Procedure: DIRECT SUSPENSION MICROLARYNGOSCOPY BIOPSY AND POSSIBLE CERVICE ESOPHAGOSCOPY;  Surgeon: Michael Bhagat MD;  Location:   [facility] MAIN OR;  Service: ENT   • PEG TUBE INSERTION N/A 2/2/2019     Procedure: PERCUTANEOUS ENDOSCOPIC GASTROSTOMY TUBE INSERTION;  Surgeon: Twin Bobby MD;  [facility];  Service: Gastroenterology   • PROSTATECTOMY N/A 2004     due to BPH and abnormal cells   • VOCAL CORD BIOPSY Right 05/09/2005     Direct suspension microlaryngoscopy with excision of right vocal cord tumor-Dr. Michael Bhagat      -PEG tube placement on 3/28/2019     MEDS: He takes Eliquis.  All other medications were reviewed     ALL: No known drug allergies     FH and SH: Family history is noncontributory to current issue.  The patient is  on does not smoke or drink any alcohol     ROS:   Constitutional: denies any weight changes, fatigue.  Reports weakness.  HEENT: Denies hearing loss and rhinorrhea  Cardiovascular: denies chest pain, palpitations, edemas.  Respiratory: Reports shortness of breath  Gastrointestinal: denies N&V, abd pain, diarrhea, constipation.  Genitourinary: denies dysuria, frequency.  Endocrine: denies cold intolerance, lethargy and flushing.  Hem: denies excessive bruising and postop bleeding.  Musculoskeletal: denies weakness, joint swelling, pain or stiffness.  Reports unstable gait  Neuro: denies seizures, CVA, paresthesia, or peripheral neuropathy.   Skin: denies change in nevi, rashes, masses.     O:   Alert and oriented x3, no acute distress  Abdomen is soft, nontender and nondistended.  There is no rebound or guarding.  He has a PEG tube in place in the left upper quadrant and there is some maceration of the skin surrounding the PEG tube.  There is the prior PEG tube skin opening that has a small amount of drainage and surrounding erythema without pain or evidence of fluctuation or abscess    Assessment and plan    79-year-old male with dysphagia status post PEG tube placement after inadvertent dislodgment.   Unfortunately, since the patient had so many issues with a prior PEG tube I  decided to place a PEG tube far away from the prior PEG tube site.  He is having drainage with feeds at the old PEG tube site that is more obvious with Valsalva maneuver.  It seems that the drainage is slowly decreasing and the opening of the skin is very small.  I discussed with them about the need to decrease the amount of feeds per male and instead of giving him 350 every 8 hours to split the amount and give every 4 hours.  I do not think there is any signs of infection.  Discussed with him about the need to place sincere oxide cream around the prior PEG tube and the new PEG tube site.     -Increase the frequency and decrease the amount of tube feeds  -Calmoseptine cream over PEG tube site on prior PEG tube wound  -Follow-up in my office in 1 week if still having issues    Twin Bobby MD, FACS  General, Minimally Invasive and Endoscopic Surgery  [facility]    [address], [address]  [place], [place], [address]  P: [phone]  F: [phone]

## 2019-04-17 NOTE — OUTREACH NOTE
General Surgery Week 3 Survey      Responses   Facility patient discharged from?  [place]   Does the patient have one of the following disease processes/diagnoses(primary or secondary)?  General Surgery   Week 3 attempt successful?  No   Unsuccessful attempts  Attempt 1          Yuli Zendejas RN

## 2019-04-18 NOTE — OUTREACH NOTE
General Surgery Week 3 Survey      Responses   Facility patient discharged from?  [place]   Does the patient have one of the following disease processes/diagnoses(primary or secondary)?  General Surgery   Week 3 attempt successful?  No   Unsuccessful attempts  Attempt 2          Laron Hand RN

## 2019-05-08 NOTE — PROGRESS NOTES
[facility] OUTPATIENT FOLLOW UP CLINIC VISIT    REASON FOR FOLLOW-UP:    1.  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  3.  Hoarse voice with vocal cord paralysis.     4.  DVT and pulmonary embolism diagnosed on 12/12/2019.      HISTORY OF PRESENT ILLNESS:  Rosalio Rene is a 79 y.o. male who returns today for follow up of the above issues.  He was initially seen during his hospitalization at [facility] in January 2019.  He was discharged in early February with plans for close follow-up.  However, shortly after discharge she developed a right lower extremity DVT and pulmonary embolism and was rehospitalized for this.  He is currently on Eliquis which he tolerates well without any bleeding.  He subsequently developed leaking from his gastric feeding tube site.  This required replacement of the feeding tube by Dr. Bobby.  The original site is now healed and he is increasing his feedings accordingly.  He has lost some weight but this is improving.  He continues to have a very hoarse voice.  He is swallowing water and liquids without too much difficulty but he has not tried to swallow any other food.  He denies any pain.  He continues to have some lower extremity edema.        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  He has followed up with Dr. Bhagat since that time.  He developed a hoarse voice and difficulty swallowing and he was found to have bilateral vocal cord paralysis.       An esophagram was performed on 12/19/2018 showing oropharyngeal dysphagia with nirali aspiration of barium extending into the trachea and right mainstem bronchus.       He had CT imaging of the neck done on 1/26/2019 that did not show evidence for enlarged lymphadenopathy and no discrete mass.          Leola performed a direct laryngoscopy under anesthesia on 1/29/2019.  This showed normal but paralyzed vocal cords.  Diffuse necrosis and ulceration was discovered on the posterior surface of the arytenoids, post cricoid and medial aspect of the pyriform sinuses.  Frozen section is positive for invasive squamous cell carcinoma.       Dr. Meyers with radiation oncology saw him and no further radiation was recommended.  A tracheostomy was recommended that the patient declined this.    He was subsequently readmitted in early February with a DVT and PE.  He is on Eliquis for this.    He subsequently had difficulty with his gastric feeding tube and ultimately this needed to be replaced.    ALLERGIES:  No Known Allergies    MEDICATIONS:  The medication list has been reviewed with the patient by the medical assistant, and the list has been updated in the electronic medical record, which I reviewed.  Medication dosages and frequencies were confirmed to be accurate.    REVIEW OF SYSTEMS:  PAIN:  See Vital Signs below.  GENERAL:  No fevers, chills, night sweats, or unintended weight loss.  SKIN:  No rashes or non-healing lesions.  HEME/LYMPH:  No abnormal bleeding.  No palpable lymphadenopathy.  EYES:  No vision changes or diplopia.  ENT: Hoarse voice and difficulty swallowing  RESPIRATORY:  No cough, shortness of breath, hemoptysis, or wheezing.  CARDIOVASCULAR:  No chest pain, palpitations, orthopnea, or dyspnea on exertion.  GASTROINTESTINAL:  No abdominal pain, nausea, vomiting, constipation, diarrhea, melena, or hematochezia.  Leakage from his feeding tube as improved.  GENITOURINARY:  No dysuria or hematuria.  MUSCULOSKELETAL:  No joint pain, swelling, or erythema.  NEUROLOGIC:  No dizziness, loss of consciousness, or seizures.  PSYCHIATRIC:  No depression, anxiety, or mood changes.    Vitals:    05/08/19 0854   BP: 137/77   Pulse: 110   Resp: 16   Temp: 99 °F (37.2 °C)   TempSrc: Oral   SpO2: 90%  Comment: Refused O2  "tank   Weight: 80.4 kg (177 lb 3.2 oz)   Height: 180.3 cm (70.98\")   PainSc: 0-No pain  Comment: throat cancer       PHYSICAL EXAMINATION:  GENERAL:  Well-developed well-nourished male; awake, alert and oriented, in no acute distress.  Hoarse voice  SKIN:  Warm and dry, without rashes, purpura, or petechiae.  Large skin lesion right upper chest, chronic.  HEAD:  Normocephalic, atraumatic.  EYES:  Pupils equal, round and reactive to light.  Extraocular movements intact.  Conjunctivae normal.  EARS:  Hearing intact.  NOSE:  Septum midline.  No excoriations or nasal discharge.  MOUTH:  No stomatitis or ulcers.  Lips are normal.  THROAT:  Oropharynx without lesions or exudates.  NECK: No lymphadenopathy. Chronic changes.  LYMPHATICS:  No cervical, supraclavicular, axillary,lymphadenopathy.  CHEST:  Lungs are clear to auscultation bilaterally.  No wheezes, rales, or rhonchi.  HEART:  Regular rate; normal rhythm.  No murmurs, gallops or rubs.  ABDOMEN:  Soft, non-tender, non-distended.  Normal active bowel sounds.  No organomegaly.  EXTREMITIES: 1+ bilateral lower extremity edema  NEUROLOGICAL:  No focal neurologic deficits.    DIAGNOSTIC DATA:  Results for orders placed or performed in visit on 05/08/19   CBC Auto Differential   Result Value Ref Range    WBC 11.87 (H) 3.40 - 10.80 10*3/mm3    RBC 3.91 (L) 4.14 - 5.80 10*6/mm3    Hemoglobin 11.3 (L) 13.0 - 17.7 g/dL    Hematocrit 35.3 (L) 37.5 - 51.0 %    MCV 90.3 79.0 - 97.0 fL    MCH 28.9 26.6 - 33.0 pg    MCHC 32.0 31.5 - 35.7 g/dL    RDW 13.1 12.3 - 15.4 %    RDW-SD 42.7 37.0 - 54.0 fl    MPV 11.9 6.0 - 12.0 fL    Platelets 198 140 - 450 10*3/mm3    Neutrophil % 80.7 (H) 42.7 - 76.0 %    Lymphocyte % 11.4 (L) 19.6 - 45.3 %    Monocyte % 6.5 5.0 - 12.0 %    Eosinophil % 0.7 0.3 - 6.2 %    Basophil % 0.3 0.0 - 1.5 %    Immature Grans % 0.4 0.0 - 0.5 %    Neutrophils, Absolute 9.58 (H) 1.70 - 7.00 10*3/mm3    Lymphocytes, Absolute 1.35 0.70 - 3.10 10*3/mm3    Monocytes, " Absolute 0.77 0.10 - 0.90 10*3/mm3    Eosinophils, Absolute 0.08 0.00 - 0.40 10*3/mm3    Basophils, Absolute 0.04 0.00 - 0.20 10*3/mm3    Immature Grans, Absolute 0.05 0.00 - 0.05 10*3/mm3    nRBC 0.0 0.0 - 0.2 /100 WBC       IMAGING: None reviewed today    ASSESSMENT:  This is a 79 y.o. male with:  1.  Recurrent head neck squamous cell carcinoma: Further radiation is not possible.  He has a marginal performance status at this point.  We discussed the possibility of palliative Erbitux today.  We discussed obtaining a PET scan at this point since it has been a few months since he has had any imaging.  He wants to research the Erbitux and he prefers a conservative approach at this point with no imaging and no plans for treatment right now as he understands that all treatment is palliative.  He will research the Erbitux and let us know if he is interested in proceeding.  I gave him some written information regarding this today.  2.  Hoarse voice with vocal cord paralysis, with dysphagia and aspiration: He has a gastric feeding tube in place.  3.  Hypoxia: He has oxygen at home.  He has not been interested in a tracheostomy.  I did advise him that his respiratory status could decline regardless of whether he decides to do any treatment.  Particularly if he elects not to do any treatment, I advised him to think ahead and think about advanced directives and a living will and to make sure that he has his wishes written down and that everybody around him knows if he does not wish to be intubated or resuscitated.  I did not recommend resuscitation or intubation under the circumstances, particularly if he does not desire any therapy.  4.  Gastric feeding tube in place with leakage from his old gastric feeding tube site: This is being managed by Dr. Bobby.  He is receiving tube feedings.  He has been in contact with the nutritionists.    PLAN:  1.  I gave him some written information regarding Erbitux today.  I suggested a PET  scan and palliative therapy with Erbitux.  He will look at the information I gave him and consider this.  He does not desire to have any imaging at this time.  He does not desire to make any follow-up appointments at this time.  I advised him that we are certainly available to assist with his care in the future.  He will contact our office if he desires.    His nephew was present with him today and assisted with the history.

## 2019-06-14 PROBLEM — Z51.5 HOSPICE CARE PATIENT: Status: ACTIVE | Noted: 2019-01-01

## 2019-06-14 PROBLEM — I50.32 CHRONIC DIASTOLIC CONGESTIVE HEART FAILURE (HCC): Status: ACTIVE | Noted: 2019-01-01
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
## 2019-06-14 PROBLEM — E87.5 HYPERKALEMIA: Status: ACTIVE | Noted: 2019-01-01

## 2019-06-14 PROBLEM — A41.9 SEPSIS DUE TO PNEUMONIA (HCC): Status: ACTIVE | Noted: 2019-01-01

## 2019-06-14 PROBLEM — J18.9 PNEUMONIA OF BOTH LOWER LOBES DUE TO INFECTIOUS ORGANISM: Status: ACTIVE | Noted: 2019-01-01

## 2019-06-14 PROBLEM — J18.9 SEPSIS DUE TO PNEUMONIA (HCC): Status: ACTIVE | Noted: 2019-01-01

## 2019-06-14 NOTE — ED TRIAGE NOTES
To ER via EMS.  EMS got the call for elevated blood sugar and AMS..  EMS found pt unresponsive and hypotensive 76/45.  Pt is awake and alert at this time.  Pt has throat CA and is a hosporus pt.  Glucose 502 on arrival to ER.  Morphine 5mg given approx 2200.

## 2019-06-14 NOTE — ED PROVIDER NOTES
EMERGENCY DEPARTMENT ENCOUNTER    CHIEF COMPLAINT  Chief Complaint: hyperglycemia  History given by: EMS, hospice  History limited by: unresponsive  Room Number: 16/16  PMD: Provider, No Known      HPI:  Pt is a 79 y.o. male hospice DNR pt for throat cancer presents complaining of hyperglycemia, after pt was given insulin but no needles tonight. Hospice found BS in the 600's, and a systolic BP in the 70's. EMS had O2 sat of 98% on non re breather. EMS found pt in AMS, having difficulty breathing, being lethargic and unresponsive. Family on the way, w/ POA nephew.    Duration: tonight  Onset: gradual  Timing: constant  Location: endocrine system  Radiation: n/a  Quality: in 600's  Intensity/Severity: moderate  Progression: worsening  Associated Symptoms: hypotension, AMS  Aggravating Factors: not having needles for insulin  Alleviating Factors: none  Previous Episodes: n/a  Treatment before arrival: EMS care    PAST MEDICAL HISTORY  Active Ambulatory Problems     Diagnosis Date Noted   • Diabetes mellitus type 2, uncontrolled (CMS/[facility]) 01/21/2016   • Hypogonadism in male 01/21/2016   • BP (high blood pressure) 01/21/2016   • HLD (hyperlipidemia) 01/21/2016   • Abnormal weight gain 01/21/2016   • Diabetes mellitus type 2, uncontrolled, with complications (CMS/[facility]) 01/21/2016   • Type 2 diabetes mellitus with diabetic nephropathy (CMS/[facility]) 01/21/2016   • Laryngeal cancer (CMS/[facility]) 01/29/2019   • Acute respiratory failure with hypercapnia (CMS/[facility]) 01/30/2019   • Dysphagia 01/24/2019   • COPD (chronic obstructive pulmonary disease) (CMS/[facility]) 02/03/2019   • Hypertension 02/03/2019   • Type 2 diabetes mellitus (CMS/[facility]) 02/03/2019   • Vocal cord paralysis 02/03/2019   • Aspiration pneumonia of both lower lobes (healthcare associated) 02/03/2019   • Acute respiratory failure with hypoxia and hypercapnia (CMS/[facility]) 02/03/2019   • Hypokalemia 02/03/2019   • Decubitus ulcer of coccyx, unspecified pressure ulcer stage  02/03/2019   • Pulmonary embolism on right (CMS/[facility]) 02/12/2019   • Oropharyngeal dysphagia 03/26/2019     Resolved Ambulatory Problems     Diagnosis Date Noted   • No Resolved Ambulatory Problems     Past Medical History:   Diagnosis Date   • Abdominal cramping    • Anxiety    • Cellulitis    • Chronic cough    • COPD (chronic obstructive pulmonary disease) (CMS/[facility])    • Dysphasia    • Edema, lower extremity    • Enlarged heart    • Hyperlipidemia    • Hypertension    • Hypogonadism male    • Laryngeal cancer (CMS/[facility]) 2005   • MRSA (methicillin resistant Staphylococcus aureus)    • Type 2 diabetes mellitus (CMS/[facility])    • Vocal cord paralysis        PAST SURGICAL HISTORY  Past Surgical History:   Procedure Laterality Date   • CHOLECYSTECTOMY N/A    • ENDOSCOPY N/A 2/2/2019    Procedure: ESOPHAGOGASTRODUODENOSCOPY;  Surgeon: Twin Bobby MD;  Location: [facility] MAIN OR;  Service: Gastroenterology   • ENDOSCOPY W/ PEG TUBE PLACEMENT N/A 3/28/2019    Procedure: ESOPHAGOGASTRODUODENOSCOPY WITH GASTROSTOMY TUBE INSERTION AT AT 4.5 CM;  Surgeon: Twin Bobby MD;  Location: [facility] ENDOSCOPY;  Service: General   • LARYNGOSCOPY N/A 1/29/2019    Procedure: DIRECT SUSPENSION MICROLARYNGOSCOPY BIOPSY AND POSSIBLE CERVICE ESOPHAGOSCOPY;  Surgeon: Michael Bhagat MD;  Location: [facility] MAIN OR;  Service: ENT   • PEG TUBE INSERTION N/A 2/2/2019    Procedure: PERCUTANEOUS ENDOSCOPIC GASTROSTOMY TUBE INSERTION;  Surgeon: Twin Bobby MD;  Location: [facility] MAIN OR;  Service: Gastroenterology   • PROSTATECTOMY N/A 2004    due to BPH and abnormal cells   • VOCAL CORD BIOPSY Right 05/09/2005    Direct suspension microlaryngoscopy with excision of right vocal cord tumor-Dr. Michael Bhagat       FAMILY HISTORY  Family History   Problem Relation Age of Onset   • Cancer Other    • Heart disease Other    • Malig Hyperthermia Neg Hx        SOCIAL HISTORY  Social History     Socioeconomic History   •  Marital status: Single     Spouse name: Not on file   • Number of children: Not on file   • Years of education: Not on file   • Highest education level: Not on file   Occupational History     Employer: [employer]   Tobacco Use   • Smoking status: Former Smoker     Packs/day: 1.50     Years: 50.00     Pack years: 75.00     Types: Cigarettes     Last attempt to quit: 3/8/2004     Years since quitting: 15.2   • Smokeless tobacco: Never Used   Substance and Sexual Activity   • Alcohol use: Yes     Comment: SOCIAL   • Drug use: No   • Sexual activity: Defer       ALLERGIES  Patient has no known allergies.    REVIEW OF SYSTEMS  Review of Systems   Constitutional: Negative for activity change, appetite change and fever.   HENT: Negative for congestion and sore throat.    Eyes: Negative.    Respiratory: Negative for cough and shortness of breath.    Cardiovascular: Negative for chest pain and leg swelling.        (+) hypotension   Gastrointestinal: Negative for abdominal pain, diarrhea and vomiting.   Endocrine: Negative.         (+) hyperglycemia   Genitourinary: Negative for decreased urine volume and dysuria.   Musculoskeletal: Negative for neck pain.   Skin: Negative for rash and wound.   Allergic/Immunologic: Negative.    Neurological: Negative for weakness, numbness and headaches.   Hematological: Negative.    Psychiatric/Behavioral: Negative.         (+) lethargic  (+) unresponsive   All other systems reviewed and are negative.      PHYSICAL EXAM  ED Triage Vitals   Temp Pulse Resp BP SpO2   -- -- -- -- --      Temp src Heart Rate Source Patient Position BP Location FiO2 (%)   -- -- -- -- --       Physical Exam   Constitutional: He is oriented to person, place, and time. No distress.   HENT:   Head: Normocephalic and atraumatic.   Eyes: EOM are normal. Pupils are equal, round, and reactive to light.   Neck: Normal range of motion. Neck supple.   Cardiovascular: Regular rhythm and normal heart  sounds. Tachycardia present.   Pulmonary/Chest: Effort normal. No respiratory distress. He has decreased breath sounds. He has rhonchi (bialt).   Abdominal: Soft. There is no tenderness. There is no rebound and no guarding.   g tube in place   Musculoskeletal: Normal range of motion. He exhibits no edema.   2+ pedal edema bilat   Neurological: He is alert and oriented to person, place, and time. He has normal sensation and normal strength.   Skin: Skin is warm and dry.   Psychiatric: Mood and affect normal.   Lethargic and minimally responsive   Nursing note and vitals reviewed.      LAB RESULTS  Lab Results (last 24 hours)     Procedure Component Value Units Date/Time    POC Glucose Once [[id number]]  (Abnormal) Collected:  06/14/19 0039    Specimen:  Blood Updated:  06/14/19 0041     Glucose 502 mg/dL     CBC & Differential [[id number]] Collected:  06/14/19 0049    Specimen:  Blood Updated:  06/14/19 0112    Narrative:       The following orders were created for panel order CBC & Differential.  Procedure                               Abnormality         Status                     ---------                               -----------         ------                     CBC Auto Differential[[id number]]        Abnormal            Final result                 Please view results for these tests on the individual orders.    Comprehensive Metabolic Panel [[id number]]  (Abnormal) Collected:  06/14/19 0049    Specimen:  Blood Updated:  06/14/19 0146     Glucose 525 mg/dL      BUN 52 mg/dL      Creatinine 1.13 mg/dL      Sodium 137 mmol/L      Potassium 6.1 mmol/L      Chloride 82 mmol/L      CO2 36.1 mmol/L      Calcium 9.9 mg/dL      Total Protein 6.6 g/dL      Albumin 2.40 g/dL      ALT (SGPT) 31 U/L      AST (SGOT) 28 U/L      Alkaline Phosphatase 61 U/L      Total Bilirubin 0.3 mg/dL      eGFR Non African Amer 63 mL/min/1.73      Globulin 4.2 gm/dL      A/G Ratio 0.6 g/dL      BUN/Creatinine Ratio 46.0     Anion Gap 18.9  mmol/L     Narrative:       GFR Normal >60  Chronic Kidney Disease <60  Kidney Failure <15    CBC Auto Differential [[id number]]  (Abnormal) Collected:  06/14/19 0049    Specimen:  Blood Updated:  06/14/19 0112     WBC 26.62 10*3/mm3      RBC 3.80 10*6/mm3      Hemoglobin 10.6 g/dL      Hematocrit 38.5 %      .3 fL      MCH 27.9 pg      MCHC 27.5 g/dL      RDW 14.2 %      RDW-SD 53.1 fl      MPV 12.1 fL      Platelets 345 10*3/mm3      Neutrophil % 91.2 %      Lymphocyte % 2.3 %      Monocyte % 4.6 %      Eosinophil % 0.0 %      Basophil % 0.2 %      Immature Grans % 1.7 %      Neutrophils, Absolute 24.29 10*3/mm3      Lymphocytes, Absolute 0.62 10*3/mm3      Monocytes, Absolute 1.22 10*3/mm3      Eosinophils, Absolute 0.00 10*3/mm3      Basophils, Absolute 0.05 10*3/mm3      Immature Grans, Absolute 0.44 10*3/mm3      nRBC 0.1 /100 WBC     Blood Gas, Arterial [[id number]]  (Abnormal) Collected:  06/14/19 0101    Specimen:  Arterial Blood Updated:  06/14/19 0106     Site Arterial: right radial     Loc's Test Positive     pH, Arterial 7.202 pH units      Comment: Critical:Notify Dr LILLIANA VIRAMONTES MD (14-Jun-19 01:04:23)Read back ok        pCO2, Arterial 106.6 mm Hg      Comment: Critical:Notify Dr LILLIANA VIRAMONTES MD (14-Jun-19 01:04:23)Read back ok        pO2, Arterial 123.8 mm Hg      HCO3, Arterial 41.9 mmol/L      Base Excess, Arterial 9.6 mmol/L      O2 Saturation Calculated 97.4 %      Barometric Pressure for Blood Gas 758.3 mmHg      Modality NRB     Flow Rate 15 lpm      Rate 24 Breaths/minute     POC Glucose Once [[id number]]  (Abnormal) Collected:  06/14/19 0152    Specimen:  Blood Updated:  06/14/19 0154     Glucose 396 mg/dL           I ordered the above labs and reviewed the results    RADIOLOGY  XR Chest 1 View   Final Result   Basilar opacities felt to reflect airspace disease and   layering effusions           This report was finalized on 6/14/2019 1:13 AM by Servando Daily M.D.               I ordered  the above noted radiological studies. Interpreted by radiologist.  Reviewed by me in PACS.       PROCEDURES  Procedures  EKG          EKG time: 0040  Rhythm/Rate: sinus tachy 117  P waves and ND: nml  QRS, axis: RBBB   ST and T waves: diffuse T wave inversion     Interpreted Contemporaneously by me, independently viewed. Unchanged compared to prior on 2/12/19 except for rate.      PROGRESS AND CONSULTS     0040- 104/96. BS of 502. O2 sat in 70's on RA, put back on non rebreather w/ sat of 98.    0046- Ordered insulin.    0057- Rechecked pt, BP of 102/60, O2 sat of 98. Pt is resting comfortably, w/ POA nephew at bedside. Updated him to pt's status, and that pt may not make it through the night. Clarified w/ POA that pt consents to any measures to keep him comfortable. Discussed the plan to consult w/ hospice department here, and palliative care, as POA would like pt admitted. Pt understands and agrees with the plan, all questions answered.    0104- Reviewed pt's ABG, that indicates pt is in respiratory failure.     0142- Talked to Dr. Newell, pulmonology, who agrees w/ plan of care and agrees to admit pt to palliative care.    0156- Per RN, pt's BS is 396. Rechecked pt. Pt is resting comfortably, w/ nephew at bedside. Notified pt's family that he is in respiratory failure. Discussed the plan to admit pt to palliative care via Dr. Newell. Pt understands and agrees with the plan, all questions answered.      MEDICAL DECISION MAKING  Results were reviewed/discussed with the patient and they were also made aware of online access. Pt also made aware that some labs, such as cultures, will not be resulted during ER visit and follow up with PMD is necessary.     MDM  Number of Diagnoses or Management Options  Acute respiratory failure with hypercapnia (CMS/HCC):   Pneumonia of both lower lobes due to infectious organism (CMS/HCC):      Amount and/or Complexity of Data Reviewed  Clinical lab tests: ordered and reviewed  (WBC of 26.62)  Tests in the radiology section of CPT®: ordered and reviewed (See radiology findings)  Tests in the medicine section of CPT®: reviewed and ordered (See EKG procedure note)  Decide to obtain previous medical records or to obtain history from someone other than the patient: yes  Obtain history from someone other than the patient: yes (EMS, pt's nephew)  Review and summarize past medical records: yes (DNR hospice pt. Nephew is POA)  Discuss the patient with other providers: yes (Dr. Newell)  Independent visualization of images, tracings, or specimens: yes (XR Chest)           DIAGNOSIS  Final diagnoses:   Pneumonia of both lower lobes due to infectious organism (CMS/HCC)   Acute respiratory failure with hypercapnia (CMS/HCC)       DISPOSITION  ADMISSION    Discussed treatment plan and reason for admission with pt/family and admitting physician.  Pt/family voiced understanding of the plan for admission for further testing/treatment as needed.         Latest Documented Vital Signs:  As of 2:39 AM  BP- 101/66 HR- 108 Temp- 98.2 °F (36.8 °C) (Tympanic) O2 sat- 93%    --  Documentation assistance provided by caesy Euceda for Dr. Mejia.  Information recorded by the scribe was done at my direction and has been verified and validated by me.     Kristel Euceda  06/14/19 0239       Tra Mejia MD  06/14/19 0040

## 2019-06-14 NOTE — ED NOTES
O2 Sat decreased to 74% on O2 @2l/nc.  Pt placed on NRM at 15 L.     Beatrice Berg RN  06/14/19 0046

## 2019-06-14 NOTE — PROGRESS NOTES
Discharge Planning Assessment  [facility]     Patient Name: Rosalio Rene  MRN: [id number]  Today's Date: 2019    Admit Date: 2019    Discharge Needs Assessment    No documentation.       Discharge Plan     Row Name [id number]       Plan    Plan Comments  The patient  on 19 @ 09:18. RACHELLE Madrigal RN, [facility].     Row Name [id number]       Plan    Plan Comments  The patient was current with [facility] prior to admission. Notified [facility]. The patient is palliative. CCP will continue to follow for any needs that may arise.         Destination - Selection Complete      Service Provider Request Status Selected Services Address Phone Number Fax Number    [facility] Selected Inpatient Hospice [phone], [facility] [address] [phone]      Durable Medical Equipment      No service coordination in this encounter.      Dialysis/Infusion      No service coordination in this encounter.      Home Medical Care      No service coordination in this encounter.      Therapy      No service coordination in this encounter.      Community Resources      No service coordination in this encounter.          Demographic Summary    No documentation.       Functional Status    No documentation.       Psychosocial    No documentation.       Abuse/Neglect    No documentation.       Legal    No documentation.       Substance Abuse    No documentation.       Patient Forms    No documentation.           Racquel Madrigal RN

## 2019-06-14 NOTE — PLAN OF CARE
Problem: Patient Care Overview  Goal: Plan of Care Review  Outcome: Ongoing (interventions implemented as appropriate)   06/14/19 0609   Coping/Psychosocial   Plan of Care Reviewed With patient;family   Plan of Care Review   Progress no change   OTHER   Outcome Summary Pt admitted from ED with jennifer lower lobe PNA-unable to get complete hx- pt is minimally verbal and nephew left hospital exhausted from caring for him. Spoke with nephew briefly regarding goals of care, he wishes for his uncle to be comfortable and understands he will probably not leave here. Pt is dusky and cool to touch-currently on 5L of O2 with sats of 88%. Edematous lower ext with some pitting noted-3 stage II/III wounds on pt coccyx and gluteals-photos taken. Arrived with f/c-gtube clamped. Notified [facility] and they will see him today-he is a current patient. Pt has received no PRN meds but looks comfortable-will continue to monitor and offer comfort and support per palliative POC.       Problem: Fall Risk (Adult)  Goal: Identify Related Risk Factors and Signs and Symptoms  Outcome: Ongoing (interventions implemented as appropriate)    Goal: Absence of Fall  Outcome: Ongoing (interventions implemented as appropriate)      Problem: Skin Injury Risk (Adult)  Goal: Identify Related Risk Factors and Signs and Symptoms  Outcome: Ongoing (interventions implemented as appropriate)    Goal: Skin Health and Integrity  Outcome: Ongoing (interventions implemented as appropriate)      Problem: Pain, Chronic (Adult)  Goal: Identify Related Risk Factors and Signs and Symptoms  Outcome: Ongoing (interventions implemented as appropriate)    Goal: Acceptable Pain/Comfort Level and Functional Ability  Outcome: Ongoing (interventions implemented as appropriate)      Problem: Palliative Care (Adult)  Goal: Identify Related Risk Factors and Signs and Symptoms  Outcome: Ongoing (interventions implemented as appropriate)    Goal: Maximized Comfort  Outcome: Ongoing  (interventions implemented as appropriate)    Goal: Enhanced Quality of Life  Outcome: Ongoing (interventions implemented as appropriate)

## 2019-06-14 NOTE — PROGRESS NOTES
Discharge Planning Assessment  [facility]     Patient Name: Rosalio Rene  MRN: [id number]  Today's Date: 6/14/2019    Admit Date: 6/14/2019    Discharge Needs Assessment    No documentation.       Discharge Plan     Row Name 06/14/19 0901       Plan    Plan Comments  The patient was current with [facility] prior to admission. Notified [facility]. The patient is palliative. CCP will continue to follow for any needs that may arise.         Destination - Selection Complete      Service Provider Request Status Selected Services Address Phone Number Fax Number    [facility] [address] [facility] [address] [phone]      Durable Medical Equipment      No service coordination in this encounter.      Dialysis/Infusion      No service coordination in this encounter.      Home Medical Care      No service coordination in this encounter.      Therapy      No service coordination in this encounter.      Community Resources      No service coordination in this encounter.          Demographic Summary    No documentation.       Functional Status    No documentation.       Psychosocial    No documentation.       Abuse/Neglect    No documentation.       Legal    No documentation.       Substance Abuse    No documentation.       Patient Forms    No documentation.           Racquel Madrigal RN

## 2019-06-15 NOTE — DISCHARGE SUMMARY
Discharge As      Date of Admisssion:  2019  Date of Death:  2019  Time of Death:  9:18 AM    Patient Care Team:  Provider, No Known as PCP - Erinn Fernandez MD as PCP - Family Medicine  Rudy Cook MD as Consulting Physician (Pulmonary Disease)  Michael Bhagat MD as Referring Physician (Otolaryngology)  Ambrocio Pearce MD as Consulting Physician (Hematology and Oncology)  Edgar Newell MD as Consulting Physician (Hospice and Palliative Medicine)    Final Diagnosis:     Aspiration pneumonia of both lower lobes (healthcare associated)    Acute respiratory failure with hypoxia and hypercapnia (CMS/[facility])    Sepsis due to pneumonia (CMS/[facility])    Hospice care patient    Diabetes mellitus type 2, uncontrolled, with complications (CMS/[facility])    Laryngeal cancer (CMS/[facility])    COPD (chronic obstructive pulmonary disease) (CMS/[facility])    Vocal cord paralysis    Decubitus ulcer of coccyx, unspecified pressure ulcer stage; POA    Oropharyngeal dysphagia    Hyperkalemia    Chronic diastolic congestive heart failure (CMS/[facility])    Death in hospice          Hospital Course  Patient was a 79 y.o. male who was followed at home by MARLENA Meza, who has a history of throat cancer who presented with hyperglycemia. Hospice found BS in the 600's, and a systolic BP in the 70's. EMS was called and had O2 sats of 98% on non re breather mask. EMS reported patient with AMS, having difficulty breathing, lethargic and unresponsive. Patient became comfortable with treatment in the ED w/ DEYSI, connie, at bedside. ED physician updated the patient's status, and that patient may not make it through the night. ED physician clarified w/ POA that patient consents to any measures to keep him comfortable. Discussed the plan w/ hospice/palliative care; POA wished admission. I was called to admit the patient. Palliative care orders given and symptoms were managed. This AM, prior to my evaluation of the patient, I was  called that the patient passed away at 0918 hours.      Edgar Newell MD  Hospice and Palliative Medicine  06/14/19  9:28 PM

## 2019-06-17 NOTE — PROGRESS NOTES
Discharge Planning Assessment  [facility]     Patient Name: Rosalio Rene  MRN: [id number]  Today's Date: 2019    Admit Date: 2019    Discharge Needs Assessment    No documentation.       Discharge Plan     Row Name 19 1128       Plan    Plan Comments  The patient transferred to [facility] from ER on 19 @ 03:00. The patient was palliative. RACHELLE Madrigal RN, CCP.     Final Discharge Disposition Code  20 -     Final Note  The patient  on 19 @ 09:18. RACHELLE Madrigal Rn, CCP.         Destination - Selection Complete      Service Provider Request Status Selected Services Address Phone Number Fax Number    [facility] [address] [address] [phone]      Durable Medical Equipment      No service coordination in this encounter.      Dialysis/Infusion      No service coordination in this encounter.      Home Medical Care      No service coordination in this encounter.      Therapy      No service coordination in this encounter.      Community Resources      No service coordination in this encounter.        Expected Discharge Date and Time     Expected Discharge Date Expected Discharge Time    2019         Demographic Summary    No documentation.       Functional Status    No documentation.       Psychosocial    No documentation.       Abuse/Neglect    No documentation.       Legal    No documentation.       Substance Abuse    No documentation.       Patient Forms    No documentation.           Racquel Madrigal RN

## 2019-06-17 NOTE — PROGRESS NOTES
Case Management Discharge Note    Final Note: The patient  on 19 @ 09:18. RACHELLE Madrigal Rn, CCP.     Destination - Selection Complete      Service Provider Request Status Selected Services Address Phone Number Fax Number    [facility] [address] [id number] [phone]      Durable Medical Equipment      No service has been selected for the patient.      Dialysis/Infusion      No service has been selected for the patient.      Home Medical Care      No service has been selected for the patient.      Therapy      No service has been selected for the patient.      Community Resources      No service has been selected for the patient.             Final Discharge Disposition Code: 41 -  in medical facility

## 2019-06-17 NOTE — PROGRESS NOTES
Discharge Planning Assessment  [facility]     Patient Name: Rosalio Rene  MRN: [id number]  Today's Date: 2019    Admit Date: 2019    Discharge Needs Assessment    No documentation.       Discharge Plan     Row Name 19 1128       Plan    Plan Comments  The patient transferred to [facility] from ER on 19 @ 03:00. The patient was palliative. RACHELLE Madrigal RN, CCP.     Final Discharge Disposition Code  41 -  in medical facility    Final Note  The patient  on 19 @ 09:18. RACHELLE Madrigal Rn, CCP.         Destination - Selection Complete      Service Provider Request Status Selected Services Address Phone Number Fax Number    [facility] [address] [address] [phone]      Durable Medical Equipment      No service coordination in this encounter.      Dialysis/Infusion      No service coordination in this encounter.      Home Medical Care      No service coordination in this encounter.      Therapy      No service coordination in this encounter.      Community Resources      No service coordination in this encounter.        Expected Discharge Date and Time     Expected Discharge Date Expected Discharge Time    2019         Demographic Summary    No documentation.       Functional Status    No documentation.       Psychosocial    No documentation.       Abuse/Neglect    No documentation.       Legal    No documentation.       Substance Abuse    No documentation.       Patient Forms    No documentation.           Racquel Madrigal RN

## 2019-06-17 NOTE — PROGRESS NOTES
Case Management Discharge Note    Final Note: The patient  on 19 @ 09:18. RACHELLE Madrigal Rn, CCP.     Destination - Selection Complete      Service Provider Request Status Selected Services Address Phone Number Fax Number    [facility] [address] [phone]      Durable Medical Equipment      No service has been selected for the patient.      Dialysis/Infusion      No service has been selected for the patient.      Home Medical Care      No service has been selected for the patient.      Therapy      No service has been selected for the patient.      Community Resources      No service has been selected for the patient.             Final Discharge Disposition Code: 20 -

## (undated) DEVICE — BITEBLOCK OMNI BLOC

## (undated) DEVICE — CANN NASL CO2 TRULINK W/O2 A/

## (undated) DEVICE — BNDR ABD 4PANEL 12IN MED/LG

## (undated) DEVICE — BNDR ABD 4PANEL 12IN 46 TO 62IN

## (undated) DEVICE — TUBING, SUCTION, 1/4" X 10', STRAIGHT: Brand: MEDLINE

## (undated) DEVICE — ENCORE® LATEX ORTHO SIZE 7.5, STERILE LATEX POWDER-FREE SURGICAL GLOVE: Brand: ENCORE

## (undated) DEVICE — ANTI-FOG SOLUTION WITH FOAM PAD: Brand: DEVON

## (undated) DEVICE — CODMAN® SURGICAL PATTIES 1/2" X 3" (1.27CM X 7.62CM): Brand: CODMAN®

## (undated) DEVICE — Device: Brand: DEFENDO AIR/WATER/SUCTION AND BIOPSY VALVE

## (undated) DEVICE — VISUALIZATION SYSTEM: Brand: CLEARIFY

## (undated) DEVICE — WIPE INST MEROCEL

## (undated) DEVICE — PERCUTANEOUS ENDOSCOPIC GASTROSTOMY KIT: Brand: ENDOVIVE SAFETY PEG KIT

## (undated) DEVICE — RESUSCITATOR,MANUAL,ADLT,MASK,TUBE RES: Brand: MEDLINE INDUSTRIES, INC.

## (undated) DEVICE — DRSNG TELFA PAD NONADH STR 1S 3X4IN

## (undated) DEVICE — PK ENT MINOR 40